# Patient Record
Sex: MALE | Race: WHITE | NOT HISPANIC OR LATINO | Employment: OTHER | URBAN - METROPOLITAN AREA
[De-identification: names, ages, dates, MRNs, and addresses within clinical notes are randomized per-mention and may not be internally consistent; named-entity substitution may affect disease eponyms.]

---

## 2017-02-13 DIAGNOSIS — Z83.71 FAMILY HISTORY OF POLYPS IN THE COLON: ICD-10-CM

## 2017-02-13 DIAGNOSIS — K63.5 COLON POLYPS: Primary | ICD-10-CM

## 2017-02-13 RX ORDER — SODIUM CHLORIDE 0.9 % (FLUSH) 0.9 %
1-10 SYRINGE (ML) INJECTION AS NEEDED
Status: CANCELLED | OUTPATIENT
Start: 2017-02-13

## 2017-02-20 ENCOUNTER — TELEPHONE (OUTPATIENT)
Dept: GASTROENTEROLOGY | Facility: CLINIC | Age: 67
End: 2017-02-20

## 2017-02-20 ENCOUNTER — TRANSCRIBE ORDERS (OUTPATIENT)
Dept: GASTROENTEROLOGY | Facility: CLINIC | Age: 67
End: 2017-02-20

## 2017-02-20 DIAGNOSIS — K22.70 BARRETT'S ESOPHAGUS WITHOUT DYSPLASIA: Primary | ICD-10-CM

## 2017-02-20 DIAGNOSIS — Z83.71 FAMILY HISTORY OF POLYPS IN THE COLON: ICD-10-CM

## 2017-02-20 DIAGNOSIS — Z86.010 HX OF COLONIC POLYPS: ICD-10-CM

## 2017-02-20 RX ORDER — SODIUM CHLORIDE 0.9 % (FLUSH) 0.9 %
1-10 SYRINGE (ML) INJECTION AS NEEDED
Status: CANCELLED | OUTPATIENT
Start: 2017-02-20

## 2017-02-20 RX ORDER — SODIUM CHLORIDE, SODIUM LACTATE, POTASSIUM CHLORIDE, CALCIUM CHLORIDE 600; 310; 30; 20 MG/100ML; MG/100ML; MG/100ML; MG/100ML
30 INJECTION, SOLUTION INTRAVENOUS CONTINUOUS
Status: CANCELLED | OUTPATIENT
Start: 2017-02-20

## 2017-02-20 NOTE — TELEPHONE ENCOUNTER
----- Message from Dago KEMP MD sent at 2/10/2017  4:54 PM EST -----  Regarding: RE: OA Packet  Okay for open access colonoscopy (off aspirin for 5 days) due to suboptimal bowel prep 3 years ago and personal history as well as family history of polyps.  Will need to adjust prep for more effective clean out i.e. clear liquids for 1-2 days prior to preparation, large volume prep.  ----- Message -----     From: Jonathan Hernandez     Sent: 2/10/2017  11:56 AM       To: Dago KEMP MD  Subject: OA Packet                                        Scanned to media

## 2017-02-20 NOTE — TELEPHONE ENCOUNTER
Left voice message informing patient that he has been scheduled for both an EGD(due to his HX of Penaloza's Esophagus) and a Colonoscopy(due to his HX polyps & FH polyps).  Instructed pt that Dr Abbott recommends that he start a clear liquid diet a day prior to his prep and that he might consider taking a laxative daily for 3 days prior to starting his prep on 3/6/17(secondary to his poor prep with his last colonoscopy) to make sure he is completely cleaned out.

## 2017-03-07 ENCOUNTER — ANESTHESIA EVENT (OUTPATIENT)
Dept: GASTROENTEROLOGY | Facility: HOSPITAL | Age: 67
End: 2017-03-07

## 2017-03-07 ENCOUNTER — HOSPITAL ENCOUNTER (OUTPATIENT)
Facility: HOSPITAL | Age: 67
Setting detail: HOSPITAL OUTPATIENT SURGERY
Discharge: HOME | End: 2017-03-07
Attending: INTERNAL MEDICINE | Admitting: INTERNAL MEDICINE

## 2017-03-07 ENCOUNTER — ANESTHESIA (OUTPATIENT)
Dept: GASTROENTEROLOGY | Facility: HOSPITAL | Age: 67
End: 2017-03-07

## 2017-03-07 VITALS
TEMPERATURE: 98.3 F | WEIGHT: 253.25 LBS | HEART RATE: 62 BPM | SYSTOLIC BLOOD PRESSURE: 150 MMHG | DIASTOLIC BLOOD PRESSURE: 93 MMHG | BODY MASS INDEX: 35.45 KG/M2 | HEIGHT: 71 IN | OXYGEN SATURATION: 96 % | RESPIRATION RATE: 16 BRPM

## 2017-03-07 DIAGNOSIS — K22.70 BARRETT'S ESOPHAGUS WITHOUT DYSPLASIA: ICD-10-CM

## 2017-03-07 DIAGNOSIS — Z83.71 FAMILY HISTORY OF POLYPS IN THE COLON: ICD-10-CM

## 2017-03-07 DIAGNOSIS — K63.5 COLON POLYPS: ICD-10-CM

## 2017-03-07 PROCEDURE — 88305 TISSUE EXAM BY PATHOLOGIST: CPT | Performed by: INTERNAL MEDICINE

## 2017-03-07 PROCEDURE — 25010000002 PROPOFOL 10 MG/ML EMULSION: Performed by: ANESTHESIOLOGY

## 2017-03-07 PROCEDURE — 45380 COLONOSCOPY AND BIOPSY: CPT | Performed by: INTERNAL MEDICINE

## 2017-03-07 PROCEDURE — 88312 SPECIAL STAINS GROUP 1: CPT | Performed by: INTERNAL MEDICINE

## 2017-03-07 PROCEDURE — 87081 CULTURE SCREEN ONLY: CPT | Performed by: INTERNAL MEDICINE

## 2017-03-07 PROCEDURE — 43239 EGD BIOPSY SINGLE/MULTIPLE: CPT | Performed by: INTERNAL MEDICINE

## 2017-03-07 RX ORDER — SODIUM CHLORIDE 0.9 % (FLUSH) 0.9 %
1-10 SYRINGE (ML) INJECTION AS NEEDED
Status: DISCONTINUED | OUTPATIENT
Start: 2017-03-07 | End: 2017-03-07 | Stop reason: HOSPADM

## 2017-03-07 RX ORDER — ROSUVASTATIN CALCIUM 40 MG/1
40 TABLET, COATED ORAL DAILY
COMMUNITY

## 2017-03-07 RX ORDER — SODIUM CHLORIDE, SODIUM LACTATE, POTASSIUM CHLORIDE, CALCIUM CHLORIDE 600; 310; 30; 20 MG/100ML; MG/100ML; MG/100ML; MG/100ML
30 INJECTION, SOLUTION INTRAVENOUS CONTINUOUS
Status: DISCONTINUED | OUTPATIENT
Start: 2017-03-07 | End: 2017-03-07 | Stop reason: HOSPADM

## 2017-03-07 RX ORDER — VITAMIN E 268 MG
400 CAPSULE ORAL DAILY
COMMUNITY

## 2017-03-07 RX ORDER — PROPOFOL 10 MG/ML
VIAL (ML) INTRAVENOUS AS NEEDED
Status: DISCONTINUED | OUTPATIENT
Start: 2017-03-07 | End: 2017-03-07 | Stop reason: SURG

## 2017-03-07 RX ORDER — ASPIRIN 81 MG/1
81 TABLET, CHEWABLE ORAL DAILY
COMMUNITY

## 2017-03-07 RX ORDER — LANSOPRAZOLE 30 MG/1
30 CAPSULE, DELAYED RELEASE ORAL DAILY
COMMUNITY

## 2017-03-07 RX ORDER — MOEXIPRIL HCL 15 MG
15 TABLET ORAL NIGHTLY
COMMUNITY

## 2017-03-07 RX ORDER — MELATONIN
1000 DAILY
COMMUNITY

## 2017-03-07 RX ORDER — LIDOCAINE HYDROCHLORIDE 20 MG/ML
INJECTION, SOLUTION INFILTRATION; PERINEURAL AS NEEDED
Status: DISCONTINUED | OUTPATIENT
Start: 2017-03-07 | End: 2017-03-07 | Stop reason: SURG

## 2017-03-07 RX ADMIN — SODIUM CHLORIDE, POTASSIUM CHLORIDE, SODIUM LACTATE AND CALCIUM CHLORIDE 30 ML/HR: 600; 310; 30; 20 INJECTION, SOLUTION INTRAVENOUS at 12:35

## 2017-03-07 RX ADMIN — SODIUM CHLORIDE, POTASSIUM CHLORIDE, SODIUM LACTATE AND CALCIUM CHLORIDE: 600; 310; 30; 20 INJECTION, SOLUTION INTRAVENOUS at 13:00

## 2017-03-07 RX ADMIN — LIDOCAINE HYDROCHLORIDE 60 MG: 20 INJECTION, SOLUTION INFILTRATION; PERINEURAL at 13:00

## 2017-03-07 RX ADMIN — PROPOFOL 150 MG: 10 INJECTION, EMULSION INTRAVENOUS at 13:35

## 2017-03-07 RX ADMIN — PROPOFOL 300 MG: 10 INJECTION, EMULSION INTRAVENOUS at 13:00

## 2017-03-07 NOTE — ANESTHESIA POSTPROCEDURE EVALUATION
Patient: Evan Yoon    Procedure Summary     Date Anesthesia Start Anesthesia Stop Room / Location    03/07/17 1300 1347  [facility] /  [facility]       Procedure Diagnosis Surgeon Provider    COLONOSCOPY TO CECUM AND TERMINAL ILEUM WITH COLD BIOPSY POLYPECTOMIES (N/A ); ESOPHAGOGASTRODUODENOSCOPY WITH BIOPSIES (N/A Esophagus) Family history of polyps in the colon; Colon polyps  (Family history of polyps in the colon [Z83.71]; Colon polyps [K63.5]) MD Matt Haywood MD          Anesthesia Type: MAC  Last vitals  BP      Temp      Pulse     Resp      SpO2        Post Anesthesia Care and Evaluation    Patient location during evaluation: PACU  Patient participation: complete - patient participated  Level of consciousness: awake and alert  Pain management: adequate  Airway patency: patent  Anesthetic complications: No anesthetic complications    Cardiovascular status: acceptable  Respiratory status: acceptable  Hydration status: acceptable

## 2017-03-07 NOTE — DISCHARGE INSTRUCTIONS
WHAT ARE DIVERTICULOSIS AND DIVERTICULITIS?  Many people have small pouches in their colons that bulge outward through weak spots, like an inner tube that pokes through weak places in a tire.  Each pouch is called a diverticulum.  The condition of having diverticula is DIVERTICULOSIS.  The condition becomes more common as people age.  About half of all people over the age of 60 have diverticulosis    When pouches become infected or inflamed, the condition is called DIVERTICULITIS.  This happens in 10% to 25% of people with diverticulosis.  Diverticulosis and diverticulitis are also called DIVERTICULAR DISEASE.     WHAT ARE THE SYMPTOMS?  Diverticulosis - Most people do not have any discomfort or symptoms.  However, symptoms may include mild cramps, bloating, and constipation.  Other diseases such as irritable bowel syndrome (IBS) and stomach ulcers cause similar problems, so these symptoms do not always mean a person has diverticulosis.  You should visit your doctor if you have these troubling symptoms.    Diverticulitis - The most common symptom is abdominal pain.  The most common sign is tenderness around the left side of the lower abdomen.  If infection is the cause, fever, nausea, vomiting, chills, cramping, and constipation may occur as well.  The severity depends on the extent of the infection and complications.    WHAT ARE THE COMPLICATIONS?  Diverticulitis can lead to bleeding, infections,perforations or tears, or blockages.  These complications always require treatment to prevent them from proggressing and causing serous illness.    Bleeding from a diverticula is a rare complication.  When this occurs, blood may appear in the toilet or in your stool.  Bleeding can be severe, but it may stop by itself and not require treatment.  Doctors believe bleeding diverticula are caused by a small blood vessel in a diverticulum that weakens and finally bursts.  If you have bleeding from the rectum, you should see your  doctor.  If the bleeding does not stop you may need surgery.    Abscess, Perforation, and Peritonitis - The infection causing diverticulitis often clears up after a few days of treatment with antibiotics.  If the condition gets worse, an abscess may form in the colon.  An abscess is an infected area with pus that may cause swelling and destroy tissue.  Sometimes the infected diverticula may develop small holes, called perforations.  These perforations allow pus to leak out of the colon into the abdominal area.  If the abscess is small and remains in the colon, it may clear up after treatment with antibiotics.  If not, the doctor may need to drain it.  A large abscess can become a serious problem if the infection leaks out and contaminates areas outside the colon.  Infection that spreads into the abdominal cavity is called peritonitis.  Peritonitis requires immediate surgery toclean the abdominal cavity and remove the damaged part of the colon.  Without surgery, peritonitis can be fatal.    FISTULA  A fistula is an abnormal connection of tissue between two organs or between an organ and the skin.  When damaged tissues come into contact with each other during infection, they sometimes stick together.  If they heal that way, a fistula forms.  When diverticulitis-related infection spreads through out the colon, the colon's tissue may stick to nearby tissues.  The organs usually involved are the bladder, small intestine, and skin.  The problem can be corrected with surgery to remove the fistula and affected part of the colon.    INTESTINAL OBSTRUCTION  The scarring caused by infection may cause partial or total blockage of the large intestine.  When this happens, the colon is unable to move bowel contents normally.  When the obstruction totally blocks the intestine, emergency surgery is necessary.  Partial blockage is not an emergency, so the surgery to correct it can be planned.    WHAT CAUSES DIVERTICULAR  DISEASE  Although not proven, the dominant theory is that a low-fiber diet is the main cause of diverticular disease.  The disease was first noticed in the [place] in the early 1900s.  At about the same time, processed foods were introduced into the American diet.  Many processed foods contain refined, low-fiber flour.  Unlike whole-wheat flour, refined flour has no wheat bran.    Diverticular disease is common in developed or industrialized countries-particularly the [place], [place], and [place]-where low-fiber diets are common.  The disease is rare in countries of [place] and [place], where people eat high-fiber vegetable diets.    Fiber is the part of fruits, vegetables, and whole grains that the body cannot digest.  Some fiber dissolves easily in water (soluble fiber).  It takes on a soft, jelly-like texture in the intestines.  Some fiber passes almost unchanged through the intestines (insoluble fiber).  Both kinds of fiber help make stools soft and easy to pass.  Fiber also prevents constipation.    Constipation makes the muscles strain to move stool that is too hard.  It is the main cause of increased pressure in the colon.  This excess pressure might cause the weak spots in the colon to bulge out and become diverticula.  Diverticulitis occurs when diverticula become infected or inflamed.  Doctors are not certain what causes the infection.  It may begin when stool or bacteria are caught in the diverticula.  An attack of diverticulitis can develop suddenly and without warning.    HOW DOES THE DOCTOR DIAGNOSE DIVERTICULAR DISEASE  The doctor asks about medical history, does a physical exam, and may perform one or more diagnostic tests.  Because most people do not have symptoms, diverticulosis is often found through tests ordered for another ailment.    When taking a medical history, the doctor may ask about bowel habits, symptoms, pain, diet, and medications.  The physical exam usually involves a  digital rectal exam.  To preform this test. The doctor inserts a gloved, lubricated finger into the rectum to detect tenderness, blockage, or blood.  The doctor may check stool for signs of bleeding and test blood for signs of infection.  The doctor may also order x-rays or other tests.    WHAT IS THE TREATMENT FOR DIVERTICULAR DISEASE  Increasing the amount of fiber in the diet may reduce symptoms of diverticulosis and prevent complications such as diverticulitis.  Fiber keeps stool soft and lowers pressure inside the colon so that bowel contents can move through easily.  The American Dietetic Association. Recommends 20 to 35 grams of fiber each day.  The doctor may also recommend taking a fiber product such as Citrucel or Metamucil once a dya.  These products are mixed water and provide about 2 to 3.5 grams of fiber per  Tablespoon, mixed with 8 ounces of water.    Avoidance of nuts, popcorn, and sunflower, pumpkin, mitch, and sesame seeds has been recommended by physicians out of fear that food particles could enter, block, or irritate the diverticula.  However, no scientific data support this treatment measure.  Eating a high-fiber diet is the only requirement highly emphasized across the medical literature.  Eliminating specific foods is not necessary.  The seeds in tomatoes, zucchini, cucumbers, strawberries, and raspberries, as well as poppy seeds, are generally considered harmless.  People differ in amounts and types of foods the can eat.  Decisions about diet should be made based on what works best for each person.  Keeping a food diary may help identify what foods may cause symptoms.    If cramps, bloating, and constipation are problems, the doctor may prescribe  Short course of pain medication.  However, many medications affect emptying of the colon, an undesirable side effect for people with diverticulosis.    DIVERTICULITIS  Treatment focuses on clearing up the infection and inflammation, resting the  colon, and preventing or minimizing complications.  An attack of diverticulitis without complications may respond to antibiotics within a few days if treated early.  To help the colon rest, the doctor may recommend bed rest and a liquid diet, along with a pain reliever.    An acute attack with severe pain or sever infection may require a hospital stay.  Most acute cases of diverticulitis are treated with antibiotics and a liquid diet.  The antibiotics are given by injection into a vein.  In some cases, however, surgery may be necessary.    WHEN IS SURGERY NECESSARY  If attacks are severe or frequent, the doctor may advise surgery.  The surgeon removes the affected part of the colon and joins the remaining sections.  This typed of surgery, called colon resection, aims to keep attacks from coming back and to prevent complications.  The doctor may also recommend surgery for complications of a fistula or intestinal obstruction.    If antibiotics do not correct an attack, emergency surgery may be required.  Other reasons for emergency surgery include a large abscess, perforation, peritonitis, or continued bleeding.    Emergency surgery usually involves 2 operations.  The first will clear the infected abdominal cavity and remove part of the colon.  Because infection and sometimes obstruction, it is not safe to rejoin the colon during the first operation.  Instead, the surgeon creates a temporary hole, or stoma, in the abdomen.  The end of the colon is connected to the hole, a procedure called a colostomy, to allow normal eating and bowel movements.  The stool goes into a bag attached to the opening in the abdomen.  In the second operation, the surgeon rejoins the ends of the colon.

## 2017-03-07 NOTE — H&P
[facility]  Pre Procedure History & Physical    Chief Complaint:   GERD, Penaloza's esophagus, personal history of polyps, family history of polyps    Subjective     HPI:   This 67-year-old white male presents to the endoscopy suite for upper endoscopy and colonoscopy.  He has a history of Penaloza's esophagus last studied in February 2015.  He also Has a personal history of polyps and a family history of polyps.  Last colonoscopy of record was in 2014 with a suboptimal prep.    Past Medical History:   Past Medical History   Diagnosis Date   • Penaloza esophagus    • GERD (gastroesophageal reflux disease)    • Hypertension        Family History:  History reviewed. No pertinent family history.    Social History:   reports that he has never smoked. He does not have any smokeless tobacco history on file.    Medications:   Prescriptions Prior to Admission   Medication Sig Dispense Refill Last Dose   • aspirin 81 MG chewable tablet Chew 81 mg Daily.   3/5/2017   • B Complex Vitamins (VITAMIN B COMPLEX PO) Take  by mouth.   3/4/2017   • cholecalciferol (VITAMIN D3) 1000 UNITS tablet Take 1,000 Units by mouth Daily.   3/4/2017   • lansoprazole (PREVACID) 30 MG capsule Take 30 mg by mouth Daily.   3/5/2017   • moexipril (UNIVASC) 15 MG tablet Take 15 mg by mouth Every Night.   3/7/2017   • Multiple Vitamin (MULTI VITAMIN DAILY PO) Take  by mouth.   3/4/2017   • Omega-3 Fatty Acids (FISH OIL) 1200 MG capsule delayed-release Take  by mouth.   3/4/2017   • rosuvastatin (CRESTOR) 40 MG tablet Take 40 mg by mouth Daily.   3/5/2017   • vitamin E 400 UNIT capsule Take 400 Units by mouth Daily.   3/4/2017       Allergies:  Review of patient's allergies indicates no known allergies.    ROS:    Pertinent items are noted in HPI, all other systems reviewed and negative     Objective     Blood pressure 142/79, pulse 67, temperature 98.3 °F (36.8 °C), temperature source Oral, resp. rate 18, height 71\" (180.3 cm), " weight 253 lb 4 oz (115 kg), SpO2 94 %.    Physical Exam   Constitutional: Pt is oriented to person, place, and time and well-developed, well-nourished, and in no distress.   HENT:   Mouth/Throat: Oropharynx is clear and moist.   Neck: Normal range of motion. Neck supple.   Cardiovascular: Normal rate, regular rhythm and normal heart sounds.    Pulmonary/Chest: Effort normal and breath sounds normal. No respiratory distress. No  wheezes.   Abdominal: Soft. Bowel sounds are normal.   Skin: Skin is warm and dry.   Psychiatric: Mood, memory, affect and judgment normal.     Assessment/Plan     Diagnosis:  GERD  Penaloza's esophagus  Family history of polyps  Personal history of polyps    Anticipated Surgical Procedure:  EGD, colonoscopy    The risks, benefits, and alternatives of this procedure have been discussed with the patient or the responsible party- the patient understands and agrees to proceed.

## 2017-03-07 NOTE — PLAN OF CARE
Problem: GI Endoscopy (Adult)  Intervention: Monitor/Manage Procedure Recovery    03/07/17 1206   Respiratory Interventions   Airway/Ventilation Management airway patency maintained   Activity   Activity Type activity adjusted per tolerance   Cardiac Interventions   Warming Thermoregulation Maintenance warm blankets applied   Coping/Psychosocial Interventions   Environmental Support calm environment promoted         Goal: Signs and Symptoms of Listed Potential Problems Will be Absent or Manageable (GI Endoscopy)  Outcome: Ongoing (interventions implemented as appropriate)    03/07/17 1206   GI Endoscopy   Problems Assessed (GI Endoscopy) pain   Problems Present (GI Endoscopy) none

## 2017-03-07 NOTE — ANESTHESIA PREPROCEDURE EVALUATION
Anesthesia Evaluation        Airway   Mallampati: II  Dental      Pulmonary    Cardiovascular         Neuro/Psych  GI/Hepatic/Renal/Endo      Musculoskeletal     Abdominal    Substance History      OB/GYN          Other                                    Anesthesia Plan    ASA 3     MAC     intravenous induction   Anesthetic plan and risks discussed with patient.

## 2017-03-08 LAB
CYTO UR: NORMAL
LAB AP CASE REPORT: NORMAL
Lab: NORMAL
PATH REPORT.FINAL DX SPEC: NORMAL
PATH REPORT.GROSS SPEC: NORMAL
UREASE TISS QL: NEGATIVE

## 2017-03-10 ENCOUNTER — TELEPHONE (OUTPATIENT)
Dept: GASTROENTEROLOGY | Facility: CLINIC | Age: 67
End: 2017-03-10

## 2017-03-10 NOTE — TELEPHONE ENCOUNTER
Pt called and advised per Dr Abbott that the dudenum bx was normal. The stomach bx showed min chronic inflammation.  The esophageal bx were consistent with knight's esophagus.  The colon polyps that were removed were precancerous and he recommends to continue ppi , repeat egd in 2 yrs, repeat c/s in 3 yrs, f/u yearly and call sooner if needed.  Pt verb understanding.      Message sent to Dary to place f/u in recall for 03/08/2018, egd in recall for 03/08/2019, and c/s in recall for 03/08/2020.

## 2017-03-10 NOTE — TELEPHONE ENCOUNTER
----- Message from Dago KEMP MD sent at 3/9/2017  5:36 PM EST -----  Regarding: Biopsy results  Okay to call results, recommend continued PPI, follow up EGD in 2 years, follow-up colonoscopy in 3 years, office follow-up annually or sooner as needed  ----- Message -----     From: Lab, Background User     Sent: 3/8/2017   1:26 PM       To: Dago KEMP MD

## 2019-03-19 ENCOUNTER — PREP FOR SURGERY (OUTPATIENT)
Dept: OTHER | Facility: HOSPITAL | Age: 69
End: 2019-03-19

## 2019-03-19 DIAGNOSIS — K22.70 BARRETT'S ESOPHAGUS WITHOUT DYSPLASIA: Primary | ICD-10-CM

## 2019-03-20 ENCOUNTER — PREP FOR SURGERY (OUTPATIENT)
Dept: OTHER | Facility: HOSPITAL | Age: 69
End: 2019-03-20

## 2019-03-20 DIAGNOSIS — K22.70 BARRETT'S ESOPHAGUS WITHOUT DYSPLASIA: Primary | ICD-10-CM

## 2019-04-04 PROBLEM — K22.70 BARRETT'S ESOPHAGUS WITHOUT DYSPLASIA: Status: ACTIVE | Noted: 2019-04-04

## 2019-04-23 ENCOUNTER — ANESTHESIA (OUTPATIENT)
Dept: GASTROENTEROLOGY | Facility: HOSPITAL | Age: 69
End: 2019-04-23

## 2019-04-23 ENCOUNTER — HOSPITAL ENCOUNTER (OUTPATIENT)
Facility: HOSPITAL | Age: 69
Setting detail: HOSPITAL OUTPATIENT SURGERY
Discharge: HOME | End: 2019-04-23
Attending: INTERNAL MEDICINE | Admitting: INTERNAL MEDICINE

## 2019-04-23 ENCOUNTER — ANESTHESIA EVENT (OUTPATIENT)
Dept: GASTROENTEROLOGY | Facility: HOSPITAL | Age: 69
End: 2019-04-23

## 2019-04-23 VITALS
HEIGHT: 71 IN | SYSTOLIC BLOOD PRESSURE: 135 MMHG | OXYGEN SATURATION: 95 % | BODY MASS INDEX: 35.99 KG/M2 | HEART RATE: 62 BPM | DIASTOLIC BLOOD PRESSURE: 89 MMHG | TEMPERATURE: 98.1 F | RESPIRATION RATE: 18 BRPM | WEIGHT: 257.06 LBS

## 2019-04-23 DIAGNOSIS — K22.70 BARRETT'S ESOPHAGUS WITHOUT DYSPLASIA: ICD-10-CM

## 2019-04-23 PROCEDURE — 43239 EGD BIOPSY SINGLE/MULTIPLE: CPT | Performed by: INTERNAL MEDICINE

## 2019-04-23 PROCEDURE — 25010000002 PROPOFOL 10 MG/ML EMULSION: Performed by: ANESTHESIOLOGY

## 2019-04-23 PROCEDURE — 25010000002 PROPOFOL 1000 MG/ML EMULSION: Performed by: ANESTHESIOLOGY

## 2019-04-23 PROCEDURE — 87081 CULTURE SCREEN ONLY: CPT | Performed by: INTERNAL MEDICINE

## 2019-04-23 PROCEDURE — S0260 H&P FOR SURGERY: HCPCS | Performed by: INTERNAL MEDICINE

## 2019-04-23 PROCEDURE — 88305 TISSUE EXAM BY PATHOLOGIST: CPT | Performed by: INTERNAL MEDICINE

## 2019-04-23 RX ORDER — LIDOCAINE HYDROCHLORIDE 20 MG/ML
INJECTION, SOLUTION INFILTRATION; PERINEURAL AS NEEDED
Status: DISCONTINUED | OUTPATIENT
Start: 2019-04-23 | End: 2019-04-23 | Stop reason: SURG

## 2019-04-23 RX ORDER — SODIUM CHLORIDE, SODIUM LACTATE, POTASSIUM CHLORIDE, CALCIUM CHLORIDE 600; 310; 30; 20 MG/100ML; MG/100ML; MG/100ML; MG/100ML
30 INJECTION, SOLUTION INTRAVENOUS CONTINUOUS PRN
Status: DISCONTINUED | OUTPATIENT
Start: 2019-04-23 | End: 2019-04-23 | Stop reason: HOSPADM

## 2019-04-23 RX ORDER — PROPOFOL 10 MG/ML
VIAL (ML) INTRAVENOUS AS NEEDED
Status: DISCONTINUED | OUTPATIENT
Start: 2019-04-23 | End: 2019-04-23 | Stop reason: SURG

## 2019-04-23 RX ADMIN — SODIUM CHLORIDE, POTASSIUM CHLORIDE, SODIUM LACTATE AND CALCIUM CHLORIDE 30 ML/HR: 600; 310; 30; 20 INJECTION, SOLUTION INTRAVENOUS at 14:20

## 2019-04-23 RX ADMIN — PROPOFOL 200 MCG/KG/MIN: 10 INJECTION, EMULSION INTRAVENOUS at 15:05

## 2019-04-23 RX ADMIN — LIDOCAINE HYDROCHLORIDE 60 MG: 20 INJECTION, SOLUTION INFILTRATION; PERINEURAL at 15:05

## 2019-04-23 RX ADMIN — PROPOFOL 150 MG: 10 INJECTION, EMULSION INTRAVENOUS at 15:05

## 2019-04-23 NOTE — ANESTHESIA POSTPROCEDURE EVALUATION
"Patient: Evan Yoon    Procedure Summary     Date:  04/23/19 Room / Location:   [facility]    Anesthesia Start:  1457 Anesthesia Stop:  1516    Procedure:  ESOPHAGOGASTRODUODENOSCOPY with biopsies (N/A Esophagus) Diagnosis:       Penaloza's esophagus      Hiatal hernia      Gastritis      (Penaloza's esophagus without dysplasia [K22.70])    Surgeon:  Dago Abbott MD Provider:  Mar Capm MD    Anesthesia Type:  MAC ASA Status:  2          Anesthesia Type: MAC  Last vitals  BP   126/84 (04/23/19 1526)   Temp   36.7 °C (98.1 °F) (04/23/19 1354)   Pulse   67 (04/23/19 1526)   Resp   18 (04/23/19 1526)     SpO2   94 % (04/23/19 1526)     Post Anesthesia Care and Evaluation    Patient location during evaluation: bedside  Patient participation: complete - patient participated  Level of consciousness: awake and alert  Pain management: adequate  Airway patency: patent  Anesthetic complications: No anesthetic complications  PONV Status: none  Cardiovascular status: acceptable  Respiratory status: acceptable  Hydration status: acceptable    Comments: /84 (BP Location: Left arm, Patient Position: Lying)   Pulse 67   Temp 36.7 °C (98.1 °F) (Oral)   Resp 18   Ht 180.3 cm (71\")   Wt 117 kg (257 lb 1 oz)   SpO2 94%   BMI 35.85 kg/m²         "

## 2019-04-23 NOTE — DISCHARGE INSTRUCTIONS
For the next 24 hours patient needs to be with a responsible adult.    For 24 hours DO NOT drive, operate machinery, appliances, drink alcohol, make important decisions or sign legal documents.    Start with a light or bland diet if you are feeling sick to your stomach otherwise advance to regular diet as tolerated.    Follow recommendations on procedure report if provided by your doctor.    Call  for problems [phone]    Problems may include but not limited to: large amounts of bleeding, trouble breathing, repeated vomiting, severe unrelieved pain, fever or chills.

## 2019-04-23 NOTE — ANESTHESIA PREPROCEDURE EVALUATION
Anesthesia Evaluation     Patient summary reviewed   NPO Solid Status: > 8 hours  NPO Liquid Status: > 8 hours           Airway   Mallampati: I  TM distance: >3 FB  Dental      Pulmonary    (+) sleep apnea on CPAP,   Cardiovascular     Rhythm: regular  Rate: normal    (+) hypertension, hyperlipidemia,       Neuro/Psych  GI/Hepatic/Renal/Endo    (+) obesity,  GERD,      Musculoskeletal     Abdominal    Substance History      OB/GYN          Other                        Anesthesia Plan    ASA 2     MAC   total IV anesthesia  Anesthetic plan, all risks, benefits, and alternatives have been provided, discussed and informed consent has been obtained with: patient.

## 2019-04-23 NOTE — H&P
[facility]  Pre Procedure History & Physical    Chief Complaint:   Penaloza's esophagus    Subjective     HPI:   This 69-year-old male presents to the endoscopy suite for upper endoscopic evaluation.  He has a history of Penaloza's esophagus.  Last upper endoscopy performed in March 2017.    Past Medical History:   Past Medical History:   Diagnosis Date   • Penaloza esophagus    • GERD (gastroesophageal reflux disease)    • Hypertension        Past Surgical History:  Past Surgical History:   Procedure Laterality Date   • BACK SURGERY     • CAROTID ENDARTERECTOMY Right    • COLONOSCOPY N/A 3/7/2017    Procedure: COLONOSCOPY TO CECUM AND TERMINAL ILEUM WITH COLD BIOPSY POLYPECTOMIES;  Surgeon: Dago KEMP MD;  Location: [facility] ENDOSCOPY;  Service:    • ENDOSCOPY N/A 3/7/2017    Procedure: ESOPHAGOGASTRODUODENOSCOPY WITH BIOPSIES;  Surgeon: Dago KEMP MD;  Location: [facility] ENDOSCOPY;  Service:        Family History:  No family history on file.    Social History:   reports that he has never smoked. He does not have any smokeless tobacco history on file.    Medications:   No medications prior to admission.       Allergies:  Patient has no known allergies.    ROS:    Pertinent items are noted in HPI, all other systems reviewed and negative     Objective     There were no vitals taken for this visit.    Physical Exam   Constitutional: Pt is oriented to person, place, and time and well-developed, well-nourished, and in no distress.   Mouth/Throat: Oropharynx is clear and moist.   Neck: Normal range of motion.   Cardiovascular: Normal rate, regular rhythm and normal heart sounds.    Pulmonary/Chest: Effort normal and breath sounds normal.   Abdominal: Soft. Nontender  Skin: Skin is warm and dry.   Psychiatric: Mood, memory, affect and judgment normal.     Assessment/Plan     Diagnosis:  Penaloza's esophagus    Anticipated Surgical Procedure:  EGD    The risks, benefits, and  alternatives of this procedure have been discussed with the patient or the responsible party- the patient understands and agrees to proceed.

## 2019-04-24 LAB — UREASE TISS QL: NEGATIVE

## 2019-04-25 LAB
CYTO UR: NORMAL
LAB AP CASE REPORT: NORMAL
PATH REPORT.FINAL DX SPEC: NORMAL
PATH REPORT.GROSS SPEC: NORMAL

## 2019-05-01 ENCOUNTER — TELEPHONE (OUTPATIENT)
Dept: GASTROENTEROLOGY | Facility: CLINIC | Age: 69
End: 2019-05-01

## 2019-05-01 NOTE — TELEPHONE ENCOUNTER
Called pt and advised per Dr Abbott that the duodenal bx was benign.  The stomach bx showed mild chronic inflammation was neg for h pylori.   The ge junction bx showed mild chronic inflammation and knight's esophagus without dysplasia.      He recommends to continue ppi, repeat egd in 2 yrs , f/u yearly and sooner if needed.     Pt verb understanding.     Egd placed in recall for 04/23/2021 and f/u placed in recall for 04/23/2020.

## 2019-05-01 NOTE — TELEPHONE ENCOUNTER
----- Message from Dago KEMP MD sent at 4/30/2019  4:40 PM EDT -----  Regarding: Biopsy results  Okay to call results, continue PPI.  Follow-up EGD in 2 years.  Office follow-up annually or sooner as needed.  ----- Message -----  From: Lab, Background User  Sent: 4/24/2019   8:00 PM  To: Dago KEMP MD

## 2019-09-04 ENCOUNTER — OUTSIDE FACILITY SERVICE (OUTPATIENT)
Dept: SLEEP MEDICINE | Facility: HOSPITAL | Age: 69
End: 2019-09-04

## 2019-09-04 ENCOUNTER — HOSPITAL ENCOUNTER (OUTPATIENT)
Dept: SLEEP MEDICINE | Facility: HOSPITAL | Age: 69
Discharge: HOME | End: 2019-09-04
Attending: INTERNAL MEDICINE

## 2019-09-04 PROCEDURE — 99203 OFFICE O/P NEW LOW 30 MIN: CPT | Performed by: INTERNAL MEDICINE

## 2019-12-02 ENCOUNTER — HOSPITAL ENCOUNTER (OUTPATIENT)
Dept: CARDIOLOGY | Facility: HOSPITAL | Age: 69
Discharge: HOME | End: 2019-12-02
Attending: FAMILY MEDICINE

## 2021-06-03 ENCOUNTER — TELEPHONE (OUTPATIENT)
Dept: GASTROENTEROLOGY | Facility: CLINIC | Age: 71
End: 2021-06-03

## 2021-06-03 ENCOUNTER — OFFICE VISIT (OUTPATIENT)
Dept: GASTROENTEROLOGY | Facility: CLINIC | Age: 71
End: 2021-06-03

## 2021-06-03 VITALS — BODY MASS INDEX: 34.61 KG/M2 | HEIGHT: 71 IN | TEMPERATURE: 98.4 F | WEIGHT: 247.2 LBS

## 2021-06-03 DIAGNOSIS — D12.6 ADENOMATOUS POLYP OF COLON, UNSPECIFIED PART OF COLON: ICD-10-CM

## 2021-06-03 DIAGNOSIS — K21.9 GASTROESOPHAGEAL REFLUX DISEASE, UNSPECIFIED WHETHER ESOPHAGITIS PRESENT: Primary | ICD-10-CM

## 2021-06-03 DIAGNOSIS — K22.70 BARRETT'S ESOPHAGUS WITHOUT DYSPLASIA: ICD-10-CM

## 2021-06-03 PROCEDURE — 99213 OFFICE O/P EST LOW 20 MIN: CPT | Performed by: INTERNAL MEDICINE

## 2021-06-03 RX ORDER — TADALAFIL 5 MG/1
5 TABLET ORAL DAILY
COMMUNITY
Start: 2021-05-24

## 2021-06-03 RX ORDER — ALBUTEROL SULFATE 90 UG/1
2 AEROSOL, METERED RESPIRATORY (INHALATION) EVERY 4 HOURS PRN
COMMUNITY
Start: 2021-05-24

## 2021-06-03 RX ORDER — HYDROCHLOROTHIAZIDE 25 MG/1
25 TABLET ORAL DAILY
COMMUNITY
Start: 2021-05-24

## 2021-06-03 RX ORDER — AMLODIPINE BESYLATE 10 MG/1
10 TABLET ORAL DAILY
COMMUNITY
Start: 2021-05-24

## 2021-06-03 NOTE — PROGRESS NOTES
Chief Complaint   Patient presents with   • Follow-up   • Penaloza's esophagus        Evan Yoon is a  71 y.o. male here for a follow up visit for GERD, Penaloza's esophagus, history of polyps    HPI this 71-year-old white male patient of Dr. John Fischer presents since last seen in 2019.  His reflux is well controlled with Prevacid.  He has a history of Penaloza's esophagus which was last defined in 2019.  He also has a history of colon polyps and was found to have adenomatous polyps in 2017.  He would be due for upper and lower endoscopic evaluation at this time and he is amenable to this.    Past Medical History:   Diagnosis Date   • Penaloza esophagus    • GERD (gastroesophageal reflux disease)    • Hypertension        Current Outpatient Medications   Medication Sig Dispense Refill   • aspirin 81 MG chewable tablet Chew 81 mg Daily.     • B Complex Vitamins (VITAMIN B COMPLEX PO) Take  by mouth.     • cholecalciferol (VITAMIN D3) 1000 UNITS tablet Take 1,000 Units by mouth Daily.     • hydroCHLOROthiazide (HYDRODIURIL) 25 MG tablet Take 25 mg by mouth Daily.     • lansoprazole (PREVACID) 30 MG capsule Take 30 mg by mouth Daily.     • moexipril (UNIVASC) 15 MG tablet Take 15 mg by mouth Every Night.     • Multiple Vitamin (MULTI VITAMIN DAILY PO) Take  by mouth.     • Omega-3 Fatty Acids (FISH OIL) 1200 MG capsule delayed-release Take  by mouth.     • rosuvastatin (CRESTOR) 40 MG tablet Take 40 mg by mouth Daily.     • tadalafil (CIALIS) 5 MG tablet Take 5 mg by mouth Daily.     • Ventolin  (90 Base) MCG/ACT inhaler Inhale 2 puffs Every 4 (Four) Hours As Needed.     • vitamin E 400 UNIT capsule Take 400 Units by mouth Daily.     • amLODIPine (NORVASC) 10 MG tablet Take 10 mg by mouth Daily.       No current facility-administered medications for this visit.       PRN Meds:.    No Known Allergies    Social History     Socioeconomic History   • Marital status:      Spouse name: Not on file   • Number of  children: Not on file   • Years of education: Not on file   • Highest education level: Not on file   Tobacco Use   • Smoking status: Former Smoker     Quit date:      Years since quittin.4   • Smokeless tobacco: Never Used   Substance and Sexual Activity   • Alcohol use: Yes     Comment: social    • Drug use: Never       Family History   Problem Relation Age of Onset   • Colon cancer Maternal Aunt        Review of Systems   Constitutional: Negative for activity change, appetite change, fatigue and unexpected weight change.   HENT: Negative for congestion, facial swelling, sore throat, trouble swallowing and voice change.    Eyes: Negative for photophobia and visual disturbance.   Respiratory: Negative for cough and choking.    Cardiovascular: Negative for chest pain.   Gastrointestinal: Negative for abdominal distention, abdominal pain, anal bleeding, blood in stool, constipation, diarrhea, nausea, rectal pain and vomiting.        GERD   Endocrine: Negative for polyphagia.   Musculoskeletal: Negative for arthralgias, gait problem and joint swelling.   Skin: Negative for color change, pallor and rash.   Allergic/Immunologic: Negative for food allergies.   Neurological: Negative for speech difficulty and headaches.   Hematological: Does not bruise/bleed easily.   Psychiatric/Behavioral: Negative for agitation, confusion and sleep disturbance.       Vitals:    21 1320   Temp: 98.4 °F (36.9 °C)       Physical Exam  Constitutional:       Appearance: He is well-developed.   HENT:      Head: Normocephalic.   Eyes:      Conjunctiva/sclera: Conjunctivae normal.   Cardiovascular:      Rate and Rhythm: Normal rate and regular rhythm.   Pulmonary:      Breath sounds: Normal breath sounds.   Abdominal:      General: Bowel sounds are normal.      Palpations: Abdomen is soft.   Musculoskeletal:         General: Normal range of motion.      Cervical back: Normal range of motion.   Skin:     General: Skin is warm and  dry.   Neurological:      Mental Status: He is alert and oriented to person, place, and time.   Psychiatric:         Behavior: Behavior normal.         ASSESSMENT   #1 GERD: On Prevacid  #2 Penaloza's esophagus  #3 history of polyps      PLAN  Schedule EGD and colonoscopy  Continue PPI      ICD-10-CM ICD-9-CM   1. Gastroesophageal reflux disease, unspecified whether esophagitis present  K21.9 530.81   2. Penaloza's esophagus without dysplasia  K22.70 530.85   3. Adenomatous polyp of colon, unspecified part of colon  D12.6 211.3

## 2021-06-03 NOTE — TELEPHONE ENCOUNTER
spoke with pt schedule at [facility] on july 22 arrive at 0940 am jeffrey garcia---elan

## 2021-07-14 ENCOUNTER — TRANSCRIBE ORDERS (OUTPATIENT)
Dept: LAB | Facility: HOSPITAL | Age: 71
End: 2021-07-14

## 2021-07-14 DIAGNOSIS — Z01.818 PREOP TESTING: Primary | ICD-10-CM

## 2021-07-19 ENCOUNTER — LAB (OUTPATIENT)
Dept: LAB | Facility: HOSPITAL | Age: 71
End: 2021-07-19

## 2021-07-19 DIAGNOSIS — Z01.818 PREOP TESTING: ICD-10-CM

## 2021-07-19 LAB — SARS-COV-2 RNA RESP QL NAA+PROBE: NOT DETECTED

## 2021-07-19 PROCEDURE — U0003 INFECTIOUS AGENT DETECTION BY NUCLEIC ACID (DNA OR RNA); SEVERE ACUTE RESPIRATORY SYNDROME CORONAVIRUS 2 (SARS-COV-2) (CORONAVIRUS DISEASE [COVID-19]), AMPLIFIED PROBE TECHNIQUE, MAKING USE OF HIGH THROUGHPUT TECHNOLOGIES AS DESCRIBED BY CMS-2020-01-R: HCPCS

## 2021-07-19 PROCEDURE — C9803 HOPD COVID-19 SPEC COLLECT: HCPCS

## 2021-07-19 PROCEDURE — U0005 INFEC AGEN DETEC AMPLI PROBE: HCPCS

## 2021-07-22 ENCOUNTER — HOSPITAL ENCOUNTER (OUTPATIENT)
Facility: HOSPITAL | Age: 71
Setting detail: HOSPITAL OUTPATIENT SURGERY
Discharge: HOME | End: 2021-07-22
Attending: INTERNAL MEDICINE | Admitting: INTERNAL MEDICINE

## 2021-07-22 ENCOUNTER — ANESTHESIA EVENT (OUTPATIENT)
Dept: GASTROENTEROLOGY | Facility: HOSPITAL | Age: 71
End: 2021-07-22

## 2021-07-22 ENCOUNTER — ANESTHESIA (OUTPATIENT)
Dept: GASTROENTEROLOGY | Facility: HOSPITAL | Age: 71
End: 2021-07-22

## 2021-07-22 VITALS
OXYGEN SATURATION: 94 % | RESPIRATION RATE: 16 BRPM | BODY MASS INDEX: 33.88 KG/M2 | DIASTOLIC BLOOD PRESSURE: 71 MMHG | HEART RATE: 80 BPM | SYSTOLIC BLOOD PRESSURE: 133 MMHG | WEIGHT: 242 LBS | TEMPERATURE: 98.4 F | HEIGHT: 71 IN

## 2021-07-22 DIAGNOSIS — K22.70 BARRETT'S ESOPHAGUS WITHOUT DYSPLASIA: ICD-10-CM

## 2021-07-22 DIAGNOSIS — D12.6 ADENOMATOUS POLYP OF COLON, UNSPECIFIED PART OF COLON: ICD-10-CM

## 2021-07-22 DIAGNOSIS — K21.9 GASTROESOPHAGEAL REFLUX DISEASE, UNSPECIFIED WHETHER ESOPHAGITIS PRESENT: ICD-10-CM

## 2021-07-22 PROCEDURE — 25010000002 PROPOFOL 10 MG/ML EMULSION: Performed by: NURSE ANESTHETIST, CERTIFIED REGISTERED

## 2021-07-22 PROCEDURE — 43239 EGD BIOPSY SINGLE/MULTIPLE: CPT | Performed by: INTERNAL MEDICINE

## 2021-07-22 PROCEDURE — 88305 TISSUE EXAM BY PATHOLOGIST: CPT | Performed by: INTERNAL MEDICINE

## 2021-07-22 PROCEDURE — 87081 CULTURE SCREEN ONLY: CPT | Performed by: INTERNAL MEDICINE

## 2021-07-22 PROCEDURE — G0105 COLORECTAL SCRN; HI RISK IND: HCPCS | Performed by: INTERNAL MEDICINE

## 2021-07-22 PROCEDURE — S0260 H&P FOR SURGERY: HCPCS | Performed by: INTERNAL MEDICINE

## 2021-07-22 RX ORDER — SODIUM CHLORIDE 0.9 % (FLUSH) 0.9 %
3 SYRINGE (ML) INJECTION EVERY 12 HOURS SCHEDULED
Status: DISCONTINUED | OUTPATIENT
Start: 2021-07-22 | End: 2021-07-22 | Stop reason: HOSPADM

## 2021-07-22 RX ORDER — GLYCOPYRROLATE 0.2 MG/ML
INJECTION INTRAMUSCULAR; INTRAVENOUS AS NEEDED
Status: DISCONTINUED | OUTPATIENT
Start: 2021-07-22 | End: 2021-07-22 | Stop reason: SURG

## 2021-07-22 RX ORDER — PROPOFOL 10 MG/ML
VIAL (ML) INTRAVENOUS AS NEEDED
Status: DISCONTINUED | OUTPATIENT
Start: 2021-07-22 | End: 2021-07-22 | Stop reason: SURG

## 2021-07-22 RX ORDER — SODIUM CHLORIDE 0.9 % (FLUSH) 0.9 %
10 SYRINGE (ML) INJECTION AS NEEDED
Status: DISCONTINUED | OUTPATIENT
Start: 2021-07-22 | End: 2021-07-22 | Stop reason: HOSPADM

## 2021-07-22 RX ORDER — SODIUM CHLORIDE, SODIUM LACTATE, POTASSIUM CHLORIDE, CALCIUM CHLORIDE 600; 310; 30; 20 MG/100ML; MG/100ML; MG/100ML; MG/100ML
30 INJECTION, SOLUTION INTRAVENOUS CONTINUOUS PRN
Status: DISCONTINUED | OUTPATIENT
Start: 2021-07-22 | End: 2021-07-22 | Stop reason: HOSPADM

## 2021-07-22 RX ORDER — PROPOFOL 10 MG/ML
VIAL (ML) INTRAVENOUS CONTINUOUS PRN
Status: DISCONTINUED | OUTPATIENT
Start: 2021-07-22 | End: 2021-07-22 | Stop reason: SURG

## 2021-07-22 RX ORDER — LIDOCAINE HYDROCHLORIDE 20 MG/ML
INJECTION, SOLUTION INFILTRATION; PERINEURAL AS NEEDED
Status: DISCONTINUED | OUTPATIENT
Start: 2021-07-22 | End: 2021-07-22 | Stop reason: SURG

## 2021-07-22 RX ADMIN — GLYCOPYRROLATE 0.2 MG: 0.2 INJECTION INTRAMUSCULAR; INTRAVENOUS at 10:30

## 2021-07-22 RX ADMIN — PROPOFOL 300 MCG/KG/MIN: 10 INJECTION, EMULSION INTRAVENOUS at 10:35

## 2021-07-22 RX ADMIN — Medication 100 MG: at 10:35

## 2021-07-22 RX ADMIN — Medication 50 MG: at 10:36

## 2021-07-22 RX ADMIN — SODIUM CHLORIDE, POTASSIUM CHLORIDE, SODIUM LACTATE AND CALCIUM CHLORIDE 30 ML/HR: 600; 310; 30; 20 INJECTION, SOLUTION INTRAVENOUS at 10:21

## 2021-07-22 RX ADMIN — LIDOCAINE HYDROCHLORIDE 100 MG: 20 INJECTION, SOLUTION INFILTRATION; PERINEURAL at 10:35

## 2021-07-22 NOTE — DISCHARGE INSTRUCTIONS
For the next 24 hours patient needs to be with a responsible adult.    For 24 hours DO NOT drive, operate machinery, appliances, drink alcohol, make important decisions or sign legal documents.    Start with a light or bland diet if you are feeling sick to your stomach otherwise advance to regular diet as tolerated.    Follow recommendations on procedure report if provided by your doctor.    Call Dr Abbott for problems [phone]    Problems may include but not limited to: large amounts of bleeding, trouble breathing, repeated vomiting, severe unrelieved pain, fever or chills.

## 2021-07-22 NOTE — ANESTHESIA POSTPROCEDURE EVALUATION
Patient: Evan Yoon    Procedure Summary     Date: 07/22/21 Room / Location:  [facility]    Anesthesia Start: 1028 Anesthesia Stop: 1105    Procedures:       COLONOSCOPY TO CECUM/TI (N/A )      ESOPHAGOGASTRODUODENOSCOPY WITH BX'S (N/A Esophagus) Diagnosis:       Esophagitis      Penaloza's esophagus      Hiatal hernia      Gastritis      Diverticulosis      Tortuous colon      Hemorrhoids      (Gastroesophageal reflux disease, unspecified whether esophagitis present [K21.9])      (Penaloza's esophagus without dysplasia [K22.70])      (Adenomatous polyp of colon, unspecified part of colon [D12.6])    Surgeons: Dago Abbott MD Provider: Feli España MD    Anesthesia Type: MAC ASA Status: 2          Anesthesia Type: MAC    Vitals  Vitals Value Taken Time   /71 07/22/21 1124   Temp     Pulse 80 07/22/21 1124   Resp 16 07/22/21 1124   SpO2 94 % 07/22/21 1124           Post Anesthesia Care and Evaluation    Patient location during evaluation: bedside  Patient participation: complete - patient participated  Level of consciousness: awake  Pain management: adequate  Airway patency: patent  Anesthetic complications: No anesthetic complications    Cardiovascular status: acceptable  Respiratory status: acceptable  Hydration status: acceptable

## 2021-07-22 NOTE — ANESTHESIA PREPROCEDURE EVALUATION
Anesthesia Evaluation                  Airway   Mallampati: III  TM distance: >3 FB  Neck ROM: full  No difficulty expected  Dental - normal exam     Pulmonary - normal exam   (+) sleep apnea,   Cardiovascular - normal exam    (+) hypertension,       Neuro/Psych  GI/Hepatic/Renal/Endo    (+)  GERD,      Musculoskeletal     Abdominal    Substance History      OB/GYN          Other                        Anesthesia Plan    ASA 2     MAC     intravenous induction     Anesthetic plan, all risks, benefits, and alternatives have been provided, discussed and informed consent has been obtained with: patient.

## 2021-07-22 NOTE — H&P
[facility]  Pre Procedure History & Physical    Chief Complaint:   GERD, Penaloza's esophagus, history of polyps    Subjective     HPI:   71-year-old male presents the endoscopy suite for upper and lower endoscopic evaluations.  He has issues with reflux and Penaloza's esophagus.  He also has a prior history of colon polyps.  Last colonoscopy performed in 2017 with adenomatous polyps removed.    Past Medical History:   Past Medical History:   Diagnosis Date   • Penaloza esophagus    • GERD (gastroesophageal reflux disease)    • Hypertension        Past Surgical History:  Past Surgical History:   Procedure Laterality Date   • BACK SURGERY     • CAROTID ENDARTERECTOMY Right    • COLONOSCOPY N/A 3/7/2017    Procedure: COLONOSCOPY TO CECUM AND TERMINAL ILEUM WITH COLD BIOPSY POLYPECTOMIES;  Surgeon: Dago KEMP MD;  Location: [facility] ENDOSCOPY;  Service:    • ENDOSCOPY N/A 3/7/2017    Procedure: ESOPHAGOGASTRODUODENOSCOPY WITH BIOPSIES;  Surgeon: Dago KEMP MD;  Location: [facility] ENDOSCOPY;  Service:    • ENDOSCOPY N/A 4/23/2019    Procedure: ESOPHAGOGASTRODUODENOSCOPY with biopsies;  Surgeon: Dago Abbott MD;  Location: [facility] ENDOSCOPY;  Service: Gastroenterology       Family History:  Family History   Problem Relation Age of Onset   • Colon cancer Maternal Aunt        Social History:   reports that he quit smoking about 31 years ago. He has never used smokeless tobacco. He reports current alcohol use. He reports that he does not use drugs.    Medications:   No medications prior to admission.       Allergies:  Patient has no known allergies.    ROS:    Pertinent items are noted in HPI, all other systems reviewed and negative     Objective     There were no vitals taken for this visit.    Physical Exam   Constitutional: Pt is oriented to person, place, and time and well-developed, well-nourished, and in no distress.   Mouth/Throat: Oropharynx is clear and moist.   Neck:  Normal range of motion.   Cardiovascular: Normal rate, regular rhythm and normal heart sounds.    Pulmonary/Chest: Effort normal and breath sounds normal.   Abdominal: Soft. Nontender  Skin: Skin is warm and dry.   Psychiatric: Mood, memory, affect and judgment normal.     Assessment/Plan     Diagnosis:  GERD  Penaloza's esophagus  Polyps    Anticipated Surgical Procedure:  EGD, colonoscopy    The risks, benefits, and alternatives of this procedure have been discussed with the patient or the responsible party- the patient understands and agrees to proceed.

## 2021-07-23 LAB
CYTO UR: NORMAL
LAB AP CASE REPORT: NORMAL
PATH REPORT.FINAL DX SPEC: NORMAL
PATH REPORT.GROSS SPEC: NORMAL
UREASE TISS QL: NEGATIVE

## 2021-08-13 ENCOUNTER — TELEPHONE (OUTPATIENT)
Dept: GASTROENTEROLOGY | Facility: CLINIC | Age: 71
End: 2021-08-13

## 2021-08-13 NOTE — TELEPHONE ENCOUNTER
----- Message from Dago KEMP MD sent at 7/23/2021  5:15 PM EDT -----  Regarding: Biopsy results  Okay to call results, recommend follow-up EGD in 3 years, office follow-up annually.  Continue PPI  ----- Message -----  From: Lab, Background User  Sent: 7/23/2021   9:38 AM EDT  To: Dago KEMP MD

## 2021-08-13 NOTE — TELEPHONE ENCOUNTER
Call to pt.  Advise per path report that duodenal bx benign.  Stomach body with mild chronic inflammation.  GE junction and distal esophagus with Penaloza's.     Advise per Dr Abbott note.  Verb understanding.  On prevacid.     EGD for 7/22/24 placed in recall and HM.  O/v for 7/22/22 placed in recall.

## 2023-09-24 ENCOUNTER — APPOINTMENT (OUTPATIENT)
Dept: CT IMAGING | Facility: HOSPITAL | Age: 73
End: 2023-09-24
Payer: MEDICARE

## 2023-09-24 ENCOUNTER — APPOINTMENT (OUTPATIENT)
Dept: MRI IMAGING | Facility: HOSPITAL | Age: 73
End: 2023-09-24
Payer: MEDICARE

## 2023-09-24 ENCOUNTER — HOSPITAL ENCOUNTER (OUTPATIENT)
Facility: HOSPITAL | Age: 73
Setting detail: OBSERVATION
LOS: 1 days | Discharge: HOME | End: 2023-09-25
Attending: EMERGENCY MEDICINE | Admitting: INTERNAL MEDICINE
Payer: MEDICARE

## 2023-09-24 DIAGNOSIS — G45.9 TIA (TRANSIENT ISCHEMIC ATTACK): Primary | ICD-10-CM

## 2023-09-24 DIAGNOSIS — R13.10 DYSPHAGIA, UNSPECIFIED TYPE: ICD-10-CM

## 2023-09-24 DIAGNOSIS — R26.2 DIFFICULTY IN WALKING: ICD-10-CM

## 2023-09-24 PROBLEM — I77.1 STENOSIS OF RIGHT SUBCLAVIAN ARTERY: Status: ACTIVE | Noted: 2020-11-24

## 2023-09-24 PROBLEM — I65.22 ASYMPTOMATIC STENOSIS OF LEFT CAROTID ARTERY: Status: ACTIVE | Noted: 2023-09-24

## 2023-09-24 PROBLEM — Z98.890 HISTORY OF RIGHT-SIDED CAROTID ENDARTERECTOMY: Status: ACTIVE | Noted: 2019-11-19

## 2023-09-24 PROBLEM — E78.5 HYPERLIPIDEMIA: Status: ACTIVE | Noted: 2023-09-24

## 2023-09-24 LAB
ALBUMIN SERPL-MCNC: 4.4 G/DL (ref 3.5–5.2)
ALBUMIN/GLOB SERPL: 1.5 G/DL
ALP SERPL-CCNC: 53 U/L (ref 39–117)
ALT SERPL W P-5'-P-CCNC: 31 U/L (ref 1–41)
ANION GAP SERPL CALCULATED.3IONS-SCNC: 13.8 MMOL/L (ref 5–15)
APTT PPP: 27.9 SECONDS (ref 78–95.9)
AST SERPL-CCNC: 53 U/L (ref 1–40)
BASOPHILS # BLD AUTO: 0.03 10*3/MM3 (ref 0–0.2)
BASOPHILS NFR BLD AUTO: 0.5 % (ref 0–1.5)
BILIRUB SERPL-MCNC: 0.9 MG/DL (ref 0–1.2)
BUN SERPL-MCNC: 7 MG/DL (ref 8–23)
BUN/CREAT SERPL: 9.6 (ref 7–25)
CALCIUM SPEC-SCNC: 9.2 MG/DL (ref 8.6–10.5)
CHLORIDE SERPL-SCNC: 102 MMOL/L (ref 98–107)
CO2 SERPL-SCNC: 23.2 MMOL/L (ref 22–29)
CREAT SERPL-MCNC: 0.73 MG/DL (ref 0.76–1.27)
DEPRECATED RDW RBC AUTO: 46 FL (ref 37–54)
EGFRCR SERPLBLD CKD-EPI 2021: 95.5 ML/MIN/1.73
EOSINOPHIL # BLD AUTO: 0.17 10*3/MM3 (ref 0–0.4)
EOSINOPHIL NFR BLD AUTO: 3 % (ref 0.3–6.2)
ERYTHROCYTE [DISTWIDTH] IN BLOOD BY AUTOMATED COUNT: 12.8 % (ref 12.3–15.4)
GLOBULIN UR ELPH-MCNC: 2.9 GM/DL
GLUCOSE BLDC GLUCOMTR-MCNC: 106 MG/DL (ref 70–99)
GLUCOSE SERPL-MCNC: 104 MG/DL (ref 65–99)
HCT VFR BLD AUTO: 44.2 % (ref 37.5–51)
HGB BLD-MCNC: 15.4 G/DL (ref 13–17.7)
IMM GRANULOCYTES # BLD AUTO: 0.01 10*3/MM3 (ref 0–0.05)
IMM GRANULOCYTES NFR BLD AUTO: 0.2 % (ref 0–0.5)
INR PPP: 1.07 (ref 0.86–1.15)
LYMPHOCYTES # BLD AUTO: 0.7 10*3/MM3 (ref 0.7–3.1)
LYMPHOCYTES NFR BLD AUTO: 12.4 % (ref 19.6–45.3)
MCH RBC QN AUTO: 34.5 PG (ref 26.6–33)
MCHC RBC AUTO-ENTMCNC: 34.8 G/DL (ref 31.5–35.7)
MCV RBC AUTO: 98.9 FL (ref 79–97)
MONOCYTES # BLD AUTO: 0.78 10*3/MM3 (ref 0.1–0.9)
MONOCYTES NFR BLD AUTO: 13.8 % (ref 5–12)
NEUTROPHILS NFR BLD AUTO: 3.97 10*3/MM3 (ref 1.7–7)
NEUTROPHILS NFR BLD AUTO: 70.1 % (ref 42.7–76)
NRBC BLD AUTO-RTO: 0 /100 WBC (ref 0–0.2)
PLATELET # BLD AUTO: 170 10*3/MM3 (ref 140–450)
PMV BLD AUTO: 9.3 FL (ref 6–12)
POTASSIUM SERPL-SCNC: 4.1 MMOL/L (ref 3.5–5.2)
PROT SERPL-MCNC: 7.3 G/DL (ref 6–8.5)
PROTHROMBIN TIME: 14 SECONDS (ref 11.8–14.9)
RBC # BLD AUTO: 4.47 10*6/MM3 (ref 4.14–5.8)
SODIUM SERPL-SCNC: 139 MMOL/L (ref 136–145)
WBC NRBC COR # BLD: 5.66 10*3/MM3 (ref 3.4–10.8)

## 2023-09-24 PROCEDURE — 70551 MRI BRAIN STEM W/O DYE: CPT

## 2023-09-24 PROCEDURE — 70450 CT HEAD/BRAIN W/O DYE: CPT

## 2023-09-24 PROCEDURE — 70498 CT ANGIOGRAPHY NECK: CPT

## 2023-09-24 PROCEDURE — 99204 OFFICE O/P NEW MOD 45 MIN: CPT | Performed by: PSYCHIATRY & NEUROLOGY

## 2023-09-24 PROCEDURE — 82948 REAGENT STRIP/BLOOD GLUCOSE: CPT

## 2023-09-24 PROCEDURE — 85025 COMPLETE CBC W/AUTO DIFF WBC: CPT | Performed by: EMERGENCY MEDICINE

## 2023-09-24 PROCEDURE — G0378 HOSPITAL OBSERVATION PER HR: HCPCS

## 2023-09-24 PROCEDURE — 25510000001 IOPAMIDOL PER 1 ML: Performed by: EMERGENCY MEDICINE

## 2023-09-24 PROCEDURE — 70496 CT ANGIOGRAPHY HEAD: CPT

## 2023-09-24 PROCEDURE — 93005 ELECTROCARDIOGRAM TRACING: CPT | Performed by: EMERGENCY MEDICINE

## 2023-09-24 PROCEDURE — 80053 COMPREHEN METABOLIC PANEL: CPT | Performed by: EMERGENCY MEDICINE

## 2023-09-24 PROCEDURE — 85730 THROMBOPLASTIN TIME PARTIAL: CPT | Performed by: EMERGENCY MEDICINE

## 2023-09-24 PROCEDURE — 85610 PROTHROMBIN TIME: CPT | Performed by: EMERGENCY MEDICINE

## 2023-09-24 PROCEDURE — 99285 EMERGENCY DEPT VISIT HI MDM: CPT

## 2023-09-24 RX ORDER — AMLODIPINE BESYLATE 5 MG/1
10 TABLET ORAL DAILY
Status: DISCONTINUED | OUTPATIENT
Start: 2023-09-24 | End: 2023-09-25 | Stop reason: HOSPADM

## 2023-09-24 RX ORDER — SODIUM CHLORIDE 0.9 % (FLUSH) 0.9 %
10 SYRINGE (ML) INJECTION AS NEEDED
Status: DISCONTINUED | OUTPATIENT
Start: 2023-09-24 | End: 2023-09-25 | Stop reason: HOSPADM

## 2023-09-24 RX ORDER — SODIUM CHLORIDE 0.9 % (FLUSH) 0.9 %
10 SYRINGE (ML) INJECTION EVERY 12 HOURS SCHEDULED
Status: DISCONTINUED | OUTPATIENT
Start: 2023-09-24 | End: 2023-09-25 | Stop reason: HOSPADM

## 2023-09-24 RX ORDER — ASPIRIN 81 MG/1
162 TABLET, CHEWABLE ORAL ONCE
Status: COMPLETED | OUTPATIENT
Start: 2023-09-24 | End: 2023-09-24

## 2023-09-24 RX ORDER — ALUMINA, MAGNESIA, AND SIMETHICONE 2400; 2400; 240 MG/30ML; MG/30ML; MG/30ML
7.5 SUSPENSION ORAL EVERY 4 HOURS PRN
Status: DISCONTINUED | OUTPATIENT
Start: 2023-09-24 | End: 2023-09-25 | Stop reason: HOSPADM

## 2023-09-24 RX ORDER — ACETAMINOPHEN 325 MG/1
650 TABLET ORAL EVERY 4 HOURS PRN
Status: DISCONTINUED | OUTPATIENT
Start: 2023-09-24 | End: 2023-09-25 | Stop reason: HOSPADM

## 2023-09-24 RX ORDER — CLOPIDOGREL BISULFATE 75 MG/1
300 TABLET ORAL ONCE
Status: COMPLETED | OUTPATIENT
Start: 2023-09-24 | End: 2023-09-24

## 2023-09-24 RX ORDER — ATORVASTATIN CALCIUM 40 MG/1
80 TABLET, FILM COATED ORAL NIGHTLY
Status: DISCONTINUED | OUTPATIENT
Start: 2023-09-24 | End: 2023-09-24

## 2023-09-24 RX ORDER — ROSUVASTATIN CALCIUM 20 MG/1
40 TABLET, COATED ORAL DAILY
Status: DISCONTINUED | OUTPATIENT
Start: 2023-09-24 | End: 2023-09-25 | Stop reason: HOSPADM

## 2023-09-24 RX ORDER — CLOPIDOGREL BISULFATE 75 MG/1
75 TABLET ORAL DAILY
Status: DISCONTINUED | OUTPATIENT
Start: 2023-09-25 | End: 2023-09-25 | Stop reason: HOSPADM

## 2023-09-24 RX ORDER — ASPIRIN 325 MG
325 TABLET ORAL DAILY
Status: DISCONTINUED | OUTPATIENT
Start: 2023-09-24 | End: 2023-09-25 | Stop reason: HOSPADM

## 2023-09-24 RX ORDER — SODIUM CHLORIDE 9 MG/ML
40 INJECTION, SOLUTION INTRAVENOUS AS NEEDED
Status: DISCONTINUED | OUTPATIENT
Start: 2023-09-24 | End: 2023-09-25 | Stop reason: HOSPADM

## 2023-09-24 RX ORDER — ASPIRIN 300 MG/1
300 SUPPOSITORY RECTAL DAILY
Status: DISCONTINUED | OUTPATIENT
Start: 2023-09-24 | End: 2023-09-25 | Stop reason: HOSPADM

## 2023-09-24 RX ORDER — PANTOPRAZOLE SODIUM 40 MG/1
40 TABLET, DELAYED RELEASE ORAL
Status: DISCONTINUED | OUTPATIENT
Start: 2023-09-25 | End: 2023-09-25 | Stop reason: HOSPADM

## 2023-09-24 RX ORDER — DOCUSATE SODIUM 100 MG/1
100 CAPSULE, LIQUID FILLED ORAL 2 TIMES DAILY PRN
Status: DISCONTINUED | OUTPATIENT
Start: 2023-09-24 | End: 2023-09-25 | Stop reason: HOSPADM

## 2023-09-24 RX ORDER — ONDANSETRON 2 MG/ML
4 INJECTION INTRAMUSCULAR; INTRAVENOUS EVERY 6 HOURS PRN
Status: DISCONTINUED | OUTPATIENT
Start: 2023-09-24 | End: 2023-09-25 | Stop reason: HOSPADM

## 2023-09-24 RX ORDER — BISACODYL 10 MG
10 SUPPOSITORY, RECTAL RECTAL DAILY PRN
Status: DISCONTINUED | OUTPATIENT
Start: 2023-09-24 | End: 2023-09-25 | Stop reason: HOSPADM

## 2023-09-24 RX ADMIN — CLOPIDOGREL BISULFATE 300 MG: 75 TABLET ORAL at 13:50

## 2023-09-24 RX ADMIN — ACETAMINOPHEN 650 MG: 325 TABLET ORAL at 20:29

## 2023-09-24 RX ADMIN — Medication 10 ML: at 20:06

## 2023-09-24 RX ADMIN — IOPAMIDOL 100 ML: 755 INJECTION, SOLUTION INTRAVENOUS at 11:54

## 2023-09-24 RX ADMIN — ROSUVASTATIN 40 MG: 20 TABLET, FILM COATED ORAL at 15:36

## 2023-09-24 RX ADMIN — ASPIRIN 162 MG: 81 TABLET, CHEWABLE ORAL at 11:22

## 2023-09-24 RX ADMIN — AMLODIPINE BESYLATE 10 MG: 5 TABLET ORAL at 15:26

## 2023-09-24 NOTE — H&P
[facility] HISTORY AND PHYSICAL  Date: 2023   Patient Name: Evan Yoon  : 1950  MRN: [id number]  Primary Care Physician:  John Fischer MD  Date of admission: 2023    Subjective   Subjective     Chief Complaint: left arm weakness     HPI:    Evan Yoon is a 73 y.o. male with history of vascular disease, hypertension, sleep apnea who presented to the ER for left-sided numbness and weakness.  Patient states that he was fine when he woke up this morning around 815 however he had a coughing episode and at the end of the coughing episode he noticed that his left arm was weak so much so that he could not move it and it felt numb.  Patient also noted some left lower extremity numbness with no weakness.  Patient called his wife who was upstairs and she states that he did not appear to have any facial droop.  Patient was actually using his right arm to lift his left arm.  Patient has never had any episode like this before.  The entire episode lasted a couple minutes however when he went to the emergency room he is completely asymptomatic.  Patient was evaluated in the ER and had a CT angiogram of the head which showed no evidence of large vessel occlusion.  Patient had 56% stenosis of the left internal carotid artery as well as 50% stenosis of the right subclavian artery and a densely calcified plaque at the origin of the left vertebral artery with probable high-grade stenosis.  Patient also had a short segment of occlusion of the left vertebral artery at the C6/C7 level that reconstituted distally.  Patient CT of the head did not show any abnormalities.    Patient was evaluated by tele neurology and it was recommended that he have dual antiplatelet therapy and high-dose statin for stroke risk reduction.  It was not recommended for admission to the hospital for further work-up and evaluation of his symptoms.      Personal History     Past Medical History:  Past Medical History:    Diagnosis Date    Alvarado esophagus     GERD (gastroesophageal reflux disease)     Hypertension     Sleep apnea     uses CPAP       Past Surgical History:  Past Surgical History:   Procedure Laterality Date    BACK SURGERY      CAROTID ENDARTERECTOMY Right     COLONOSCOPY N/A 3/7/2017    Procedure: COLONOSCOPY TO CECUM AND TERMINAL ILEUM WITH COLD BIOPSY POLYPECTOMIES;  Surgeon: Dago KEMP MD;  Location:  [facility];  Service:     COLONOSCOPY N/A 2021    Procedure: COLONOSCOPY TO CECUM/TI;  Surgeon: Dago Abbott MD;  Location:  [facility];  Service: Gastroenterology;  Laterality: N/A;  pre: HX OF POLYPS  post: HEMORRHOIDS, DIVERTICULOSIS, OTHERWISE NORMAL TO TI    ENDOSCOPY N/A 3/7/2017    Procedure: ESOPHAGOGASTRODUODENOSCOPY WITH BIOPSIES;  Surgeon: Dago KEMP MD;  [facility];  Service:     ENDOSCOPY N/A 2019    Procedure: ESOPHAGOGASTRODUODENOSCOPY with biopsies;  Surgeon: Dago Abbott MD;  Location: [facility] ENDOSCOPY;  Service: Gastroenterology    ENDOSCOPY N/A 2021    Procedure: ESOPHAGOGASTRODUODENOSCOPY WITH BX'S;  Surgeon: Dago Abbott MD;  Location: [facility] ENDOSCOPY;  Service: Gastroenterology;  Laterality: N/A;  pre: GERD, ALVARADO'S ESOPHAGUS  post: ALVARADO'S ESOPHAGUS, ESOPHAGITIS, HIATAL HERNIA,GASTRITIS    KNEE ARTHROSCOPY Left        Family History:   Family History   Problem Relation Age of Onset    Colon cancer Maternal Aunt        Social History:   Social History     Socioeconomic History    Marital status:    Tobacco Use    Smoking status: Former     Types: Cigarettes     Quit date: 1990     Years since quittin.7    Smokeless tobacco: Never   Substance and Sexual Activity    Alcohol use: Yes     Comment: social     Drug use: Never    Sexual activity: Defer       Home Medications:  B Complex Vitamins, Fish Oil, albuterol sulfate HFA, amLODIPine, aspirin, cholecalciferol, hydroCHLOROthiazide,  lansoprazole, moexipril, multivitamin, rosuvastatin, tadalafil, and vitamin E    Allergies:  No Known Allergies    Review of Systems   History obtained from the patient  General ROS: Negative for - chills, fever, malaise, or night sweats  Psychological ROS: negative for - anxiety, depression, hallucinations, or mood swings  Ophthalmic ROS: negative for - blurry vision, double vision, or itchy eyes  ENT ROS: negative for - headaches, nasal congestion, sneezing, or sore throat  Hematological and Lymphatic ROS: negative for - bleeding problems, bruising, or jaundice  Endocrine ROS: negative for - malaise/lethargy, polydipsia/polyuria, or temperature intolerance  Respiratory ROS: negative for - cough, orthopnea, shortness of breath, sputum changes, tachypnea, or wheezing  Cardiovascular ROS: negative for - chest pain, dyspnea on exertion, edema, palpitations, or paroxysmal nocturnal dyspnea  Gastrointestinal ROS: negative for - abdominal pain, constipation, diarrhea, heartburn, hematemesis, or nausea/vomiting  Genito-Urinary ROS: negative for - change in urinary stream, hematuria, incontinence, or urinary frequency/urgency  Musculoskeletal ROS: negative for - joint stiffness, joint swelling, muscle pain, or muscular weakness  Neurological ROS: negative for - confusion, dizziness, gait disturbance, headaches, impaired coordination/balance, memory loss, numbness/tingling, seizures, or visual changes  Dermatological ROS: negative for dry skin and rash       Objective   Objective     Vitals:   Temp:  [98.8 °F (37.1 °C)] 98.8 °F (37.1 °C)  Heart Rate:  [87-90] 90  Resp:  [18] 18  BP: (172)/(97) 172/97    Physical Exam    Constitutional: Awake, alert, no acute distress resting in bed. Family at the bedside    Eyes: Pupils equal, sclerae anicteric, no conjunctival injection   HENT: NCAT, mucous membranes moist   Neck: Supple, no thyromegaly, no lymphadenopathy, trachea midline   Respiratory: Clear to auscultation bilaterally,  nonlabored respirations    Cardiovascular: RRR, no murmurs, rubs, or gallops, palpable pedal pulses bilaterally   Gastrointestinal: Positive bowel sounds, soft, nontender, nondistended   Musculoskeletal: No bilateral ankle edema, no clubbing or cyanosis to extremities   Psychiatric: Appropriate affect, cooperative   Neurologic: Oriented x 3, strength symmetric in all extremities, Cranial Nerves grossly intact to confrontation, speech clear   Skin: No rashes     Result Review    Result Review:  I have personally reviewed the results from the time of this admission to 9/24/2023 13:34 EDT and agree with these findings:  [x]  Laboratory  CBC          9/24/2023    11:10   CBC   WBC 5.66    RBC 4.47    Hemoglobin 15.4    Hematocrit 44.2    MCV 98.9    MCH 34.5    MCHC 34.8    RDW 12.8    Platelets 170      BMP          9/24/2023    11:10   BMP   BUN 7    Creatinine 0.73    Sodium 139    Potassium 4.1    Chloride 102    CO2 23.2    Calcium 9.2        [x]  Microbiology  No results found for: ACANTHNAEG, AFBCX, BPERTUSSISCX, BLOODCX  No results found for: BCIDPCR, CXREFLEX, CSFCX, CULTURETIS  No results found for: CULTURES, HSVCX, URCX  No results found for: EYECULTURE, GCCX, HSVCULTURE, LABHSV  No results found for: LEGIONELLA, MRSACX, MUMPSCX, MYCOPLASCX  No results found for: NOCARDIACX, STOOLCX  No results found for: THROATCX, UNSTIMCULT, URINECX, CULTURE, VZVCULTUR  No results found for: VIRALCULTU, WOUNDCX    [x]  Radiology     CT-scan of the brain  MPRESSION:  No acute intracranial abnormalities are identified.     CTA of neck   Impression:          1. 56% stenosis of the proximal left internal carotid artery.     2.  Short segment of occlusion of the left vertebral artery at the C6-C7 level with reconstitution  distally.     3. 50% stenosis of the proximal right subclavian artery.  Densely calcified plaque at the origin of  the left vertebral artery with probable high-grade stenosis.            MYNOR MCKNIGHT MD         Electronically Signed and Approved By: MYNOR MCKNIGHT MD on 9/24/2023 at 12:56                 [x]  EKG/Telemetry   EKG: NSR   []  Cardiology/Vascular   []  Pathology  []  Old records  []  Other:      Assessment & Plan   Assessment / Plan     Assessment/Plan:   TIA with left side weakness   Hypertension  Carotid restenosis with history of carotid endarterectomy on the right  Hyperlipidemia  Obesity with a BMI of 33    PLAN  -- Patient will be admitted to monitored bed  --Tele Neurology is following  --Obtain echo and MRI of brain  -- Continue patient on home aspirin however Plavix has been added  -- Continue high intensity statin  -- Continue neurochecks  -- Obtain lipid panel, A1c and thyroid studies   -- PT/OT, speech evaluation        DVT prophylaxis:  Mechanical DVT prophylaxis orders are present.    CODE STATUS:    Code Status (Patient has no pulse and is not breathing): CPR (Attempt to Resuscitate)  Medical Interventions (Patient has pulse or is breathing): Full Support      Admission Status:  I believe this patient meets inpatient status.    Electronically signed by Nash Lozada DO, 09/24/23, 1:34 PM EDT.

## 2023-09-24 NOTE — ED PROVIDER NOTES
Time: 10:35 AM EDT  Date of encounter:  9/24/2023  Independent Historian/Clinical History and Information was obtained by:   Patient    History is limited by: N/A    Chief Complaint: Left-sided numbness and weakness      History of Present Illness:  Patient is a 73 y.o. year old male who presents to the emergency department for evaluation of left-sided numbness and weakness.  Patient was normal when he woke up this morning but approximately 08 15 to 08 30 he had a minor coughing episode.  At the end of this coughing episode he noticed that his left arm lost control/strength and was very numb.  He also noted left facial and left lower extremity numbness.  He called his wife who is upstairs and she states that he never had any facial droop that she could tell.  Patient denies headache, vision changes or speech issues.  Entire episode lasted 1 to 2 minutes and is currently in the emergency department he is asymptomatic.  Wife states that when she got downstairs patient was using his right arm to move his left arm due to the severity of his weakness/discoordination.    HPI    Patient Care Team  Primary Care Provider: John Fischer MD    Past Medical History:     No Known Allergies  Past Medical History:   Diagnosis Date    Penaloza esophagus     GERD (gastroesophageal reflux disease)     Hypertension     Sleep apnea     uses CPAP     Past Surgical History:   Procedure Laterality Date    BACK SURGERY      CAROTID ENDARTERECTOMY Right     COLONOSCOPY N/A 3/7/2017    Procedure: COLONOSCOPY TO CECUM AND TERMINAL ILEUM WITH COLD BIOPSY POLYPECTOMIES;  Surgeon: Dago KEMP MD;  Location: [facility] ENDOSCOPY;  Service:     COLONOSCOPY N/A 7/22/2021    Procedure: COLONOSCOPY TO CECUM/TI;  Surgeon: Dago Abbott MD;  Location: [facility] ENDOSCOPY;  Service: Gastroenterology;  Laterality: N/A;  pre: HX OF POLYPS  post: HEMORRHOIDS, DIVERTICULOSIS, OTHERWISE NORMAL TO TI    ENDOSCOPY N/A 3/7/2017    Procedure:  ESOPHAGOGASTRODUODENOSCOPY WITH BIOPSIES;  Surgeon: Dago KEMP MD;  Location: [facility] ENDOSCOPY;  Service:     ENDOSCOPY N/A 4/23/2019    Procedure: ESOPHAGOGASTRODUODENOSCOPY with biopsies;  Surgeon: Dago Abbott MD;  Location: [facility] ENDOSCOPY;  Service: Gastroenterology    ENDOSCOPY N/A 7/22/2021    Procedure: ESOPHAGOGASTRODUODENOSCOPY WITH BX'S;  Surgeon: Dago Abbott MD;  Location: [facility] ENDOSCOPY;  Service: Gastroenterology;  Laterality: N/A;  pre: GERD, ALVARADO'S ESOPHAGUS  post: ALVARADO'S ESOPHAGUS, ESOPHAGITIS, HIATAL HERNIA,GASTRITIS    KNEE ARTHROSCOPY Left      Family History   Problem Relation Age of Onset    Colon cancer Maternal Aunt        Home Medications:  Prior to Admission medications    Medication Sig Start Date End Date Taking? Authorizing Provider   amLODIPine (NORVASC) 10 MG tablet Take 10 mg by mouth Daily. 5/24/21   Constantino Huston MD   aspirin 81 MG chewable tablet Chew 81 mg Daily.    Constantino Huston MD   B Complex Vitamins (VITAMIN B COMPLEX PO) Take  by mouth.    Constantino Huston MD   cholecalciferol (VITAMIN D3) 1000 UNITS tablet Take 1,000 Units by mouth Daily.    Constantino Huston MD   hydroCHLOROthiazide (HYDRODIURIL) 25 MG tablet Take 25 mg by mouth Daily. 5/24/21   Constantino Huston MD   lansoprazole (PREVACID) 30 MG capsule Take 30 mg by mouth Daily.    Constantino Huston MD   moexipril (UNIVASC) 15 MG tablet Take 15 mg by mouth Every Night.    Constantino Huston MD   Multiple Vitamin (MULTI VITAMIN DAILY PO) Take  by mouth.    Constantino Huston MD   Omega-3 Fatty Acids (FISH OIL) 1200 MG capsule delayed-release Take  by mouth.    Constantino Huston MD   rosuvastatin (CRESTOR) 40 MG tablet Take 40 mg by mouth Daily.    Constantino Huston MD   tadalafil (CIALIS) 5 MG tablet Take 5 mg by mouth Daily. 5/24/21   Constantino Huston MD   Ventolin  (90 Base) MCG/ACT inhaler Inhale 2 puffs Every 4 (Four)  "Hours As Needed. 21   ProviderConstantino MD   vitamin E 400 UNIT capsule Take 400 Units by mouth Daily.    ProviderConstantino MD        Social History:   Social History     Tobacco Use    Smoking status: Former     Types: Cigarettes     Quit date:      Years since quittin.7    Smokeless tobacco: Never   Substance Use Topics    Alcohol use: Yes     Comment: social     Drug use: Never         Review of Systems:  Review of Systems   Constitutional:  Negative for chills and fever.   HENT:  Negative for congestion, rhinorrhea and sore throat.    Eyes:  Negative for photophobia.   Respiratory:  Negative for apnea, cough, chest tightness and shortness of breath.    Cardiovascular:  Negative for chest pain and palpitations.   Gastrointestinal:  Negative for abdominal pain, diarrhea, nausea and vomiting.   Endocrine: Negative.    Genitourinary:  Negative for difficulty urinating and dysuria.   Musculoskeletal:  Negative for back pain, joint swelling and myalgias.   Skin:  Negative for color change and wound.   Allergic/Immunologic: Negative.    Neurological:  Positive for weakness and numbness. Negative for seizures, facial asymmetry, speech difficulty and headaches.   Psychiatric/Behavioral: Negative.     All other systems reviewed and are negative.     Physical Exam:  /97 (BP Location: Left arm, Patient Position: Lying)   Pulse 90   Temp 98.8 °F (37.1 °C) (Oral)   Resp 18   Ht 180.3 cm (71\")   Wt 109 kg (240 lb 1.3 oz)   SpO2 90%   BMI 33.48 kg/m²     Physical Exam  Vitals and nursing note reviewed.   Constitutional:       General: He is awake.      Appearance: Normal appearance.   HENT:      Head: Normocephalic and atraumatic.      Nose: Nose normal.      Mouth/Throat:      Mouth: Mucous membranes are moist.   Eyes:      Extraocular Movements: Extraocular movements intact.      Pupils: Pupils are equal, round, and reactive to light.   Cardiovascular:      Rate and Rhythm: Normal rate and " regular rhythm.      Heart sounds: Normal heart sounds.   Pulmonary:      Effort: Pulmonary effort is normal. No respiratory distress.      Breath sounds: Normal breath sounds. No wheezing, rhonchi or rales.   Abdominal:      General: Bowel sounds are normal.      Palpations: Abdomen is soft.      Tenderness: There is no abdominal tenderness. There is no guarding or rebound.      Comments: No rigidity   Musculoskeletal:         General: No tenderness. Normal range of motion.      Cervical back: Normal range of motion and neck supple.   Skin:     General: Skin is warm and dry.      Coloration: Skin is not jaundiced.   Neurological:      General: No focal deficit present.      Mental Status: He is oriented to person, place, and time. Mental status is at baseline.      Cranial Nerves: No cranial nerve deficit.      Sensory: No sensory deficit.      Motor: No weakness.      Coordination: Coordination normal.   Psychiatric:         Mood and Affect: Mood normal.                Procedures:  Procedures      Medical Decision Making:      Comorbidities that affect care:    History of carotid endarterectomy, peripheral vascular disease, hypertension, GERD    External Notes reviewed:    Previous Clinic Note: 5/30/2023 office visit with vascular surgery for history of right-sided carotid endarterectomy.  Asymptomatic stenosis of left carotid artery.      The following orders were placed and all results were independently analyzed by me:  Orders Placed This Encounter   Procedures    CT Head Without Contrast    CT Angiogram Head    CT Angiogram Neck    Protime-INR    aPTT    Comprehensive Metabolic Panel    CBC Auto Differential    Hospitalist (on-call MD unless specified)    POC Glucose STAT    POC Glucose Once    ECG 12 Lead Stroke Evaluation    Insert Peripheral IV    CBC & Differential       Medications Given in the Emergency Department:  Medications   sodium chloride 0.9 % flush 10 mL (has no administration in time range)    clopidogrel (PLAVIX) tablet 300 mg (has no administration in time range)   aspirin chewable tablet 162 mg (162 mg Oral Given 9/24/23 1122)   iopamidol (ISOVUE-370) 76 % injection 100 mL (100 mL Intravenous Given 9/24/23 1154)        ED Course:    ED Course as of 09/24/23 1332   Sun Sep 24, 2023   1105 Case discussed with teleneurology on-call.  They do recommend CT head and CTA head and neck.  No indication for CT perfusion at this time as symptoms have resolved and patient has not a TNK candidate. [RP]      ED Course User Index  [RP] Chalino Cedillo MD       Labs:    Lab Results (last 24 hours)       Procedure Component Value Units Date/Time    POC Glucose Once [[id number]]  (Abnormal) Collected: 09/24/23 1109    Specimen: Blood Updated: 09/24/23 1114     Glucose 106 mg/dL      Comment: Serial Number: [id number]:  [id number]       Protime-INR [[id number]]  (Normal) Collected: 09/24/23 1110    Specimen: Blood Updated: 09/24/23 1122     Protime 14.0 Seconds      INR 1.07    Narrative:      Suggested Therapeutic Ranges For Oral Anticoagulant Therapy:  Level of Therapy                      INR Target Range  Standard Dose                            2.0-3.0  High Dose                                2.5-3.5  Patients not receiving anticoagulant  Therapy Normal Range                     0.86-1.15    aPTT [[id number]]  (Abnormal) Collected: 09/24/23 1110    Specimen: Blood Updated: 09/24/23 1123     PTT 27.9 seconds     CBC & Differential [[id number]]  (Abnormal) Collected: 09/24/23 1110    Specimen: Blood Updated: 09/24/23 1114    Narrative:      The following orders were created for panel order CBC & Differential.  Procedure                               Abnormality         Status                     ---------                               -----------         ------                     CBC Auto Differential[[id number]]        Abnormal            Final result                 Please view results for these tests on the  individual orders.    Comprehensive Metabolic Panel [[id number]]  (Abnormal) Collected: 09/24/23 1110    Specimen: Blood Updated: 09/24/23 1132     Glucose 104 mg/dL      BUN 7 mg/dL      Creatinine 0.73 mg/dL      Sodium 139 mmol/L      Potassium 4.1 mmol/L      Chloride 102 mmol/L      CO2 23.2 mmol/L      Calcium 9.2 mg/dL      Total Protein 7.3 g/dL      Albumin 4.4 g/dL      ALT (SGPT) 31 U/L      AST (SGOT) 53 U/L      Alkaline Phosphatase 53 U/L      Total Bilirubin 0.9 mg/dL      Globulin 2.9 gm/dL      A/G Ratio 1.5 g/dL      BUN/Creatinine Ratio 9.6     Anion Gap 13.8 mmol/L      eGFR 95.5 mL/min/1.73     Narrative:      GFR Normal >60  Chronic Kidney Disease <60  Kidney Failure <15    The GFR formula is only valid for adults with stable renal function between ages 18 and 70.    CBC Auto Differential [[id number]]  (Abnormal) Collected: 09/24/23 1110    Specimen: Blood Updated: 09/24/23 1114     WBC 5.66 10*3/mm3      RBC 4.47 10*6/mm3      Hemoglobin 15.4 g/dL      Hematocrit 44.2 %      MCV 98.9 fL      MCH 34.5 pg      MCHC 34.8 g/dL      RDW 12.8 %      RDW-SD 46.0 fl      MPV 9.3 fL      Platelets 170 10*3/mm3      Neutrophil % 70.1 %      Lymphocyte % 12.4 %      Monocyte % 13.8 %      Eosinophil % 3.0 %      Basophil % 0.5 %      Immature Grans % 0.2 %      Neutrophils, Absolute 3.97 10*3/mm3      Lymphocytes, Absolute 0.70 10*3/mm3      Monocytes, Absolute 0.78 10*3/mm3      Eosinophils, Absolute 0.17 10*3/mm3      Basophils, Absolute 0.03 10*3/mm3      Immature Grans, Absolute 0.01 10*3/mm3      nRBC 0.0 /100 WBC              Imaging:    CT Head Without Contrast    Result Date: 9/24/2023  PROCEDURE: CT HEAD WO CONTRAST  COMPARISON:  None INDICATIONS: headache/LEFT SIDED WEAKNESS  PROTOCOL:   Standard imaging protocol performed    RADIATION:   DLP: 1023.8 mGy*cm   MA and/or KV was adjusted to minimize radiation dose.     TECHNIQUE: Axial images of the head without intravenous contrast.  FINDINGS:   There is generalized atrophy.  There are mild chronic appearing changes in the white matter.  The ventricles have a normal size and configuration. There is no evidence of acute intracranial hemorrhage, mass or midline shift. No extra-axial fluid collections are identified. There are no skull fractures. The visualized paranasal sinuses and mastoid air cells are grossly clear.  IMPRESSION:  No acute intracranial abnormalities are identified.  MYNOR MCKNIGHT MD       Electronically Signed and Approved By: MYNOR MCKNIGHT MD on 9/24/2023 at 12:10             CT Angiogram Neck    Result Date: 9/24/2023  PROCEDURE: CT ANGIOGRAM NECK  COMPARISON: CT, CT ANGIOGRAM HEAD, 9/24/2023, 11:57.  INDICATIONS: Left-sided weakness.  Known carotid artery stenosis  PROTOCOL:   Standard imaging protocol performed    RADIATION:   DLP: 677.9 mGy*cm   Automated exposure control was utilized to minimize radiation dose. CONTRAST: 90 cc Isovue 370 I.V. LABS:   eGFR: >60 ml/min/1.73m2  TECHNIQUE: After obtaining the patient's consent, CT images of the neck were obtained without and with non-ionic intravenous contrast material. Multi-planar reformatted/3-D images were created to optimize visualization of vascular anatomy. Unless otherwise stated in this report, all vascular stenoses involving the internal carotid arteries reported for this examination are derived by dividing the lesion diameter by the diameter of the normal internal carotid artery more distally.  FINDINGS:  LEFT INTERNAL CAROTID: No hemodynamically significant stenosis or dissection.  Calcified plaque with 56% luminal stenosis. EXTERNAL CAROTID: No hemodynamically significant stenosis or dissection.  COMMON CAROTID: No hemodynamically significant stenosis or dissection.  VERTEBRAL: There appears to be occlusion of the left vertebral artery at the C6-C7 level with reconstitution distally.  Densely calcified plaque at the origin of the left vertebral artery with probable  high-grade stenosis.  RIGHT INTERNAL CAROTID: No hemodynamically significant stenosis or dissection.  EXTERNAL CAROTID: No hemodynamically significant stenosis or dissection.  COMMON CAROTID: No hemodynamically significant stenosis or dissection.  VERTEBRAL: No hemodynamically significant stenosis or dissection.   OTHER: The visualized soft tissues of the neck are also unremarkable.  55% stenosis of the proximal right subclavian artery.  Degenerative changes are present in the cervical spine.  There is scarring in the lung apices.         1. 56% stenosis of the proximal left internal carotid artery.  2.  Short segment of occlusion of the left vertebral artery at the C6-C7 level with reconstitution distally.  3. 50% stenosis of the proximal right subclavian artery.  Densely calcified plaque at the origin of the left vertebral artery with probable high-grade stenosis.     MYNOR MCKNIGHT MD       Electronically Signed and Approved By: MYNOR MCKNIGHT MD on 9/24/2023 at 12:56             CT Angiogram Head    Result Date: 9/24/2023  PROCEDURE: CT ANGIOGRAM HEAD  COMPARISON: [facility], CT, CT ANGIOGRAM NECK, 9/24/2023, 11:57.  [facility], CT, CT HEAD WO CONTRAST, 9/24/2023, 11:54.  INDICATIONS: Left-sided weakness.  Known carotid artery stenosis  PROTOCOL:   Standard imaging protocol performed    RADIATION:      MA and/or KV was adjusted to minimize radiation dose.     TECHNIQUE: After obtaining the patient's consent, CT images of the head were obtained without and with non-ionic contrast, and multi-planar/3-D imaging were created and interpreted to optimize visualization of vascular anatomy.   FINDINGS:   The intracranial portion of the internal carotid arteries, major MCA and JOLENE branches are patent.  There is atherosclerotic calcification in the cavernous ICAs.  The distal vertebral arteries, basilar artery and posterior cerebral arteries are patent.  There is atherosclerotic  calcification in the vertebral arteries at the level of the foramen magnum.  No evidence of large vessel occlusion, dissection or aneurysm.  IMPRESSION: Atherosclerotic disease.  No evidence of large vessel occlusion, aneurysm or dissection.   MYNOR MCKNIGHT MD       Electronically Signed and Approved By: MYNOR MCKNIGHT MD on 9/24/2023 at 13:01                Differential Diagnosis and Discussion:    Stroke: Differential diagnosis in this patient with signs of possible ischemic stroke include TIA or ischemic stroke, hemorrhagic stroke, hypoglycemic episode, toxic or metabolic encephalopathy, paresthesias.    All labs were reviewed and interpreted by me.  EKG was interpreted by me.  CT scan radiology impression was interpreted by me.    MDM           Patient Care Considerations:          Consultants/Shared Management Plan:    Hospitalist: I have discussed the case with Dr. Lozada who agrees to accept the patient for admission.  Consultant: I have discussed the case with teleneurology on-call, Dr. Smith who states patient should be started on dual antiplatelet therapy and high-dose statin.  Recommends admission to the hospital for further stroke work-up.    Social Determinants of Health:    Patient is independent, reliable, and has access to care.       Disposition and Care Coordination:    Admit:   Through independent evaluation of the patient's history, physical, and imperical data, the patient meets criteria for observation/admission to the hospital.        Final diagnoses:   TIA (transient ischemic attack)        ED Disposition       ED Disposition   Decision to Admit    Condition   --    Comment   --               This medical record created using voice recognition software.             Chalino Cedillo MD  09/24/23 3716

## 2023-09-24 NOTE — CONSULTS
[facility]  [facility]      Patient Name:   Evan Yoon  YOB: 1950  Identification Number:   MRN - [id number]  Date of Service:   09/24/2023 10:36:13    Diagnosis:        G45.9 - Transient cerebral ischemic attack, unspecified    Impression:       74 yo male with a history of hypertension presents for now resolved left hemibody numbness and left arm paralysis. His NIHSS is zero. CT head is negative for acute infarct or hemorrhage. CTA head and neck demonstrates diffuse moderate to severe atherosclerosis and an occluded left V1 vertebral artery segment. Recommend DAPT and high dose statin for stroke risk reduction. Recommend admission for stroke work up per protocol. Optimize medical comorbidities. Neurology will follow.    Our recommendations are outlined below.    Recommendations:          Stroke/Telemetry Floor        Neuro Checks        Bedside Swallow Eval        DVT Prophylaxis        IV Fluids, Normal Saline        Head of Bed 30 Degrees        Euglycemia and Avoid Hyperthermia (PRN Acetaminophen)    Recommended Scan:       MRI Head Without Contrast       Echocardiogram - Transthoracic Echocardiogram       Routine CTA Head and neck    Lipid Panel to Be Obtained, if Not Done in the Last 30 Days    Therapies:        Physical Therapy, Occupational Therapy, Speech Therapy Assessment When Applicable    Dysphagia:        Swallow Evaluation, Bedside        NPO Until Swallow Evaluation    DVT prophylaxis:        Choice of Primary Team    Disposition:        Neurology Follow Up Recommended    Sign Out:        Discussed with Emergency Department Provider        ------------------------------------------------------------------------------    Advanced Imaging:  CTA Head and Neck Completed.    LVO:No    Patient in not a candidate for NAMAN      Metrics:  Last Known Well: 09/24/2023 08:15:00  [facility] Notification Time: 09/24/2023 10:35:13  Arrival Time:  09/24/2023 09:53:00  Stamp Time: 09/24/2023 10:36:13  Initial Response Time: 09/24/2023 10:37:05  Symptoms: Weakness.  Initial patient interaction: 09/24/2023 10:45:07  NIHSS Assessment Completed: 09/24/2023 10:48:53  Patient is not a candidate for Thrombolytic.  Thrombolytic Medical Decision: 09/24/2023 10:48:54  Patient was not deemed candidate for Thrombolytic because of following reasons:  Resolved symptoms (no residual disabling symptoms).    CT head showed no acute hemorrhage or acute core infarct.    Primary Provider Notified of Diagnostic Impression and Management Plan on: 09/24/2023 11:02:36        ------------------------------------------------------------------------------    History of Present Illness:  Patient is a 73 year old Male.    Patient was brought by EMS for symptoms of Weakness.  74 yo male with a history of hypertension was normal today on waking. He developed a coughing spell. After the coughing he had sudden onset of left hemibody and facial numbness. Concurrently his left arm developed severe weakness. Those symptoms have now resolved. He denies vertigo, vision changes, dysarthria, nor dysphagia.      Past Medical History:       Hypertension       There is no history of Diabetes Mellitus       There is no history of Hyperlipidemia       There is no history of Atrial Fibrillation       There is no history of Stroke  Othere PMH:  adenomatous polyp of colon;    Medications:    No Anticoagulant use   Antiplatelet use: Yes aspirin;  Reviewed EMR for current medications    Allergies:   NKDA    Social History:  Smoking: Former  Alcohol Use: Yes  Drug Use: No    Family History:    There is no family history of premature cerebrovascular disease pertinent to this consultation    ROS :  14 Points Review of Systems was performed and was negative except mentioned in HPI.    Past Surgical History:  There Is No Surgical History Contributory To Today’s Visit  There Is Surgical History of:  right carotid  endarterectomy;        Examination:  BP(172/97), Pulse(73),  1A: Level of Consciousness - Alert; keenly responsive + 0  1B: Ask Month and Age - Both Questions Right + 0  1C: Blink Eyes & Squeeze Hands - Performs Both Tasks + 0  2: Test Horizontal Extraocular Movements - Normal + 0  3: Test Visual Fields - No Visual Loss + 0  4: Test Facial Palsy (Use Grimace if Obtunded) - Normal symmetry + 0  5A: Test Left Arm Motor Drift - No Drift for 10 Seconds + 0  5B: Test Right Arm Motor Drift - No Drift for 10 Seconds + 0  6A: Test Left Leg Motor Drift - No Drift for 5 Seconds + 0  6B: Test Right Leg Motor Drift - No Drift for 5 Seconds + 0  7: Test Limb Ataxia (FNF/Heel-Shin) - No Ataxia + 0  8: Test Sensation - Normal; No sensory loss + 0  9: Test Language/Aphasia - Normal; No aphasia + 0  10: Test Dysarthria - Normal + 0  11: Test Extinction/Inattention - No abnormality + 0    NIHSS Score: 0    Pre-Morbid Modified Greg Scale:  0 Points = No symptoms at all    Spoke with : Dr. Cedillo  I reviewed the available imaging via Rapid and initiated discussion with the primary provider    Patient/Family was informed the Neurology Consult would occur via TeleHealth consult by way of interactive audio and video telecommunications and consented to receiving care in this manner.      Patient is being evaluated for possible acute neurologic impairment and high probability of imminent or life-threatening deterioration. I spent total of 35 minutes providing care to this patient, including time for face to face visit via telemedicine, review of medical records, imaging studies and discussion of findings with providers, the patient and/or family.      Dr Yrn Smith      [facility]  For Inpatient follow-up with [facility] physician please call [facility] [phone]. This is not an outpatient service. Post hospital discharge, please contact hospital directly.

## 2023-09-24 NOTE — PLAN OF CARE
Goal Outcome Evaluation:        New pt this shift, waiting for mri to be done. Will continue to monitor

## 2023-09-25 ENCOUNTER — READMISSION MANAGEMENT (OUTPATIENT)
Dept: CALL CENTER | Facility: HOSPITAL | Age: 73
End: 2023-09-25

## 2023-09-25 ENCOUNTER — APPOINTMENT (OUTPATIENT)
Dept: CARDIOLOGY | Facility: HOSPITAL | Age: 73
End: 2023-09-25
Payer: MEDICARE

## 2023-09-25 VITALS
TEMPERATURE: 98.1 F | HEIGHT: 71 IN | DIASTOLIC BLOOD PRESSURE: 75 MMHG | WEIGHT: 240.08 LBS | BODY MASS INDEX: 33.61 KG/M2 | OXYGEN SATURATION: 96 % | HEART RATE: 73 BPM | RESPIRATION RATE: 18 BRPM | SYSTOLIC BLOOD PRESSURE: 153 MMHG

## 2023-09-25 PROBLEM — I63.9 ACUTE CVA (CEREBROVASCULAR ACCIDENT): Status: ACTIVE | Noted: 2023-09-25

## 2023-09-25 LAB
ANION GAP SERPL CALCULATED.3IONS-SCNC: 14.6 MMOL/L (ref 5–15)
BH CV ECHO MEAS - ACS: 1.7 CM
BH CV ECHO MEAS - AO MAX PG: 16 MMHG
BH CV ECHO MEAS - AO MEAN PG: 8 MMHG
BH CV ECHO MEAS - AO ROOT DIAM: 3.7 CM
BH CV ECHO MEAS - AO V2 MAX: 198 CM/SEC
BH CV ECHO MEAS - AO V2 VTI: 38.9 CM
BH CV ECHO MEAS - AVA(I,D): 1.9 CM2
BH CV ECHO MEAS - EDV(CUBED): 74.1 ML
BH CV ECHO MEAS - EDV(MOD-SP2): 48.3 ML
BH CV ECHO MEAS - EDV(MOD-SP4): 43.4 ML
BH CV ECHO MEAS - EF(MOD-BP): 55.7 %
BH CV ECHO MEAS - EF(MOD-SP2): 55.5 %
BH CV ECHO MEAS - EF(MOD-SP4): 56 %
BH CV ECHO MEAS - ESV(CUBED): 24.4 ML
BH CV ECHO MEAS - ESV(MOD-SP2): 21.5 ML
BH CV ECHO MEAS - ESV(MOD-SP4): 19.1 ML
BH CV ECHO MEAS - FS: 31 %
BH CV ECHO MEAS - IVS/LVPW: 1 CM
BH CV ECHO MEAS - IVSD: 1.1 CM
BH CV ECHO MEAS - LA DIMENSION: 3.5 CM
BH CV ECHO MEAS - LAT PEAK E' VEL: 6.6 CM/SEC
BH CV ECHO MEAS - LV DIASTOLIC VOL/BSA (35-75): 19 CM2
BH CV ECHO MEAS - LV MASS(C)D: 157.1 GRAMS
BH CV ECHO MEAS - LV MAX PG: 4.4 MMHG
BH CV ECHO MEAS - LV MEAN PG: 2 MMHG
BH CV ECHO MEAS - LV SYSTOLIC VOL/BSA (12-30): 8.4 CM2
BH CV ECHO MEAS - LV V1 MAX: 105 CM/SEC
BH CV ECHO MEAS - LV V1 VTI: 23.5 CM
BH CV ECHO MEAS - LVIDD: 4.2 CM
BH CV ECHO MEAS - LVIDS: 2.9 CM
BH CV ECHO MEAS - LVOT AREA: 3.1 CM2
BH CV ECHO MEAS - LVOT DIAM: 2 CM
BH CV ECHO MEAS - LVPWD: 1.1 CM
BH CV ECHO MEAS - MED PEAK E' VEL: 5.9 CM/SEC
BH CV ECHO MEAS - MV A MAX VEL: 122 CM/SEC
BH CV ECHO MEAS - MV DEC TIME: 0.25 SEC
BH CV ECHO MEAS - MV E MAX VEL: 81.8 CM/SEC
BH CV ECHO MEAS - MV E/A: 0.67
BH CV ECHO MEAS - RVDD: 2.6 CM
BH CV ECHO MEAS - SI(MOD-SP2): 11.8 ML/M2
BH CV ECHO MEAS - SI(MOD-SP4): 10.7 ML/M2
BH CV ECHO MEAS - SV(LVOT): 73.8 ML
BH CV ECHO MEAS - SV(MOD-SP2): 26.8 ML
BH CV ECHO MEAS - SV(MOD-SP4): 24.3 ML
BH CV ECHO MEAS - TAPSE (>1.6): 2.28 CM
BH CV ECHO MEASUREMENTS AVERAGE E/E' RATIO: 13.09
BUN SERPL-MCNC: 6 MG/DL (ref 8–23)
BUN/CREAT SERPL: 8.5 (ref 7–25)
CALCIUM SPEC-SCNC: 9.2 MG/DL (ref 8.6–10.5)
CHLORIDE SERPL-SCNC: 99 MMOL/L (ref 98–107)
CHOLEST SERPL-MCNC: 128 MG/DL (ref 0–200)
CO2 SERPL-SCNC: 23.4 MMOL/L (ref 22–29)
CREAT SERPL-MCNC: 0.71 MG/DL (ref 0.76–1.27)
DEPRECATED RDW RBC AUTO: 45.8 FL (ref 37–54)
EGFRCR SERPLBLD CKD-EPI 2021: 96.3 ML/MIN/1.73
ERYTHROCYTE [DISTWIDTH] IN BLOOD BY AUTOMATED COUNT: 12.7 % (ref 12.3–15.4)
GLUCOSE SERPL-MCNC: 121 MG/DL (ref 65–99)
HBA1C MFR BLD: 5.6 % (ref 4.8–5.6)
HCT VFR BLD AUTO: 42.5 % (ref 37.5–51)
HDLC SERPL-MCNC: 69 MG/DL (ref 40–60)
HGB BLD-MCNC: 14.4 G/DL (ref 13–17.7)
LDLC SERPL CALC-MCNC: 43 MG/DL (ref 0–100)
LDLC/HDLC SERPL: 0.61 {RATIO}
LEFT ATRIUM VOLUME INDEX: 20.5 ML/M2
MCH RBC QN AUTO: 33.5 PG (ref 26.6–33)
MCHC RBC AUTO-ENTMCNC: 33.9 G/DL (ref 31.5–35.7)
MCV RBC AUTO: 98.8 FL (ref 79–97)
PLATELET # BLD AUTO: 163 10*3/MM3 (ref 140–450)
PMV BLD AUTO: 9.5 FL (ref 6–12)
POTASSIUM SERPL-SCNC: 3.9 MMOL/L (ref 3.5–5.2)
RBC # BLD AUTO: 4.3 10*6/MM3 (ref 4.14–5.8)
SODIUM SERPL-SCNC: 137 MMOL/L (ref 136–145)
T4 FREE SERPL-MCNC: 1.18 NG/DL (ref 0.93–1.7)
TRIGL SERPL-MCNC: 83 MG/DL (ref 0–150)
TSH SERPL DL<=0.05 MIU/L-ACNC: 1.72 UIU/ML (ref 0.27–4.2)
VLDLC SERPL-MCNC: 16 MG/DL (ref 5–40)
WBC NRBC COR # BLD: 5.39 10*3/MM3 (ref 3.4–10.8)

## 2023-09-25 PROCEDURE — 85027 COMPLETE CBC AUTOMATED: CPT | Performed by: INTERNAL MEDICINE

## 2023-09-25 PROCEDURE — 99214 OFFICE O/P EST MOD 30 MIN: CPT | Performed by: PSYCHIATRY & NEUROLOGY

## 2023-09-25 PROCEDURE — 92610 EVALUATE SWALLOWING FUNCTION: CPT

## 2023-09-25 PROCEDURE — 80048 BASIC METABOLIC PNL TOTAL CA: CPT | Performed by: INTERNAL MEDICINE

## 2023-09-25 PROCEDURE — 93306 TTE W/DOPPLER COMPLETE: CPT

## 2023-09-25 PROCEDURE — 36415 COLL VENOUS BLD VENIPUNCTURE: CPT | Performed by: INTERNAL MEDICINE

## 2023-09-25 PROCEDURE — 84443 ASSAY THYROID STIM HORMONE: CPT | Performed by: INTERNAL MEDICINE

## 2023-09-25 PROCEDURE — 93306 TTE W/DOPPLER COMPLETE: CPT | Performed by: STUDENT IN AN ORGANIZED HEALTH CARE EDUCATION/TRAINING PROGRAM

## 2023-09-25 PROCEDURE — 83036 HEMOGLOBIN GLYCOSYLATED A1C: CPT | Performed by: INTERNAL MEDICINE

## 2023-09-25 PROCEDURE — 97161 PT EVAL LOW COMPLEX 20 MIN: CPT

## 2023-09-25 PROCEDURE — G0378 HOSPITAL OBSERVATION PER HR: HCPCS

## 2023-09-25 PROCEDURE — 80061 LIPID PANEL: CPT | Performed by: INTERNAL MEDICINE

## 2023-09-25 PROCEDURE — 84439 ASSAY OF FREE THYROXINE: CPT | Performed by: INTERNAL MEDICINE

## 2023-09-25 RX ORDER — CLOPIDOGREL BISULFATE 75 MG/1
75 TABLET ORAL DAILY
Qty: 30 TABLET | Refills: 0 | Status: SHIPPED | OUTPATIENT
Start: 2023-09-26 | End: 2023-10-26

## 2023-09-25 RX ADMIN — AMLODIPINE BESYLATE 10 MG: 5 TABLET ORAL at 08:50

## 2023-09-25 RX ADMIN — CLOPIDOGREL BISULFATE 75 MG: 75 TABLET ORAL at 08:50

## 2023-09-25 RX ADMIN — ROSUVASTATIN 40 MG: 20 TABLET, FILM COATED ORAL at 08:50

## 2023-09-25 RX ADMIN — Medication 10 ML: at 08:50

## 2023-09-25 RX ADMIN — PANTOPRAZOLE SODIUM 40 MG: 40 TABLET, DELAYED RELEASE ORAL at 05:50

## 2023-09-25 RX ADMIN — ASPIRIN 325 MG: 325 TABLET ORAL at 08:50

## 2023-09-25 NOTE — THERAPY EVALUATION
Acute Care - Physical Therapy Initial Evaluation   Porfirio     Patient Name: Evan Yoon  : 1950  MRN: [id number]  Today's Date: 2023      Visit Dx:     ICD-10-CM ICD-9-CM   1. TIA (transient ischemic attack)  G45.9 435.9   2. Dysphagia, unspecified type  R13.10 787.20   3. Difficulty in walking  R26.2 719.7     Patient Active Problem List   Diagnosis    Penaloza's esophagus without dysplasia    Gastroesophageal reflux disease    Adenomatous polyp of colon    TIA (transient ischemic attack)    Asymptomatic stenosis of left carotid artery    Benign essential hypertension    History of right-sided carotid endarterectomy    Hyperlipidemia    Stenosis of right subclavian artery     Past Medical History:   Diagnosis Date    Penaloza esophagus     Ex-smoker     GERD (gastroesophageal reflux disease)     Hypertension     Sleep apnea     uses CPAP     Past Surgical History:   Procedure Laterality Date    BACK SURGERY      CAROTID ENDARTERECTOMY Right     COLONOSCOPY N/A 3/7/2017    Procedure: COLONOSCOPY TO CECUM AND TERMINAL ILEUM WITH COLD BIOPSY POLYPECTOMIES;  Surgeon: Dago KEMP MD;  Location: [facility] ENDOSCOPY;  Service:     COLONOSCOPY N/A 2021    Procedure: COLONOSCOPY TO CECUM/TI;  Surgeon: Dago Abbott MD;  Location: [facility] ENDOSCOPY;  Service: Gastroenterology;  Laterality: N/A;  pre: HX OF POLYPS  post: HEMORRHOIDS, DIVERTICULOSIS, OTHERWISE NORMAL TO TI    ENDOSCOPY N/A 3/7/2017    Procedure: ESOPHAGOGASTRODUODENOSCOPY WITH BIOPSIES;  Surgeon: Dago KEMP MD;  Location: [facility] ENDOSCOPY;  Service:     ENDOSCOPY N/A 2019    Procedure: ESOPHAGOGASTRODUODENOSCOPY with biopsies;  Surgeon: Dago Abbott MD;  Location: [facility] ENDOSCOPY;  Service: Gastroenterology    ENDOSCOPY N/A 2021    Procedure: ESOPHAGOGASTRODUODENOSCOPY WITH BX'S;  Surgeon: Dago Abbott MD;  Location: [facility] ENDOSCOPY;  Service: Gastroenterology;  Laterality: N/A;  pre: GERD,  ALVARADO'S ESOPHAGUS  post: ALVARAOD'S ESOPHAGUS, ESOPHAGITIS, HIATAL HERNIA,GASTRITIS    KNEE ARTHROSCOPY Left      PT Assessment (last 12 hours)       PT Evaluation and Treatment       Row Name 09/25/23 1045          Physical Therapy Time and Intention    Subjective Information no complaints (P)   -ZT     Document Type evaluation (P)   -ZT     Mode of Treatment individual therapy;physical therapy (P)   -ZT     Patient Effort good (P)   -ZT       Row Name 09/25/23 1045          General Information    Patient Profile Reviewed yes (P)   -ZT     Patient Observations alert;cooperative;agree to therapy (P)   -ZT     Prior Level of Function independent:;all household mobility;community mobility;ADL's (P)   -ZT     Equipment Currently Used at Home none (P)   -ZT     Existing Precautions/Restrictions fall (P)   -ZT       Row Name 09/25/23 1045          Living Environment    Current Living Arrangements home (P)   -ZT     Home Accessibility wheelchair accessible (P)   -ZT     People in Home spouse (P)   -ZT     Primary Care Provided by self (P)   -ZT       Row Name 09/25/23 1045          Home Use of Assistive/Adaptive Equipment    Equipment Currently Used at Home none (P)   -ZT       [facility] Name 09/25/23 1045          Range of Motion (ROM)    Range of Motion bilateral lower extremities;ROM is WFL (P)   -ZT       [facility] Name 09/25/23 1045          Strength (Manual Muscle Testing)    Strength (Manual Muscle Testing) bilateral lower extremities;strength is WFL (P)   5/5 bilateral LE strength  -ZT       [facility] Name 09/25/23 1045          Transfers    Transfers sit-stand transfer;stand-sit transfer (P)   -ZT       Row Name 09/25/23 1045          Sit-Stand Transfer    Sit-Stand [place] (Transfers) independent (P)   -ZT       Row Name 09/25/23 1045          Stand-Sit Transfer    Stand-Sit [place] (Transfers) independent (P)   -ZT       Row Name 09/25/23 1045          Gait/Stairs (Locomotion)    Gait/Stairs Locomotion gait/ambulation  independence (P)   -ZT     [place] Level (Gait) independent (P)   -ZT     Distance in Feet (Gait) 300 (P)   -ZT     Pattern (Gait) step-through (P)   -ZT       Row Name 09/25/23 1045          Safety Issues, Functional Mobility    Impairments Affecting Function (Mobility) endurance/activity tolerance (P)   -ZT       Row Name 09/25/23 1045          Balance    Balance Assessment standing dynamic balance (P)   -ZT     Dynamic Standing Balance independent (P)   -ZT       Row Name 09/25/23 1045          Plan of Care Review    Plan of Care Reviewed With patient (P)   -ZT     Outcome Evaluation Pt presents with no strength or balance deficits and the ability to tolerate functional activity. He does not require skilled PT services at this time as he is fully functional with no safety concerns. (P)   -ZT       Row Name 09/25/23 1045          Therapy Assessment/Plan (PT)    Criteria for Skilled Interventions Met (PT) no;no problems identified which require skilled intervention (P)   -ZT     Therapy Frequency (PT) evaluation only (P)   -ZT       Row Name 09/25/23 1045          Therapy Plan Review/Discharge Plan (PT)    Therapy Plan Review (PT) evaluation/treatment results reviewed;care plan/treatment goals reviewed;participants included;patient (P)   -ZT               User Key  (r) = Recorded By, (t) = Taken By, (c) = Cosigned By      Initials Name Provider Type    ZT Torsten Ye, PT Student PT Student                    Physical Therapy Education       Title: PT OT SLP Therapies (Done)       Topic: Physical Therapy (Done)       Point: Mobility training (Done)       Learning Progress Summary             Patient Acceptance, E,TB, VU by ZT at 9/25/2023 1049                         Point: Home exercise program (Done)       Learning Progress Summary             Patient Acceptance, E,TB, VU by ZT at 9/25/2023 1049                         Point: Precautions (Done)       Learning Progress Summary             Patient  Acceptance, E,TB, VU by ZT at 9/25/2023 1049                                         User Key       Initials Effective Dates Name Provider Type Discipline    ZT 09/05/23 -  Torsten Ye, PT Student PT Student PT                  PT Recommendation and Plan  Anticipated Discharge Disposition (PT): (P) home  Therapy Frequency (PT): (P) evaluation only  Plan of Care Reviewed With: (P) patient  Outcome Evaluation: (P) Pt presents with no strength or balance deficits and the ability to tolerate functional activity. He does not require skilled PT services at this time as he is fully functional with no safety concerns.   Outcome Measures       Row Name 09/25/23 1000             How much help from another person do you currently need...    Turning from your back to your side while in flat bed without using bedrails? 4 (P)   -ZT      Moving from lying on back to sitting on the side of a flat bed without bedrails? 4 (P)   -ZT      Moving to and from a bed to a chair (including a wheelchair)? 4 (P)   -ZT      Standing up from a chair using your arms (e.g., wheelchair, bedside chair)? 4 (P)   -ZT      Climbing 3-5 steps with a railing? 4 (P)   -ZT      To walk in hospital room? 4 (P)   -ZT      AM-PAC 6 Clicks Score (PT) 24 (P)   -ZT                User Key  (r) = Recorded By, (t) = Taken By, (c) = Cosigned By      Initials Name Provider Type    ZT Torsten Ye, PT Student PT Student                     Time Calculation:    PT Charges       Row Name 09/25/23 1045             Time Calculation    PT Received On 09/25/23 (P)   -ZT      PT Goal Re-Cert Due Date 10/04/23 (P)   -ZT         Untimed Charges    PT Eval/Re-eval Minutes 25 (P)   -ZT         Total Minutes    Untimed Charges Total Minutes 25 (P)   -ZT       Total Minutes 25 (P)   -ZT                User Key  (r) = Recorded By, (t) = Taken By, (c) = Cosigned By      Initials Name Provider Type    ZT Torsten Ye, PT Student PT Student                      PT  G-Codes  AM-PAC 6 Clicks Score (PT): (P) 24    Torsten Ye, PT Student  9/25/2023     Rhabdomyolysis

## 2023-09-25 NOTE — THERAPY EVALUATION
Acute Care - Speech Language Pathology   Swallow Initial Evaluation  Porfirio     Patient Name: Evan Yoon  : 1950  MRN: [id number]  Today's Date: 2023               Admit Date: 2023    Visit Dx:     ICD-10-CM ICD-9-CM   1. TIA (transient ischemic attack)  G45.9 435.9   2. Dysphagia, unspecified type  R13.10 787.20     Patient Active Problem List   Diagnosis    Penaloza's esophagus without dysplasia    Gastroesophageal reflux disease    Adenomatous polyp of colon    TIA (transient ischemic attack)    Asymptomatic stenosis of left carotid artery    Benign essential hypertension    History of right-sided carotid endarterectomy    Hyperlipidemia    Stenosis of right subclavian artery     Past Medical History:   Diagnosis Date    Penaloza esophagus     Ex-smoker     GERD (gastroesophageal reflux disease)     Hypertension     Sleep apnea     uses CPAP     Past Surgical History:   Procedure Laterality Date    BACK SURGERY      CAROTID ENDARTERECTOMY Right     COLONOSCOPY N/A 3/7/2017    Procedure: COLONOSCOPY TO CECUM AND TERMINAL ILEUM WITH COLD BIOPSY POLYPECTOMIES;  Surgeon: Dago KEMP MD;  Location: [facility] ENDOSCOPY;  Service:     COLONOSCOPY N/A 2021    Procedure: COLONOSCOPY TO CECUM/TI;  Surgeon: Dago Abbott MD;  Location: [facility] ENDOSCOPY;  Service: Gastroenterology;  Laterality: N/A;  pre: HX OF POLYPS  post: HEMORRHOIDS, DIVERTICULOSIS, OTHERWISE NORMAL TO TI    ENDOSCOPY N/A 3/7/2017    Procedure: ESOPHAGOGASTRODUODENOSCOPY WITH BIOPSIES;  Surgeon: Dago KEMP MD;  Location: [facility] ENDOSCOPY;  Service:     ENDOSCOPY N/A 2019    Procedure: ESOPHAGOGASTRODUODENOSCOPY with biopsies;  Surgeon: Dago Abbott MD;  Location: [facility] ENDOSCOPY;  Service: Gastroenterology    ENDOSCOPY N/A 2021    Procedure: ESOPHAGOGASTRODUODENOSCOPY WITH BX'S;  Surgeon: Dago Abbott MD;  Location: [facility] ENDOSCOPY;  Service: Gastroenterology;  Laterality: N/A;   pre: GERD, ALVARADO'S ESOPHAGUS  post: ALVARADO'S ESOPHAGUS, ESOPHAGITIS, HIATAL HERNIA,GASTRITIS    KNEE ARTHROSCOPY Left        SLP Recommendation and Plan  SLP Swallowing Diagnosis: swallow WFL/no suspected pharyngeal impairment (09/25/23 0800)  SLP Diet Recommendation: regular textures, thin liquids (09/25/23 0800)  Recommended Precautions and Strategies: upright posture during/after eating (09/25/23 0800)  SLP Rec. for Method of Medication Administration: meds whole (09/25/23 0800)     Monitor for Signs of Aspiration: notify SLP if any concerns (09/25/23 0800)     Swallow Criteria for Skilled Therapeutic Interventions Met: no problems identified which require skilled intervention (09/25/23 0800)  Anticipated Discharge Disposition (SLP): No further SLP services warranted (09/25/23 1019)     Therapy Frequency (Swallow): evaluation only (09/25/23 0800)                                                        SWALLOW EVALUATION (last 72 hours)       SLP Adult Swallow Evaluation       Row Name 09/25/23 0800                   Rehab Evaluation    Document Type evaluation  -SN        Subjective Information no complaints  -SN        Patient Observations alert;cooperative  -SN        Patient Effort good  -SN        Symptoms Noted During/After Treatment none  -SN           General Information    Current Method of Nutrition regular textures;thin liquids  -SN        Prior Level of Function-Swallowing no diet consistency restrictions  -SN        Plans/Goals Discussed with patient  -SN        Barriers to Rehab none identified  -SN        Patient's Goals for Discharge return home  -SN           Oral Motor Structure and Function    Dentition Assessment natural, present and adequate  -SN        Secretion Management WNL/WFL  -SN        Mucosal Quality moist, healthy  -SN        Gag Response WFL  -SN        Volitional Swallow WFL  -SN        Volitional Cough WFL  -SN           Oral Musculature and Cranial Nerve Assessment    Oral  Motor General Assessment WFL  -SN           General Eating/Swallowing Observations    Respiratory Support Currently in Use room air  -SN        Eating/Swallowing Skills self-fed  -SN        Positioning During Eating upright 90 degree;upright in bed  -SN        Utensils Used spoon;cup;straw  -SN        Consistencies Trialed regular textures;pureed;thin liquids  -SN           Respiratory    Respiratory Status WFL  -SN           Clinical Swallow Eval    Oral Prep Phase WFL  -SN        Oral Transit WFL  -SN        Oral Residue WFL  -SN        Pharyngeal Phase no overt signs/symptoms of pharyngeal impairment  -SN        Esophageal Phase unremarkable  -SN        Clinical Swallow Evaluation Summary Patient is a 73-year-old male admitted [facility] on [facility]/24/2023 secondary to TIA.  MRI revealed infarct in right occipital lobe.  Oral motor examination within normal limits.  No asymmetry, decreased strength range of motion observed.  Patient swallowing consistencies at bedside within a timely manner.  Laryngeal elevation noted to palpation.  No overt signs or symptoms of aspiration observed at bedside however cannot rule out silent aspiration.  -SN           SLP Evaluation Clinical Impression    SLP Swallowing Diagnosis swallow WFL/no suspected pharyngeal impairment  -SN        Swallow Criteria for Skilled Therapeutic Interventions Met no problems identified which require skilled intervention  -SN           Recommendations    Therapy Frequency (Swallow) evaluation only  -SN        SLP Diet Recommendation regular textures;thin liquids  -SN        Recommended Precautions and Strategies upright posture during/after eating  -SN        SLP Rec. for Method of Medication Administration meds whole  -SN        Monitor for Signs of Aspiration notify SLP if any concerns  -SN                  User Key  (r) = Recorded By, (t) = Taken By, (c) = Cosigned By      Initials Name Effective Dates    Yudith Smith MS-CCC/SLP, CNT  03/31/21 -                 Patient demonstrates level 7 of 7 on functional communication measures for swallowing, indicating a 0% limitation in function.    At this time, patient does not require any further skilled speech pathology services regarding dysphagia.  If patient should demonstrate any decline in status please reconsult speech pathology.    EDUCATION  The patient has been educated in the following areas:   Dysphagia (Swallowing Impairment).              Time Calculation:    Time Calculation- SLP       Row Name 09/25/23 1020             Time Calculation- SLP    SLP Start Time 0800  -SN      SLP Stop Time 0900  -SN      SLP Time Calculation (min) 60 min  -SN      SLP Received On 09/25/23  -SN         Untimed Charges    88006-ZU Eval Oral Pharyng Swallow Minutes 60  -SN         Total Minutes    Untimed Charges Total Minutes 60  -SN       Total Minutes 60  -SN                User Key  (r) = Recorded By, (t) = Taken By, (c) = Cosigned By      Initials Name Provider Type    SN Yudith Prescott MS-CCC/SLP, AMILCAR Speech and Language Pathologist                    Therapy Charges for Today       Code Description Service Date Service Provider Modifiers Qty    [id number] HC ST EVAL ORAL PHARYNG SWALLOW 4 9/25/2023 Yudith Prescott MS-CCC/SLP, CNT GN 1                 PINKY Farley/AMILCAR CORONADO  9/25/2023

## 2023-09-25 NOTE — PLAN OF CARE
Goal Outcome Evaluation:  Plan of Care Reviewed With: (P) patient           Outcome Evaluation: (P) Pt presents with no strength or balance deficits and the ability to tolerate functional activity. He does not require skilled PT services at this time as he is fully functional with no safety concerns.      Anticipated Discharge Disposition (PT): (P) home

## 2023-09-25 NOTE — DISCHARGE SUMMARY
[facility]         HOSPITALIST  DISCHARGE SUMMARY    Patient Name: Evan Yoon  : 1950  MRN: [id number]    Date of Admission: 2023  Date of Discharge:  2023    Primary Care Physician: John Fischer MD    Consults       Date and Time Order Name Status Description    2023  1:34 PM Inpatient Neurology Consult Stroke      2023 12:59 PM Hospitalist (on-call MD unless specified)              Active and Resolved Hospital Problems:  Active Hospital Problems    Diagnosis POA    **TIA (transient ischemic attack) [G45.9] Yes    Acute CVA (cerebrovascular accident) [I63.9] Unknown      Resolved Hospital Problems   No resolved problems to display.       Hospital Course     Hospital Course:  Evan Yoon is a 73 y.o. male with history of vascular disease, hypertension, sleep apnea who presented to the ER for left-sided numbness and weakness.  Patient states that he was fine when he woke up this morning around 815 however he had a coughing episode and at the end of the coughing episode he noticed that his left arm was weak so much so that he could not move it and it felt numb.  Patient also noted some left lower extremity numbness with no weakness.  Patient called his wife who was upstairs and she states that he did not appear to have any facial droop.  Patient was actually using his right arm to lift his left arm.  Patient has never had any episode like this before.  The entire episode lasted a couple minutes however when he went to the emergency room he is completely asymptomatic.  Patient was evaluated in the ER and had a CT angiogram of the head which showed no evidence of large vessel occlusion.  Patient had 56% stenosis of the left internal carotid artery as well as 50% stenosis of the right subclavian artery and a densely calcified plaque at the origin of the left vertebral artery with probable high-grade stenosis.  Patient also had a short segment of occlusion of the left  vertebral artery at the C6/C7 level that reconstituted distally.  Patient CT of the head did not show any abnormalities.     Patient was evaluated by tele neurology and it was recommended that he have dual antiplatelet therapy and high-dose statin for stroke risk reduction.  It was not recommended for admission to the hospital for further work-up and evaluation of his symptoms.  Patient did undergo an MRI of his brain which does show a 1.2 cm area of acute infarction in the medial right occipital lobe.  At this time patient is taken continue on dual antiplatelet therapy aspirin and Plavix.  Patient overall continues to feel fine and has no recurrent symptoms.  Patient is anxious to discharge home.  Patient will be discharged home today in stable condition.  He will continue with the prescription of Plavix he is already on aspirin.  He will continue on his high intensity statin.  Patient states that he will follow-up with Dr. Fischer and get a referral to neurology from him.     DISCHARGE Follow Up Recommendations for labs and diagnostics:   Follow up with Dr. Fischer in 1 week  Patient will need referral to Neurology for continued monitoring       Day of Discharge     Vital Signs:  Temp:  [98.1 °F (36.7 °C)-98.6 °F (37 °C)] 98.1 °F (36.7 °C)  Heart Rate:  [63-88] 73  Resp:  [18-20] 18  BP: (107-173)/(73-94) 153/75  Physical Exam:        Constitutional: Awake, alert, no acute distress resting in bed.               Eyes: Pupils equal, sclerae anicteric, no conjunctival injection              HENT: NCAT, mucous membranes moist              Neck: Supple, no thyromegaly, no lymphadenopathy, trachea midline              Respiratory: Clear to auscultation bilaterally, nonlabored respirations               Cardiovascular: RRR, no murmurs, rubs, or gallops, palpable pedal pulses bilaterally              Gastrointestinal: Positive bowel sounds, soft, nontender, nondistended              Musculoskeletal: No bilateral ankle edema, no  clubbing or cyanosis to extremities              Psychiatric: Appropriate affect, cooperative              Neurologic: Oriented x 3, strength symmetric in all extremities, Cranial Nerves grossly intact to confrontation, speech clear              Skin: No rashes       Discharge Details        Discharge Medications        New Medications        Instructions Start Date   clopidogrel 75 MG tablet  Commonly known as: PLAVIX   75 mg, Oral, Daily   Start Date: September 26, 2023            Continue These Medications        Instructions Start Date   amLODIPine 10 MG tablet  Commonly known as: NORVASC   10 mg, Oral, Daily      aspirin 81 MG chewable tablet   81 mg, Oral, Daily      cholecalciferol 25 MCG (1000 UT) tablet  Commonly known as: VITAMIN D3   1,000 Units, Oral, Daily      fish oil 1000 MG capsule capsule   1,000 mg, Oral, Daily      lansoprazole 30 MG capsule  Commonly known as: PREVACID   30 mg, Oral, Daily      moexipril 15 MG tablet  Commonly known as: UNIVASC   15 mg, Oral, Nightly      multivitamin tablet tablet  Commonly known as: THERAGRAN   1 tablet, Oral, Daily      rosuvastatin 40 MG tablet  Commonly known as: CRESTOR   40 mg, Oral, Daily      tadalafil 5 MG tablet  Commonly known as: CIALIS   5 mg, Oral, Daily      Ventolin  (90 Base) MCG/ACT inhaler  Generic drug: albuterol sulfate HFA   2 puffs, Inhalation, Every 4 Hours PRN      VITAMIN B COMPLEX PO   1 tablet, Oral, Daily      vitamin E 400 UNIT capsule   400 Units, Oral, Daily               No Known Allergies    Discharge Disposition:  Home or Self Care    Diet:  Hospital:  Diet Order   Procedures    Diet: Regular/House Diet; Texture: Regular Texture (IDDSI 7); Fluid Consistency: Thin (IDDSI 0)       Discharge Activity: as tolerated       CODE STATUS:  Code Status and Medical Interventions:   Ordered at: 09/24/23 1334     Code Status (Patient has no pulse and is not breathing):    CPR (Attempt to Resuscitate)     Medical Interventions  (Patient has pulse or is breathing):    Full Support         No future appointments.    Additional Instructions for the Follow-ups that You Need to Schedule       Discharge Follow-up with PCP   As directed       Currently Documented PCP:    John Fischer MD    PCP Phone Number:    [phone]     Follow Up Details: in 1 week                Pertinent  and/or Most Recent Results     PROCEDURES:   None     LAB RESULTS:      Lab 09/25/23  0421 09/24/23  1110   WBC 5.39 5.66   HEMOGLOBIN 14.4 15.4   HEMATOCRIT 42.5 44.2   PLATELETS 163 170   NEUTROS ABS  --  3.97   IMMATURE GRANS (ABS)  --  0.01   LYMPHS ABS  --  0.70   MONOS ABS  --  0.78   EOS ABS  --  0.17   MCV 98.8* 98.9*   PROTIME  --  14.0   APTT  --  27.9*         Lab 09/25/23  0421 09/24/23  1110   SODIUM 137 139   POTASSIUM 3.9 4.1   CHLORIDE 99 102   CO2 23.4 23.2   ANION GAP 14.6 13.8   BUN 6* 7*   CREATININE 0.71* 0.73*   EGFR 96.3 95.5   GLUCOSE 121* 104*   CALCIUM 9.2 9.2   HEMOGLOBIN A1C 5.60  --    TSH 1.720  --          Lab 09/24/23  1110   TOTAL PROTEIN 7.3   ALBUMIN 4.4   GLOBULIN 2.9   ALT (SGPT) 31   AST (SGOT) 53*   BILIRUBIN 0.9   ALK PHOS 53         Lab 09/24/23  1110   PROTIME 14.0   INR 1.07         Lab 09/25/23  0421   CHOLESTEROL 128   LDL CHOL 43   HDL CHOL 69*   TRIGLYCERIDES 83             Brief Urine Lab Results       None          Microbiology Results (last 10 days)       ** No results found for the last 240 hours. **            CT Angiogram Neck    Result Date: 9/24/2023     1. 56% stenosis of the proximal left internal carotid artery.  2.  Short segment of occlusion of the left vertebral artery at the C6-C7 level with reconstitution distally.  3. 50% stenosis of the proximal right subclavian artery.  Densely calcified plaque at the origin of the left vertebral artery with probable high-grade stenosis.     MYNOR MCKNIGHT MD       Electronically Signed and Approved By: MYNOR MCKNIGHT MD on 9/24/2023 at 12:56                        Results for orders placed during the hospital encounter of 09/24/23    Adult Transthoracic Echo Complete W/ Cont if Necessary Per Protocol (With Agitated Saline)    Interpretation Summary    Left ventricular systolic function is normal. Calculated left ventricular EF = 55.7%    Estimated right ventricular systolic pressure from tricuspid regurgitation is normal (<35 mmHg).    No obveious wall motion abnormalities      Labs Pending at Discharge:        Time spent on Discharge including face to face service:  more than 35 minutes    Electronically signed by Nash Lozada DO, 09/25/23, 1:14 PM EDT.

## 2023-09-26 LAB
QT INTERVAL: 391 MS
QTC INTERVAL: 447 MS

## 2023-09-26 NOTE — OUTREACH NOTE
Prep Survey      Flowsheet Row Responses   [facility] patient discharged from? [facility]   Is LACE score < 7 ? Yes   Eligibility Readm Mgmt   Discharge diagnosis TIA/CVA   Does the patient have one of the following disease processes/diagnoses(primary or secondary)? Stroke   Does the patient have Home health ordered? No   Is there a DME ordered? No   Comments regarding appointments listed   Medication alerts for this patient plavix   Prep survey completed? Yes            Roxanne MUIR - Registered Nurse

## 2023-10-02 ENCOUNTER — READMISSION MANAGEMENT (OUTPATIENT)
Dept: CALL CENTER | Facility: HOSPITAL | Age: 73
End: 2023-10-02
Payer: MEDICARE

## 2023-10-02 NOTE — OUTREACH NOTE
"Stroke Week 1 Survey      Flowsheet Row Responses   [facility] patient discharged from? [facility]   Does the patient have one of the following disease processes/diagnoses(primary or secondary)? Stroke   Week 1 attempt successful? Yes   Call start time 1358   Call end time 1408   Discharge diagnosis TIA/CVA   Is patient permission given to speak with other caregiver? Yes   List who call center can speak with Marcelina spouse and pt   Person spoke with today (if not patient) and relationship Marcelina spouse and pt   Meds reviewed with patient/caregiver? Yes   Is the patient having any side effects they believe may be caused by any medication additions or changes? No   Does the patient have all medications ordered at discharge? Yes   Is the patient taking all medications as directed (includes completed medication regime)? Yes   Comments regarding appointments Pt states he was seen by his PCP group since discharge.  Pt states he plans on speaking to his PCP office re: any necessary referrals/FU visits that he needs to arrange (neuro/cards/etc.)   Does the patient have a primary care provider?  Yes   Has the patient kept scheduled appointments due by today? Yes  [PCP apt]   Has home health visited the patient within 72 hours of discharge? N/A   Does the patient require any assistance with activities of daily living such as eating, bathing, dressing, walking, etc.? No   Does the patient have any residual symptoms from stroke/TIA? Yes   Residual symptoms comments \"mental process delay\" at times-pt states he has to think a little bit longer before turning down a road, etc.   Does the patient understand the diet ordered at discharge? Yes   Did the patient receive a copy of their discharge instructions? Yes   Nursing interventions Reviewed instructions with patient   What is the patient's perception of their health status since discharge? Returned to baseline/stable   Is the patient/caregiver able to teach back the risk factors " for a stroke? High blood pressure-goal below 120/80, Diabetes, High Cholesterol, Physical inactivity and obesity, History of TIAs   Is the patient/caregiver able to teach back signs and symptoms related to disease process for when to call PCP? Yes   Is the patient/caregiver able to teach back signs and symptoms related to disease process for when to call 911? Yes   If the patient is a current smoker, are they able to teach back resources for cessation? Not a smoker   Is the patient/caregiver able to teach back the hierarchy of who to call/visit for symptoms/problems? PCP, Specialist, Home health nurse, Urgent Care, ED, 911 Yes   Is the patient able to teach back FAST for Stroke? B alance: Watch for sudden loss of balance, E yes: Check for vision loss, F ace: Look for an uneven smile, A rm: Check if one arm is weak, S peech: Listen for slurred speech, T nancy: Call 9-1-1 right away   Week 1 call completed? Yes   Call end time 1408            [place] - Registered Nurse

## 2023-10-03 ENCOUNTER — READMISSION MANAGEMENT (OUTPATIENT)
Dept: CALL CENTER | Facility: HOSPITAL | Age: 73
End: 2023-10-03
Payer: MEDICARE

## 2023-10-03 ENCOUNTER — HOSPITAL ENCOUNTER (INPATIENT)
Facility: HOSPITAL | Age: 73
LOS: 1 days | Discharge: HOME | DRG: 065 | End: 2023-10-05
Attending: EMERGENCY MEDICINE | Admitting: INTERNAL MEDICINE
Payer: MEDICARE

## 2023-10-03 ENCOUNTER — APPOINTMENT (OUTPATIENT)
Dept: CT IMAGING | Facility: HOSPITAL | Age: 73
DRG: 065 | End: 2023-10-03
Payer: MEDICARE

## 2023-10-03 ENCOUNTER — APPOINTMENT (OUTPATIENT)
Dept: GENERAL RADIOLOGY | Facility: HOSPITAL | Age: 73
DRG: 065 | End: 2023-10-03
Payer: MEDICARE

## 2023-10-03 ENCOUNTER — APPOINTMENT (OUTPATIENT)
Dept: MRI IMAGING | Facility: HOSPITAL | Age: 73
DRG: 065 | End: 2023-10-03
Payer: MEDICARE

## 2023-10-03 DIAGNOSIS — Z78.9 DECREASED ACTIVITIES OF DAILY LIVING (ADL): ICD-10-CM

## 2023-10-03 DIAGNOSIS — G45.9 TIA (TRANSIENT ISCHEMIC ATTACK): Primary | ICD-10-CM

## 2023-10-03 DIAGNOSIS — R26.2 DIFFICULTY IN WALKING: ICD-10-CM

## 2023-10-03 PROBLEM — I63.9 STROKE: Status: ACTIVE | Noted: 2023-10-03

## 2023-10-03 LAB
ALBUMIN SERPL-MCNC: 4.4 G/DL (ref 3.5–5.2)
ALBUMIN/GLOB SERPL: 1.5 G/DL
ALP SERPL-CCNC: 54 U/L (ref 39–117)
ALT SERPL W P-5'-P-CCNC: 36 U/L (ref 1–41)
ANION GAP SERPL CALCULATED.3IONS-SCNC: 15.1 MMOL/L (ref 5–15)
AST SERPL-CCNC: 57 U/L (ref 1–40)
BASOPHILS # BLD AUTO: 0.02 10*3/MM3 (ref 0–0.2)
BASOPHILS NFR BLD AUTO: 0.4 % (ref 0–1.5)
BILIRUB SERPL-MCNC: 0.9 MG/DL (ref 0–1.2)
BILIRUB UR QL STRIP: NEGATIVE
BUN SERPL-MCNC: 6 MG/DL (ref 8–23)
BUN/CREAT SERPL: 9.4 (ref 7–25)
CALCIUM SPEC-SCNC: 9.2 MG/DL (ref 8.6–10.5)
CHLORIDE SERPL-SCNC: 100 MMOL/L (ref 98–107)
CLARITY UR: CLEAR
CO2 SERPL-SCNC: 22.9 MMOL/L (ref 22–29)
COLOR UR: YELLOW
CREAT SERPL-MCNC: 0.64 MG/DL (ref 0.76–1.27)
DEPRECATED RDW RBC AUTO: 45 FL (ref 37–54)
EGFRCR SERPLBLD CKD-EPI 2021: 99.3 ML/MIN/1.73
EOSINOPHIL # BLD AUTO: 0.18 10*3/MM3 (ref 0–0.4)
EOSINOPHIL NFR BLD AUTO: 3.7 % (ref 0.3–6.2)
ERYTHROCYTE [DISTWIDTH] IN BLOOD BY AUTOMATED COUNT: 12.5 % (ref 12.3–15.4)
GLOBULIN UR ELPH-MCNC: 2.9 GM/DL
GLUCOSE SERPL-MCNC: 97 MG/DL (ref 65–99)
GLUCOSE UR STRIP-MCNC: NEGATIVE MG/DL
HCT VFR BLD AUTO: 44.7 % (ref 37.5–51)
HGB BLD-MCNC: 15.2 G/DL (ref 13–17.7)
HGB UR QL STRIP.AUTO: NEGATIVE
HOLD SPECIMEN: NORMAL
HOLD SPECIMEN: NORMAL
IMM GRANULOCYTES # BLD AUTO: 0.02 10*3/MM3 (ref 0–0.05)
IMM GRANULOCYTES NFR BLD AUTO: 0.4 % (ref 0–0.5)
KETONES UR QL STRIP: ABNORMAL
LEUKOCYTE ESTERASE UR QL STRIP.AUTO: NEGATIVE
LYMPHOCYTES # BLD AUTO: 0.62 10*3/MM3 (ref 0.7–3.1)
LYMPHOCYTES NFR BLD AUTO: 12.7 % (ref 19.6–45.3)
MAGNESIUM SERPL-MCNC: 1.9 MG/DL (ref 1.6–2.4)
MCH RBC QN AUTO: 33.6 PG (ref 26.6–33)
MCHC RBC AUTO-ENTMCNC: 34 G/DL (ref 31.5–35.7)
MCV RBC AUTO: 98.9 FL (ref 79–97)
MONOCYTES # BLD AUTO: 0.72 10*3/MM3 (ref 0.1–0.9)
MONOCYTES NFR BLD AUTO: 14.8 % (ref 5–12)
NEUTROPHILS NFR BLD AUTO: 3.31 10*3/MM3 (ref 1.7–7)
NEUTROPHILS NFR BLD AUTO: 68 % (ref 42.7–76)
NITRITE UR QL STRIP: NEGATIVE
NRBC BLD AUTO-RTO: 0 /100 WBC (ref 0–0.2)
PH UR STRIP.AUTO: 7 [PH] (ref 5–8)
PLATELET # BLD AUTO: 179 10*3/MM3 (ref 140–450)
PMV BLD AUTO: 8.9 FL (ref 6–12)
POTASSIUM SERPL-SCNC: 4.1 MMOL/L (ref 3.5–5.2)
PROT SERPL-MCNC: 7.3 G/DL (ref 6–8.5)
PROT UR QL STRIP: NEGATIVE
QT INTERVAL: 422 MS
QTC INTERVAL: 433 MS
RBC # BLD AUTO: 4.52 10*6/MM3 (ref 4.14–5.8)
SODIUM SERPL-SCNC: 138 MMOL/L (ref 136–145)
SP GR UR STRIP: 1.01 (ref 1–1.03)
TROPONIN T SERPL HS-MCNC: 11 NG/L
UROBILINOGEN UR QL STRIP: ABNORMAL
WBC NRBC COR # BLD: 4.87 10*3/MM3 (ref 3.4–10.8)
WHOLE BLOOD HOLD COAG: NORMAL
WHOLE BLOOD HOLD SPECIMEN: NORMAL

## 2023-10-03 PROCEDURE — G0378 HOSPITAL OBSERVATION PER HR: HCPCS

## 2023-10-03 PROCEDURE — A9577 INJ MULTIHANCE: HCPCS | Performed by: INTERNAL MEDICINE

## 2023-10-03 PROCEDURE — 93010 ELECTROCARDIOGRAM REPORT: CPT | Performed by: INTERNAL MEDICINE

## 2023-10-03 PROCEDURE — 85025 COMPLETE CBC W/AUTO DIFF WBC: CPT | Performed by: EMERGENCY MEDICINE

## 2023-10-03 PROCEDURE — 83735 ASSAY OF MAGNESIUM: CPT | Performed by: EMERGENCY MEDICINE

## 2023-10-03 PROCEDURE — 99222 1ST HOSP IP/OBS MODERATE 55: CPT | Performed by: STUDENT IN AN ORGANIZED HEALTH CARE EDUCATION/TRAINING PROGRAM

## 2023-10-03 PROCEDURE — 0 GADOBENATE DIMEGLUMINE 529 MG/ML SOLUTION: Performed by: INTERNAL MEDICINE

## 2023-10-03 PROCEDURE — 93005 ELECTROCARDIOGRAM TRACING: CPT | Performed by: EMERGENCY MEDICINE

## 2023-10-03 PROCEDURE — 71045 X-RAY EXAM CHEST 1 VIEW: CPT

## 2023-10-03 PROCEDURE — 70450 CT HEAD/BRAIN W/O DYE: CPT

## 2023-10-03 PROCEDURE — 84484 ASSAY OF TROPONIN QUANT: CPT | Performed by: EMERGENCY MEDICINE

## 2023-10-03 PROCEDURE — 80053 COMPREHEN METABOLIC PANEL: CPT | Performed by: EMERGENCY MEDICINE

## 2023-10-03 PROCEDURE — 70553 MRI BRAIN STEM W/O & W/DYE: CPT

## 2023-10-03 PROCEDURE — 36415 COLL VENOUS BLD VENIPUNCTURE: CPT

## 2023-10-03 PROCEDURE — 81003 URINALYSIS AUTO W/O SCOPE: CPT | Performed by: EMERGENCY MEDICINE

## 2023-10-03 PROCEDURE — 99285 EMERGENCY DEPT VISIT HI MDM: CPT

## 2023-10-03 RX ORDER — ASPIRIN 325 MG
325 TABLET ORAL DAILY
Status: DISCONTINUED | OUTPATIENT
Start: 2023-10-03 | End: 2023-10-04

## 2023-10-03 RX ORDER — SODIUM CHLORIDE 0.9 % (FLUSH) 0.9 %
10 SYRINGE (ML) INJECTION AS NEEDED
Status: DISCONTINUED | OUTPATIENT
Start: 2023-10-03 | End: 2023-10-05 | Stop reason: HOSPADM

## 2023-10-03 RX ORDER — SODIUM CHLORIDE 0.9 % (FLUSH) 0.9 %
10 SYRINGE (ML) INJECTION EVERY 12 HOURS SCHEDULED
Status: DISCONTINUED | OUTPATIENT
Start: 2023-10-03 | End: 2023-10-05 | Stop reason: HOSPADM

## 2023-10-03 RX ORDER — ACETAMINOPHEN 325 MG/1
650 TABLET ORAL EVERY 6 HOURS PRN
Status: DISCONTINUED | OUTPATIENT
Start: 2023-10-03 | End: 2023-10-05 | Stop reason: HOSPADM

## 2023-10-03 RX ORDER — SODIUM CHLORIDE 9 MG/ML
40 INJECTION, SOLUTION INTRAVENOUS AS NEEDED
Status: DISCONTINUED | OUTPATIENT
Start: 2023-10-03 | End: 2023-10-05 | Stop reason: HOSPADM

## 2023-10-03 RX ORDER — CLOPIDOGREL BISULFATE 75 MG/1
75 TABLET ORAL DAILY
Status: DISCONTINUED | OUTPATIENT
Start: 2023-10-03 | End: 2023-10-04

## 2023-10-03 RX ORDER — ATORVASTATIN CALCIUM 40 MG/1
80 TABLET, FILM COATED ORAL NIGHTLY
Status: DISCONTINUED | OUTPATIENT
Start: 2023-10-03 | End: 2023-10-05 | Stop reason: HOSPADM

## 2023-10-03 RX ORDER — ASPIRIN 300 MG/1
300 SUPPOSITORY RECTAL DAILY
Status: DISCONTINUED | OUTPATIENT
Start: 2023-10-03 | End: 2023-10-04

## 2023-10-03 RX ORDER — MONTELUKAST SODIUM 10 MG/1
10 TABLET ORAL NIGHTLY
COMMUNITY
Start: 2023-09-26 | End: 2023-10-03

## 2023-10-03 RX ADMIN — GADOBENATE DIMEGLUMINE 20 ML: 529 INJECTION, SOLUTION INTRAVENOUS at 22:23

## 2023-10-03 RX ADMIN — ASPIRIN 325 MG: 325 TABLET ORAL at 18:26

## 2023-10-03 RX ADMIN — ATORVASTATIN CALCIUM 80 MG: 40 TABLET, FILM COATED ORAL at 20:52

## 2023-10-03 RX ADMIN — ACETAMINOPHEN 650 MG: 325 TABLET ORAL at 20:51

## 2023-10-03 RX ADMIN — Medication 10 ML: at 20:52

## 2023-10-03 RX ADMIN — CLOPIDOGREL BISULFATE 75 MG: 75 TABLET ORAL at 18:26

## 2023-10-03 NOTE — CONSULTS
[facility]  [facility] Tele[facility]urology Consult Services      Patient Name:   Evan Yoon  YOB: 1950  Identification Number:   MRN - [id number]  Date of Service:   10/03/2023 11:44:50    Diagnosis:        G45.9 - Transient cerebral ischemic attack, unspecified    Impression:       72 yo male with history of recent TIA 2-3 weeks ago on ASA/Plavix, HTN, HLD presenting to the ED with transient episode of Right sided numbness/incoordination and vertical diplopia lasting 20-30 minutes before resolving. He has a known Left vertebral artery occlusion. Both episodes provoked in the setting of severe coughing spell. Concern for recurrent TIA affecting the posterior circulation. Plan to admit for MRI Brain wo and stroke work-up. Continue DAPT. Permissive HTN. Consider pulmonology consult.    Our recommendations are outlined below.    Recommendations:          Stroke/Telemetry Floor        Neuro Checks        Bedside Swallow Eval        DVT Prophylaxis        IV Fluids, Normal Saline        Head of Bed 30 Degrees        Euglycemia and Avoid Hyperthermia (PRN Acetaminophen)        Initiate dual antiplatelet therapy with Aspirin 81 mg daily and Clopidogrel 75 mg daily.        Antihypertensives PRN if Blood pressure is greater than 220/120 or there is a concern for End organ damage/contraindications for permissive HTN. If blood pressure is greater than 220/120 give labetalol PO or IV or Vasotec IV with a goal of 15% reduction in BP during the first 24 hours.    Recommended Scan:       MRI Head Without Contrast       Echocardiogram - Transthoracic Echocardiogram       Routine CTA Head and neck    Lipid Panel to Be Obtained, if Not Done in the Last 30 Days    Therapies:        Physical Therapy, Occupational Therapy, Speech Therapy Assessment When Applicable    Dysphagia:        Swallow Evaluation, Bedside        NPO Until Swallow Evaluation    DVT prophylaxis:        Choice of Primary  Team    Disposition:        Neurology Follow Up Recommended    Sign Out:        Discussed with Emergency Department Provider        ------------------------------------------------------------------------------    Advanced Imaging:  Advanced Imaging Deferred because:    Non-disabling symptoms as verified by the patient; no cortical signs so not consistent with LVO      Metrics:  Last Known Well: 10/03/2023 08:30:00  [facility] Notification Time: 10/03/2023 11:43:58  Arrival Time: 10/03/2023 09:57:00  Stamp Time: 10/03/2023 11:44:50  Initial Response Time: 10/03/2023 11:46:25  Symptoms: Right sided numbness/incoordination, vertical double vision.  Initial patient interaction: 10/03/2023 11:49:30  NIHSS Assessment Completed: 10/03/2023 12:00:41  Patient is not a candidate for Thrombolytic.  Thrombolytic Medical Decision: 10/03/2023 12:00:42  Patient was not deemed candidate for Thrombolytic because of following reasons:  Resolved symptoms (no residual disabling symptoms).    CT head showed no acute hemorrhage or acute core infarct.    Primary Provider Notified of Diagnostic Impression and Management Plan on: 10/03/2023 13:04:42        ------------------------------------------------------------------------------    History of Present Illness:  Patient is a 73 year old Male.    Patient was brought by EMS for symptoms of Right sided numbness/incoordination, vertical double vision.  Patient presenting from home. Reports that he was last completely normal at ~0830 this morning when he developed sudden onset of Right sided numbness and incoordination causing him to fall. He also reports having some vertical diplopia as well. Symptoms lasting 20-30 minutes before resolving. He has a history of recent TIA 2-3 weeks ago, but affecting his Left side.       Past Medical History:       Hypertension       Hyperlipidemia    Medications:    No Anticoagulant use   Antiplatelet use: Yes ASA/Plavix  Reviewed EMR for current  medications    Allergies:   Reviewed    Social History:  Smoking: No  Alcohol Use: Yes    Family History:    There is no family history of premature cerebrovascular disease pertinent to this consultation    ROS :  14 Points Review of Systems was performed and was negative except mentioned in HPI.    Past Surgical History:  There Is No Surgical History Contributory To Today’s Visit         Examination:  BP(166/89), Pulse(88),  1A: Level of Consciousness - Alert; keenly responsive + 0  1B: Ask Month and Age - Both Questions Right + 0  1C: Blink Eyes & Squeeze Hands - Performs Both Tasks + 0  2: Test Horizontal Extraocular Movements - Normal + 0  3: Test Visual Fields - No Visual Loss + 0  4: Test Facial Palsy (Use Grimace if Obtunded) - Normal symmetry + 0  5A: Test Left Arm Motor Drift - No Drift for 10 Seconds + 0  5B: Test Right Arm Motor Drift - No Drift for 10 Seconds + 0  6A: Test Left Leg Motor Drift - No Drift for 5 Seconds + 0  6B: Test Right Leg Motor Drift - No Drift for 5 Seconds + 0  7: Test Limb Ataxia (FNF/Heel-Shin) - No Ataxia + 0  8: Test Sensation - Normal; No sensory loss + 0  9: Test Language/Aphasia - Normal; No aphasia + 0  10: Test Dysarthria - Normal + 0  11: Test Extinction/Inattention - No abnormality + 0    NIHSS Score: 0    Pre-Morbid Modified Greg Scale:  0 Points = No symptoms at all    Spoke with : Dr. Sellers  I reviewed the available imaging via [facility] and initiated discussion with the primary provider    Patient/Family was informed the Neurology Consult would occur via TeleHealth consult by way of interactive audio and video telecommunications and consented to receiving care in this manner.      Patient is being evaluated for possible acute neurologic impairment and high probability of imminent or life-threatening deterioration. I spent total of 35 minutes providing care to this patient, including time for face to face visit via telemedicine, review of medical records, imaging studies  and discussion of findings with providers, the patient and/or family.      Dr Keyon Alanis      [facility]  For Inpatient follow-up with [facility] physician please call [facility] [phone]. This is not an outpatient service. Post hospital discharge, please contact hospital directly.

## 2023-10-03 NOTE — H&P
[facility] HISTORY AND PHYSICAL  Date: 10/3/2023   Patient Name: Evan Yoon  : 1950  MRN: [id number]  Primary Care Physician:  John Fischer MD  Date of admission: 10/3/2023    Subjective   Subjective     Chief Complaint:     HPI:    Evan Yoon is a 73 y.o. male past medical history of TIA/CVA in 2023 with no residual deficits, Penaloza's esophagus, GERD, hypertension, obstructive sleep apnea on CPAP who presents with right-sided arm weakness and right leg weakness leading to fall    Patient was admitted to the hospital  to  for what was thought to be a TIA.  MRI showed a 1.2 cm ischemic infarct the occipital lobe.  Patient was discharged home on dual antiplatelet therapy.  He states that this was very similar to his previous presentation except his symptoms are on the opposite side.  States he been up about 30 minutes and that his symptoms started after a coughing fit which is similar to his first presentation.  This time he cannot use his right hand and his right leg became so weak that he fell.  He also states he had double vision.  The right arm and leg weakness almost resolved immediately.  The double vision resolved by the time he got to the ER.    In the emergency department the patient's vital signs are as follows temperature is 98.3, pulse 91, respiratory 16, blood pressure 183/87 and 96% on room air.  Of note heart rate did go to 160 briefly while in the emergency department.  CBC shows no abnormalities.  CMP shows no significant abnormalities with an anion gap of 15.1 with ketones in his urine.  Urinalysis is bland with exception of ketones.  Chest x-ray shows no abnormalities.  CT of the head shows no acute intracranial abnormalities.  Teleneurology was consulted and felt the patient need to be admitted for continued observation due to reoccurring stroke.    All symptoms reviewed abnormalities noted above      Personal History     Past Medical  History:  TIA/CVA in September 2023  Penaloza's esophagus  GERD  Hypertension  Obstructive sleep apnea on CPAP    Past Surgical History:  Back surgery  Carotid endarterectomy  Colonoscopy  Endoscopy    Family History:   Colon cancer    Social History:   Former smoker  3 drinks per week    Home Medications:  B Complex Vitamins, albuterol sulfate HFA, amLODIPine, aspirin, cholecalciferol, clopidogrel, fish oil, lansoprazole, moexipril, multivitamin, rosuvastatin, tadalafil, and vitamin E    Allergies:  No Known Allergies      Objective   Objective     Vitals:   Temp:  [98.3 °F (36.8 °C)] 98.3 °F (36.8 °C)  Heart Rate:  [] 91  Resp:  [16] 16  BP: (160-183)/(87-89) 183/87    Physical Exam    Constitutional: Awake, alert, no acute distress   Eyes: Pupils equal, sclerae anicteric, no conjunctival injection   HENT: NCAT, mucous membranes moist   Neck: Supple, no thyromegaly, no lymphadenopathy, trachea midline   Respiratory: Clear to auscultation bilaterally, nonlabored respirations    Cardiovascular: RRR, no murmurs, rubs, or gallops, palpable pedal pulses bilaterally   Gastrointestinal: Positive bowel sounds, soft, nontender, nondistended   Musculoskeletal: No bilateral ankle edema, no clubbing or cyanosis to extremities   Psychiatric: Appropriate affect, cooperative   Neurologic: Oriented x 3, strength symmetric in all extremities, Cranial Nerves grossly intact to confrontation, speech clear   Skin: No rashes     Result Review    Result Review:  I have personally reviewed the results from the time of this admission to 10/3/2023 12:49 EDT and agree with these findings:  Labs and imaging reviewed      Assessment & Plan   Assessment / Plan     Assessment/Plan:   Acute ischemic stroke  Recent admission for ischemic stroke  Known vertebral artery occlusion  Obesity with BMI of 33  Obstructive sleep apnea CPAP    Plan:  --Admit to hospital service  --Consult teleneurology  --Aspirin and Plavix  -- Repeat MRI  --  Telemetry  --Permissive hypertension  --PT/OT/SLP  --Atorvastatin  -- I think most likely the patient is having vasovagal with coughing episodes its leading to strokelike symptoms due to vertebral artery occlusion versus patient is having paroxysmal atrial fibrillation with small embolic clots to the brain.  -- Patient would benefit from an Holter monitor at discharge        DVT prophylaxis:  SCDs    CODE STATUS:     Full code    Admission Status:  I believe this patient meets admission status.    Electronically signed by Iker Edouard MD, 10/03/23, 12:49 PM EDT.

## 2023-10-03 NOTE — ED PROVIDER NOTES
Time: 2:24 PM EDT  Date of encounter:  10/3/2023  Independent Historian/Clinical History and Information was obtained by:   Patient and Family    History is limited by: N/A    Chief Complaint: Right-sided weakness      History of Present Illness:  Patient is a 73 y.o. year old male who presents to the emergency department for evaluation of right-sided weakness.  Patient reports this morning he woke up and was walking to the kitchen to take his medications when he suddenly developed right-sided weakness face, arm, leg causing him to fall.  Wife at bedside does also state he had slurred speech at that time.  Patient does note he had a similar episode approximately 2 weeks ago on the left side that also resolved.    HPI    Patient Care Team  Primary Care Provider: John Fischer MD    Past Medical History:     No Known Allergies  Past Medical History:   Diagnosis Date    Penaloza esophagus     Ex-smoker     GERD (gastroesophageal reflux disease)     Hypertension     Sleep apnea     uses CPAP     Past Surgical History:   Procedure Laterality Date    BACK SURGERY      CAROTID ENDARTERECTOMY Right     COLONOSCOPY N/A 3/7/2017    Procedure: COLONOSCOPY TO CECUM AND TERMINAL ILEUM WITH COLD BIOPSY POLYPECTOMIES;  Surgeon: Dago KEMP MD;  Location: [facility] ENDOSCOPY;  Service:     COLONOSCOPY N/A 7/22/2021    Procedure: COLONOSCOPY TO CECUM/TI;  Surgeon: Dago Abbott MD;  Location: [facility] ENDOSCOPY;  Service: Gastroenterology;  Laterality: N/A;  pre: HX OF POLYPS  post: HEMORRHOIDS, DIVERTICULOSIS, OTHERWISE NORMAL TO TI    ENDOSCOPY N/A 3/7/2017    Procedure: ESOPHAGOGASTRODUODENOSCOPY WITH BIOPSIES;  Surgeon: Dago KEMP MD;  Location: [facility] ENDOSCOPY;  Service:     ENDOSCOPY N/A 4/23/2019    Procedure: ESOPHAGOGASTRODUODENOSCOPY with biopsies;  Surgeon: Dago Abbott MD;  Location: [facility] ENDOSCOPY;  Service: Gastroenterology    ENDOSCOPY N/A 7/22/2021    Procedure:  ESOPHAGOGASTRODUODENOSCOPY WITH BX'S;  Surgeon: Dago Abbott MD;  Location: [facility] ENDOSCOPY;  Service: Gastroenterology;  Laterality: N/A;  pre: GERD, ALVARADO'S ESOPHAGUS  post: ALVARADO'S ESOPHAGUS, ESOPHAGITIS, HIATAL HERNIA,GASTRITIS    KNEE ARTHROSCOPY Left      Family History   Problem Relation Age of Onset    Colon cancer Maternal Aunt        Home Medications:  Prior to Admission medications    Medication Sig Start Date End Date Taking? Authorizing Provider   amLODIPine (NORVASC) 10 MG tablet Take 1 tablet by mouth Daily. 5/24/21  Yes Constantino Huston MD   aspirin 81 MG chewable tablet Chew 1 tablet Daily.   Yes Constantino Huston MD   B Complex Vitamins (VITAMIN B COMPLEX PO) Take 1 tablet by mouth Daily.   Yes Constantino Huston MD   cholecalciferol (VITAMIN D3) 1000 UNITS tablet Take 1 tablet by mouth Daily.   Yes Constantino Huston MD   clopidogrel (PLAVIX) 75 MG tablet Take 1 tablet by mouth Daily for 30 days. 9/26/23 10/26/23 Yes Nash Lozada,    lansoprazole (PREVACID) 30 MG capsule Take 1 capsule by mouth Daily.   Yes ProviderConstantino MD   moexipril (UNIVASC) 15 MG tablet Take 1 tablet by mouth Every Night.   Yes ProviderConstantino MD   Multiple Vitamin (MULTI VITAMIN DAILY PO) Take 1 tablet by mouth Daily.   Yes Constantino Huston MD   Omega-3 Fatty Acids (fish oil) 1000 MG capsule capsule Take 1 capsule by mouth Daily.   Yes Constantino Huston MD   rosuvastatin (CRESTOR) 40 MG tablet Take 1 tablet by mouth Daily.   Yes Constantino Huston MD   tadalafil (CIALIS) 5 MG tablet Take 1 tablet by mouth Daily. 5/24/21  Yes Constantino Huston MD   Ventolin  (90 Base) MCG/ACT inhaler Inhale 2 puffs Every 4 (Four) Hours As Needed. 5/24/21  Yes Constantino Huston MD   vitamin E 400 UNIT capsule Take 1 capsule by mouth Daily.   Yes Constantino Huston MD   montelukast (SINGULAIR) 10 MG tablet Take 1 tablet by mouth Every Night. 9/26/23 10/3/23 Yes  "Provider, Historical, MD        Social History:   Social History     Tobacco Use    Smoking status: Former     Types: Cigarettes     Quit date:      Years since quittin.7    Smokeless tobacco: Never   Vaping Use    Vaping Use: Never used   Substance Use Topics    Alcohol use: Yes     Alcohol/week: 3.0 standard drinks     Types: 3 Cans of beer per week     Comment: social     Drug use: Never         Review of Systems:  Review of Systems   Constitutional:  Negative for chills and fever.   HENT:  Negative for congestion, rhinorrhea and sore throat.    Eyes:  Negative for pain and visual disturbance.   Respiratory:  Negative for apnea, cough, chest tightness and shortness of breath.    Cardiovascular:  Negative for chest pain and palpitations.   Gastrointestinal:  Negative for abdominal pain, diarrhea, nausea and vomiting.   Genitourinary:  Negative for difficulty urinating and dysuria.   Musculoskeletal:  Negative for joint swelling and myalgias.   Skin:  Negative for color change.   Neurological:  Negative for seizures and headaches.   Psychiatric/Behavioral: Negative.     All other systems reviewed and are negative.     Physical Exam:  BP (!) 183/87   Pulse 91   Temp 98.3 °F (36.8 °C) (Oral)   Resp 16   Ht 180.3 cm (71\")   Wt 108 kg (237 lb 3.4 oz)   SpO2 94%   BMI 33.08 kg/m²     Physical Exam  Vitals and nursing note reviewed.   Constitutional:       General: He is not in acute distress.     Appearance: Normal appearance. He is not toxic-appearing.   HENT:      Head: Normocephalic and atraumatic.      Jaw: There is normal jaw occlusion.   Eyes:      General: Lids are normal.      Extraocular Movements: Extraocular movements intact.      Conjunctiva/sclera: Conjunctivae normal.      Pupils: Pupils are equal, round, and reactive to light.   Cardiovascular:      Rate and Rhythm: Normal rate and regular rhythm.      Pulses: Normal pulses.      Heart sounds: Normal heart sounds.   Pulmonary:      Effort: " Pulmonary effort is normal. No respiratory distress.      Breath sounds: Normal breath sounds. No wheezing or rhonchi.   Abdominal:      General: Abdomen is flat.      Palpations: Abdomen is soft.      Tenderness: There is no abdominal tenderness. There is no guarding or rebound.   Musculoskeletal:         General: Normal range of motion.      Cervical back: Normal range of motion and neck supple.      Right lower leg: No edema.      Left lower leg: No edema.   Skin:     General: Skin is warm and dry.   Neurological:      Mental Status: He is alert and oriented to person, place, and time. Mental status is at baseline.   Psychiatric:         Mood and Affect: Mood normal.                Procedures:  Procedures      Medical Decision Making:      Comorbidities that affect care:    Hypertension    External Notes reviewed:    Hospital Discharge Summary: After admission for TIA      The following orders were placed and all results were independently analyzed by me:  Orders Placed This Encounter   Procedures    XR Chest 1 View    CT Head Without Contrast    MRI Brain With & Without Contrast    [place] Draw    Comprehensive Metabolic Panel    Single High Sensitivity Troponin T    Magnesium    Urinalysis With Microscopic If Indicated (No Culture) - Urine, Clean Catch    CBC Auto Differential    NPO Diet NPO Type: Strict NPO    Undress & Gown    Continuous Pulse Oximetry    Vital Signs    Orthostatic Blood Pressure    Inpatient Hospitalist Consult    Oxygen Therapy- Nasal Cannula; Titrate 1-6 LPM Per SpO2; 90 - 95%    POC Glucose Once    ECG 12 Lead ED Triage Standing Order; Weak / Dizzy / AMS    Insert Peripheral IV    Initiate Observation Status    Fall Precautions    CBC & Differential    Green Top (Gel)    Lavender Top    Gold Top - SST    Light Blue Top       Medications Given in the Emergency Department:  Medications   sodium chloride 0.9 % flush 10 mL (has no administration in time range)        ED Course:          Labs:    Lab Results (last 24 hours)       Procedure Component Value Units Date/Time    CBC & Differential [[id number]]  (Abnormal) Collected: 10/03/23 1032    Specimen: Blood from Arm, Right Updated: 10/03/23 1041    Narrative:      The following orders were created for panel order CBC & Differential.  Procedure                               Abnormality         Status                     ---------                               -----------         ------                     CBC Auto Differential[[id number]]        Abnormal            Final result                 Please view results for these tests on the individual orders.    Comprehensive Metabolic Panel [[id number]]  (Abnormal) Collected: 10/03/23 1032    Specimen: Blood from Arm, Right Updated: 10/03/23 1058     Glucose 97 mg/dL      BUN 6 mg/dL      Creatinine 0.64 mg/dL      Sodium 138 mmol/L      Potassium 4.1 mmol/L      Chloride 100 mmol/L      CO2 22.9 mmol/L      Calcium 9.2 mg/dL      Total Protein 7.3 g/dL      Albumin 4.4 g/dL      ALT (SGPT) 36 U/L      AST (SGOT) 57 U/L      Alkaline Phosphatase 54 U/L      Total Bilirubin 0.9 mg/dL      Globulin 2.9 gm/dL      A/G Ratio 1.5 g/dL      BUN/Creatinine Ratio 9.4     Anion Gap 15.1 mmol/L      eGFR 99.3 mL/min/1.73     Narrative:      GFR Normal >60  Chronic Kidney Disease <60  Kidney Failure <15    The GFR formula is only valid for adults with stable renal function between ages 18 and 70.    Single High Sensitivity Troponin T [[id number]]  (Normal) Collected: 10/03/23 1032    Specimen: Blood from Arm, Right Updated: 10/03/23 1058     HS Troponin T 11 ng/L     Narrative:      High Sensitive Troponin T Reference Range:  <10.0 ng/L- Negative Female for AMI  <15.0 ng/L- Negative Male for AMI  >=10 - Abnormal Female indicating possible myocardial injury.  >=15 - Abnormal Male indicating possible myocardial injury.   Clinicians would have to utilize clinical acumen, EKG, Troponin, and serial changes to  determine if it is an Acute Myocardial Infarction or myocardial injury due to an underlying chronic condition.         Magnesium [[id number]]  (Normal) Collected: 10/03/23 1032    Specimen: Blood from Arm, Right Updated: 10/03/23 1058     Magnesium 1.9 mg/dL     Urinalysis With Microscopic If Indicated (No Culture) - Urine, Clean Catch [[id number]]  (Abnormal) Collected: 10/03/23 1032    Specimen: Urine, Clean Catch Updated: 10/03/23 1043     Color, UA Yellow     Appearance, UA Clear     pH, UA 7.0     Specific Gravity, UA 1.011     Glucose, UA Negative     Ketones, UA 15 mg/dL (1+)     Bilirubin, UA Negative     Blood, UA Negative     Protein, UA Negative     Leuk Esterase, UA Negative     Nitrite, UA Negative     Urobilinogen, UA 1.0 E.U./dL    Narrative:      Urine microscopic not indicated.    CBC Auto Differential [[id number]]  (Abnormal) Collected: 10/03/23 1032    Specimen: Blood from Arm, Right Updated: 10/03/23 1041     WBC 4.87 10*3/mm3      RBC 4.52 10*6/mm3      Hemoglobin 15.2 g/dL      Hematocrit 44.7 %      MCV 98.9 fL      MCH 33.6 pg      MCHC 34.0 g/dL      RDW 12.5 %      RDW-SD 45.0 fl      MPV 8.9 fL      Platelets 179 10*3/mm3      Neutrophil % 68.0 %      Lymphocyte % 12.7 %      Monocyte % 14.8 %      Eosinophil % 3.7 %      Basophil % 0.4 %      Immature Grans % 0.4 %      Neutrophils, Absolute 3.31 10*3/mm3      Lymphocytes, Absolute 0.62 10*3/mm3      Monocytes, Absolute 0.72 10*3/mm3      Eosinophils, Absolute 0.18 10*3/mm3      Basophils, Absolute 0.02 10*3/mm3      Immature Grans, Absolute 0.02 10*3/mm3      nRBC 0.0 /100 WBC              Imaging:    CT Head Without Contrast    Result Date: 10/3/2023  PROCEDURE: CT HEAD WO CONTRAST  COMPARISON:  09/24/2023.  INDICATIONS: Neuro deficit, persistent/recurrent, CNS neoplasm suspected  PROTOCOL:   Standard imaging protocol performed    RADIATION:   DLP: 1081.2mGy*cm   MA and/or KV was adjusted to minimize radiation dose.     TECHNIQUE: After  obtaining the patient's consent, CT images were obtained without non-ionic intravenous contrast material.  FINDINGS:  Brain:  No acute intracranial hemorrhage or mass effect noted.  Mild degree of diffuse atrophy is appreciated.  Mild white matter changes compatible small-vessel ischemic disease in this age group noted.  Orbits:  Orbits are grossly unremarkable and periorbital soft tissues appear grossly unremarkable.  Sinuses:  Paranasal sinuses are clear.  Mastoid air cells are clear.  Calvarium and soft tissues:  Bony calvarium is intact.  Soft tissues are grossly unremarkable in appearance.         1. White matter changes compatible small-vessel ischemic disease and prior small infarcts 2. No acute intracranial abnormality noted 3. Mild diffuse atrophy     ANTWON RUSHING MD       Electronically Signed and Approved By: ANTWON RUSHING MD on 10/03/2023 at 11:08             XR Chest 1 View    Result Date: 10/3/2023  PROCEDURE: XR CHEST 1 VW  COMPARISON: None  INDICATIONS: Weak/Dizzy/AMS triage protocol/generalized weakness, cough  FINDINGS:   The lungs are well-expanded. The heart and pulmonary vasculature are within normal limits. No pleural effusions are identified. There are no active appearing infiltrates.  IMPRESSION: No active disease.  MYNOR MCKNIGHT MD       Electronically Signed and Approved By: MYNOR MCKNIGHT MD on 10/03/2023 at 11:36                Differential Diagnosis and Discussion:    Stroke: Differential diagnosis in this patient with signs of possible ischemic stroke include TIA or ischemic stroke, hemorrhagic stroke, hypoglycemic episode, toxic or metabolic encephalopathy, paresthesias.    All labs were reviewed and interpreted by me.  All X-rays impressions were independently interpreted by me.  CT scan radiology impression was interpreted by me.    MDM  Number of Diagnoses or Management Options  TIA (transient ischemic attack)  Diagnosis management comments: In summary this is a  73-year-old male patient presents emerged department for evaluation of sudden onset right-sided weakness today.  Symptoms have resolved prior to arrival.  CT brain without contrast unremarkable.  CBC independently reviewed by me and shows no critical abnormalities.  CMP independently reviewed by me and shows no critical abnormalities.  Teleneurology has been consulted who is seen and evaluate the patient and recommended further evaluation and admission to the hospital.  Patient case has been discussed with the hospitalist team who will admit to the hospital for further evaluation and continuation of treatment.             Patient Care Considerations:    MRI: I considered ordering an MRI however this can be accomplished inpatient      Consultants/Shared Management Plan:    Hospitalist: I have discussed the case with Dr. Edouard who agrees to accept the patient for admission.  Consultant: I have discussed the case with teleneurology who agrees to consult on the patient.    Social Determinants of Health:    Patient is independent, reliable, and has access to care.       Disposition and Care Coordination:    Admit:   Through independent evaluation of the patient's history, physical, and imperical data, the patient meets criteria for observation/admission to the hospital.        Final diagnoses:   TIA (transient ischemic attack)        ED Disposition       ED Disposition   Decision to Admit    Condition   --    Comment   Level of Care: Telemetry [5]   Diagnosis: Stroke [[id number]]                 This medical record created using voice recognition software.             Frederick Sellers MD  10/03/23 [id number]

## 2023-10-04 ENCOUNTER — APPOINTMENT (OUTPATIENT)
Dept: MRI IMAGING | Facility: HOSPITAL | Age: 73
DRG: 065 | End: 2023-10-04
Payer: MEDICARE

## 2023-10-04 ENCOUNTER — APPOINTMENT (OUTPATIENT)
Dept: CARDIOLOGY | Facility: HOSPITAL | Age: 73
DRG: 065 | End: 2023-10-04
Payer: MEDICARE

## 2023-10-04 ENCOUNTER — APPOINTMENT (OUTPATIENT)
Dept: CT IMAGING | Facility: HOSPITAL | Age: 73
DRG: 065 | End: 2023-10-04
Payer: MEDICARE

## 2023-10-04 LAB
CHOLEST SERPL-MCNC: 123 MG/DL (ref 0–200)
GLUCOSE BLDC GLUCOMTR-MCNC: 92 MG/DL (ref 70–99)
HBA1C MFR BLD: 5.6 % (ref 4.8–5.6)
HDLC SERPL-MCNC: 66 MG/DL (ref 40–60)
LDLC SERPL CALC-MCNC: 40 MG/DL (ref 0–100)
LDLC/HDLC SERPL: 0.6 {RATIO}
TRIGL SERPL-MCNC: 87 MG/DL (ref 0–150)
VLDLC SERPL-MCNC: 17 MG/DL (ref 5–40)

## 2023-10-04 PROCEDURE — 93320 DOPPLER ECHO COMPLETE: CPT

## 2023-10-04 PROCEDURE — 93325 DOPPLER ECHO COLOR FLOW MAPG: CPT | Performed by: SPECIALIST

## 2023-10-04 PROCEDURE — 82948 REAGENT STRIP/BLOOD GLUCOSE: CPT

## 2023-10-04 PROCEDURE — 70551 MRI BRAIN STEM W/O DYE: CPT

## 2023-10-04 PROCEDURE — 70450 CT HEAD/BRAIN W/O DYE: CPT

## 2023-10-04 PROCEDURE — 97165 OT EVAL LOW COMPLEX 30 MIN: CPT

## 2023-10-04 PROCEDURE — 93325 DOPPLER ECHO COLOR FLOW MAPG: CPT

## 2023-10-04 PROCEDURE — 70498 CT ANGIOGRAPHY NECK: CPT

## 2023-10-04 PROCEDURE — 80061 LIPID PANEL: CPT | Performed by: INTERNAL MEDICINE

## 2023-10-04 PROCEDURE — 99232 SBSQ HOSP IP/OBS MODERATE 35: CPT | Performed by: GENERAL ACUTE CARE HOSPITAL

## 2023-10-04 PROCEDURE — 94799 UNLISTED PULMONARY SVC/PX: CPT

## 2023-10-04 PROCEDURE — 93312 ECHO TRANSESOPHAGEAL: CPT | Performed by: SPECIALIST

## 2023-10-04 PROCEDURE — 93312 ECHO TRANSESOPHAGEAL: CPT

## 2023-10-04 PROCEDURE — 99221 1ST HOSP IP/OBS SF/LOW 40: CPT | Performed by: SPECIALIST

## 2023-10-04 PROCEDURE — 25510000001 IOPAMIDOL PER 1 ML: Performed by: INTERNAL MEDICINE

## 2023-10-04 PROCEDURE — 25010000002 MIDAZOLAM PER 1MG: Performed by: SPECIALIST

## 2023-10-04 PROCEDURE — 97161 PT EVAL LOW COMPLEX 20 MIN: CPT

## 2023-10-04 PROCEDURE — 83036 HEMOGLOBIN GLYCOSYLATED A1C: CPT | Performed by: INTERNAL MEDICINE

## 2023-10-04 PROCEDURE — 0 MEPERIDINE PER 100 MG: Performed by: SPECIALIST

## 2023-10-04 PROCEDURE — 70496 CT ANGIOGRAPHY HEAD: CPT

## 2023-10-04 RX ORDER — ASPIRIN 81 MG/1
81 TABLET ORAL DAILY
Status: DISCONTINUED | OUTPATIENT
Start: 2023-10-04 | End: 2023-10-04

## 2023-10-04 RX ORDER — MEPERIDINE HYDROCHLORIDE 25 MG/ML
INJECTION INTRAMUSCULAR; INTRAVENOUS; SUBCUTANEOUS
Status: COMPLETED | OUTPATIENT
Start: 2023-10-04 | End: 2023-10-04

## 2023-10-04 RX ORDER — MELATONIN
1000 DAILY
Status: DISCONTINUED | OUTPATIENT
Start: 2023-10-04 | End: 2023-10-05 | Stop reason: HOSPADM

## 2023-10-04 RX ORDER — CLOPIDOGREL BISULFATE 75 MG/1
75 TABLET ORAL DAILY
Status: DISCONTINUED | OUTPATIENT
Start: 2023-10-04 | End: 2023-10-05 | Stop reason: HOSPADM

## 2023-10-04 RX ORDER — PANTOPRAZOLE SODIUM 40 MG/1
40 TABLET, DELAYED RELEASE ORAL
Status: DISCONTINUED | OUTPATIENT
Start: 2023-10-04 | End: 2023-10-05 | Stop reason: HOSPADM

## 2023-10-04 RX ORDER — MIDAZOLAM HYDROCHLORIDE 2 MG/2ML
INJECTION, SOLUTION INTRAMUSCULAR; INTRAVENOUS
Status: COMPLETED | OUTPATIENT
Start: 2023-10-04 | End: 2023-10-04

## 2023-10-04 RX ORDER — ASPIRIN 81 MG/1
81 TABLET, CHEWABLE ORAL DAILY
Status: DISCONTINUED | OUTPATIENT
Start: 2023-10-04 | End: 2023-10-05 | Stop reason: HOSPADM

## 2023-10-04 RX ADMIN — TOPICAL ANESTHETIC 1 SPRAY: 200 SPRAY DENTAL; PERIODONTAL at 10:57

## 2023-10-04 RX ADMIN — ATORVASTATIN CALCIUM 80 MG: 40 TABLET, FILM COATED ORAL at 21:28

## 2023-10-04 RX ADMIN — Medication 10 ML: at 21:29

## 2023-10-04 RX ADMIN — ACETAMINOPHEN 650 MG: 325 TABLET ORAL at 02:28

## 2023-10-04 RX ADMIN — MIDAZOLAM HYDROCHLORIDE 4 MG: 1 INJECTION, SOLUTION INTRAMUSCULAR; INTRAVENOUS at 10:57

## 2023-10-04 RX ADMIN — MEPERIDINE HYDROCHLORIDE 25 MG: 25 INJECTION INTRAMUSCULAR; INTRAVENOUS; SUBCUTANEOUS at 11:00

## 2023-10-04 RX ADMIN — IOPAMIDOL 100 ML: 755 INJECTION, SOLUTION INTRAVENOUS at 12:45

## 2023-10-04 RX ADMIN — ACETAMINOPHEN 650 MG: 325 TABLET ORAL at 21:28

## 2023-10-04 NOTE — OUTREACH NOTE
Stroke Week 2 Survey      Flowsheet Row Responses   [facility] facility patient discharged from? [facility]   Does the patient have one of the following disease processes/diagnoses(primary or secondary)? Stroke   Week 2 attempt successful? No   Unsuccessful attempts Attempt 1   Revoke Readmitted            SATNAM MAYA - Registered Nurse

## 2023-10-04 NOTE — PROGRESS NOTES
[facility]   Hospitalist Progress Note  Date: 10/4/2023  Patient Name: Evan Yoon  : 1950  MRN: [id number]  Date of admission: 10/3/2023  Consultants:   -Neurology    Subjective   Subjective     Chief Complaint: Right-sided arm weakness and right leg weakness leading to fall    Summary:   Evan Yoon is a 73 y.o. male with history of recent acute CVA involving medial right occipital lobe with no residual deficits (2023), Penaloza's esophagus, GERD, essential hypertension and obstructive sleep apnea on CPAP therapy who presented with right-sided arm weakness and right leg weakness that led to patient having a fall.  Eval in ED significant for patient being hypertensive as well as tachycardic.  CT head with no acute intracranial abnormalities.  Hospitalist service contacted for further evaluation and management.  Teleneurology consulted.  Repeat MRI imaging was obtained and patient found to have new acute infarct involving left occipital-parietal region.    Interval Followup:   Overnight patient had complaints of new left-sided weakness and numbness (which are symptoms he had at presentation of last admission).  Stat CT head was obtained and no acute abnormalities were noted but did show evolving bilateral occipital-parietal cortical infarcts.  Patient hypertensive.  He denies any chest pain or shortness of breath this morning.  Nursing with no additional acute issues to report.    Review of Systems   All systems reviewed and negative unless stated otherwise under subjective.    Objective   Objective     Vitals:   Temp:  [98.3 °F (36.8 °C)-98.6 °F (37 °C)] 98.6 °F (37 °C)  Heart Rate:  [] 74  Resp:  [16-18] 18  BP: (152-184)/(70-93) 161/82  Physical Exam   Gen: No acute distress, Conversant, Pleasant, lying in bed  Resp: CTAB, No w/r/r, No respiratory distress appreciated  Card: RRR, No m/r/g  Abd: Soft, Nontender, Nondistended, + bowel sounds    Result Review    Result Review:  I have  personally reviewed the results as below and agree with these findings:  []  Laboratory:   CMP          9/24/2023    11:10 9/25/2023    04:21 10/3/2023    10:32   CMP   Glucose 104  121  97    BUN 7  6  6    Creatinine 0.73  0.71  0.64    EGFR 95.5  96.3  99.3    Sodium 139  137  138    Potassium 4.1  3.9  4.1    Chloride 102  99  100    Calcium 9.2  9.2  9.2    Total Protein 7.3   7.3    Albumin 4.4   4.4    Globulin 2.9   2.9    Total Bilirubin 0.9   0.9    Alkaline Phosphatase 53   54    AST (SGOT) 53   57    ALT (SGPT) 31   36    Albumin/Globulin Ratio 1.5   1.5    BUN/Creatinine Ratio 9.6  8.5  9.4    Anion Gap 13.8  14.6  15.1      CBC          9/24/2023    11:10 9/25/2023    04:21 10/3/2023    10:32   CBC   WBC 5.66  5.39  4.87    RBC 4.47  4.30  4.52    Hemoglobin 15.4  14.4  15.2    Hematocrit 44.2  42.5  44.7    MCV 98.9  98.8  98.9    MCH 34.5  33.5  33.6    MCHC 34.8  33.9  34.0    RDW 12.8  12.7  12.5    Platelets 170  163  179    LDL: 40.  []  Microbiology:   [x]  Radiology: CT head imaging personally reviewed.  No acute abnormalities were noted.  Involving bilateral occipital-parietal cortical infarcts were appreciated.  [x]  EKG/Telemetry:    []  Cardiology/Vascular:    []  Pathology:  []  Old records:  []  Other:    Assessment & Plan   Assessment / Plan     Assessment:  Acute infarct involving left occipital-parietal region  Nonhemorrhagic subacute right occipital-parietal infarct  Left vertebral artery stenosis  Essential hypertension  Obstructive sleep apnea on CPAP  Obesity (BMI: 33.08)    Plan:  -Neurology consulted and following, appreciate assistance and recommendations in the care of this patient.  -Repeat MRI brain ordered given fluctuation in patient's symptoms overnight.  Follow-up results.  -CTA head and neck ordered  -Given recurrent strokes, cardiology consulted for evaluation of transesophageal echocardiogram  -Continue aspirin, atorvastatin and Plavix  -Allow for permissive  hypertension  -Patient may use home CPAP at night  -PT/OT consulted  -Will plan for patient be set up with outpatient Holter monitor at time of discharge  -Will monitor electrolytes and renal function with BMP and magnesium level in the AM  -Will monitor WBC and Hgb with CBC in the AM  -Clinical course will dictate further management     DVT Prophylaxis: SCDs  GI Prophylaxis: Pantoprazole  Diet: Strict NPO pending FE  Dispo: PT/OT consulted  Code Status: Full code     Time spent personally reviewing patient's chart, labs and imaging, evaluating/examining the patient, discussing care plan with patient and nurse at bedside and discussing management with consultants (Neurology and Cardiology): 51 minutes.     Part of this note may be an electronic transcription/translation of spoken language to printed text using the Dragon dictation system.    DVT prophylaxis:  Mechanical DVT prophylaxis orders are present.    CODE STATUS:   Code Status (Patient has no pulse and is not breathing): CPR (Attempt to Resuscitate)  Medical Interventions (Patient has pulse or is breathing): Full Support        Electronically signed by Mk Mejia MD, 10/04/23, 9:48 AM EDT.

## 2023-10-04 NOTE — PLAN OF CARE
Goal Outcome Evaluation:  Plan of Care Reviewed With: patient        Progress: no change  Outcome Evaluation: Patient presents at or near baseline functional status at time of evaluation with no identified functional deficits that impede patient independence with activities of daily living.  No indicated need for skilled occupational therapy intervention in the acute care setting.  Occupational therapy will sign off at this time.      Anticipated Discharge Disposition (OT): home

## 2023-10-04 NOTE — CONSULTS
[facility]  [facility]      Patient Name:   Evan Yoon  YOB: 1950  Identification Number:   MRN - [id number]  Date of Service:   10/04/2023 01:56:51    Diagnosis:        R53.1 - Weakness    Impression:        Fluctuating weakness and numbness, now resolved. Patient has known left vertebral stenosis which is likely responsible for the last two strokes. Continue best medical management with dual antiplatelets, high intensity starting and slight permissive hypertension. Avoid hypoperfusion or hypovolemia. Repeat brain MRI tomorrow morning.       Our recommendations are outlined below.    Recommendations:          Stroke/Telemetry Floor        Neuro Checks        Bedside Swallow Eval        DVT Prophylaxis        IV Fluids, Normal Saline        Euglycemia and Avoid Hyperthermia (PRN Acetaminophen)        Initiate dual antiplatelet therapy with Aspirin 81 mg daily and Clopidogrel 75 mg daily.    Recommended Scan:       MRI Head Without Contrast    Lipid Panel to Be Obtained, if Not Done in the Last 30 Days    Therapies:        Physical Therapy, Occupational Therapy, Speech Therapy Assessment When Applicable    Dysphagia:        Swallow Evaluation, Bedside        NPO Until Swallow Evaluation    DVT prophylaxis:        Choice of Primary Team    Disposition:        Neurology Follow Up Recommended        ------------------------------------------------------------------------------    Advanced Imaging:  Advanced Imaging Deferred because:    Non-disabling symptoms as verified by the patient; no cortical signs so not consistent with LVO      Metrics:  Last Known Well: 10/04/2023 01:30:00  [facility] Notification Time: 10/04/2023 01:55:25  Stamp Time: 10/04/2023 01:56:51  Initial Response Time: 10/04/2023 02:03:28  Symptoms: left sided weakness and numbness.  Initial patient interaction: 10/04/2023 02:11:18  NIHSS Assessment Completed: 10/04/2023  02:13:30  Patient is not a candidate for Thrombolytic.  Thrombolytic Medical Decision: 10/04/2023 02:13:33  Patient was not deemed candidate for Thrombolytic because of following reasons:  Stroke in last 3 months.    I personally Reviewed the CT Head and it Showed no hemorrhage.    Primary Provider Notified of Diagnostic Impression and Management Plan on: 10/04/2023 02:25:56  Spoke With: RN at bedside  Able to Reach  10/04/2023 02:25:56        ------------------------------------------------------------------------------    History of Present Illness:  Patient is a 73 year old Male.    Inpatient stroke alert was called for symptoms of left sided weakness and numbness.  73 years old man with PMHx of HTN, JULIOCESAR and recent stroke Rt occipital stroke (9/24, presented with Lt sided weakness/numbness in the setting of coughing spell) presenting for evaluation of acute onset left sided weakness and numbness. Last known well time is 150 AM today. Patient presented to the hospital on 10/3 due to concern of right sided weakness and vertical diplopia. On 9/24 presented with left sided weakness and numbness.  CTA (9/24): 56% stenosis of the left internal carotid artery. 50% stenosis of the right subclavian artery and a densely calcified plaque at the origin of the left vertebral artery with probable high-grade stenosis. Patient also had a short segment of occlusion of the left vertebral artery at the C6/C7 level that reconstituted distally.  Brain MRI (10/3): new acute infarct involving the left occipito-parietal region with minimal petechial hemorrhage. There is an evolving non hemorrhagic subacute right occipito-parietal infarct.        Past Medical History:       Hypertension       Stroke    Medications:    No Anticoagulant use   Antiplatelet use: Yes ASA/Plavix  Reviewed EMR for current medications    Allergies:   Reviewed    Social History:  Smoking: Former  Alcohol Use: No  Drug Use: No    Family History:    There Is Family  History Of:non contributory  There is no family history of premature cerebrovascular disease pertinent to this consultation    ROS :  14 Points Review of Systems was performed and was negative except mentioned in HPI.    Past Surgical History:  There Is No Surgical History Contributory To Today’s Visit        Examination:  BP(166/77), Pulse(80),  1A: Level of Consciousness - Alert; keenly responsive + 0  1B: Ask Month and Age - Both Questions Right + 0  1C: Blink Eyes & Squeeze Hands - Performs Both Tasks + 0  2: Test Horizontal Extraocular Movements - Normal + 0  3: Test Visual Fields - No Visual Loss + 0  4: Test Facial Palsy (Use Grimace if Obtunded) - Normal symmetry + 0  5A: Test Left Arm Motor Drift - No Drift for 10 Seconds + 0  5B: Test Right Arm Motor Drift - No Drift for 10 Seconds + 0  6A: Test Left Leg Motor Drift - No Drift for 5 Seconds + 0  6B: Test Right Leg Motor Drift - No Drift for 5 Seconds + 0  7: Test Limb Ataxia (FNF/Heel-Shin) - No Ataxia + 0  8: Test Sensation - Normal; No sensory loss + 0  9: Test Language/Aphasia - Normal; No aphasia + 0  10: Test Dysarthria - Normal + 0  11: Test Extinction/Inattention - No abnormality + 0    NIHSS Score: 0    Pre-Morbid Modified [place] Scale:  1 Points = No significant disability despite symptoms; able to carry out all usual duties and activities    Spoke with : RN at bedside    Patient/Family was informed the Neurology Consult would occur via TeleHealth consult by way of interactive audio and video telecommunications and consented to receiving care in this manner.      Patient is being evaluated for possible acute neurologic impairment and high probability of imminent or life-threatening deterioration. I spent total of 35 minutes providing care to this patient, including time for face to face visit via telemedicine, review of medical records, imaging studies and discussion of findings with providers, the patient and/or family.      Dr Caputo  Then      [facility]  For Inpatient follow-up with [facility] physician please call [facility] [phone]. This is not an outpatient service. Post hospital discharge, please contact hospital directly.

## 2023-10-04 NOTE — THERAPY EVALUATION
Patient Name: Evan Yoon  : 1950    MRN: [id number]                              Today's Date: 10/4/2023       Admit Date: 10/3/2023    Visit Dx:     ICD-10-CM ICD-9-CM   1. TIA (transient ischemic attack)  G45.9 435.9   2. Difficulty in walking  R26.2 719.7   3. Decreased activities of daily living (ADL)  Z78.9 V49.89     Patient Active Problem List   Diagnosis    Penaloza's esophagus without dysplasia    Gastroesophageal reflux disease    Adenomatous polyp of colon    TIA (transient ischemic attack)    Asymptomatic stenosis of left carotid artery    Benign essential hypertension    History of right-sided carotid endarterectomy    Hyperlipidemia    Stenosis of right subclavian artery    Acute CVA (cerebrovascular accident)    Stroke     Past Medical History:   Diagnosis Date    Penaloza esophagus     Ex-smoker     GERD (gastroesophageal reflux disease)     Hypertension     Sleep apnea     uses CPAP     Past Surgical History:   Procedure Laterality Date    BACK SURGERY      CAROTID ENDARTERECTOMY Right     COLONOSCOPY N/A 3/7/2017    Procedure: COLONOSCOPY TO CECUM AND TERMINAL ILEUM WITH COLD BIOPSY POLYPECTOMIES;  Surgeon: Dago KEMP MD;  Location: [facility] ENDOSCOPY;  Service:     COLONOSCOPY N/A 2021    Procedure: COLONOSCOPY TO CECUM/TI;  Surgeon: Dago Abbott MD;  Location: [facility] ENDOSCOPY;  Service: Gastroenterology;  Laterality: N/A;  pre: HX OF POLYPS  post: HEMORRHOIDS, DIVERTICULOSIS, OTHERWISE NORMAL TO TI    ENDOSCOPY N/A 3/7/2017    Procedure: ESOPHAGOGASTRODUODENOSCOPY WITH BIOPSIES;  Surgeon: Dago KEMP MD;  Location: [facility] ENDOSCOPY;  Service:     ENDOSCOPY N/A 2019    Procedure: ESOPHAGOGASTRODUODENOSCOPY with biopsies;  Surgeon: Dago Abbott MD;  Location: [facility] ENDOSCOPY;  Service: Gastroenterology    ENDOSCOPY N/A 2021    Procedure: ESOPHAGOGASTRODUODENOSCOPY WITH BX'S;  Surgeon: Dago Abbott MD;  Location: [facility] ENDOSCOPY;   Service: Gastroenterology;  Laterality: N/A;  pre: GERD, ALVARADO'S ESOPHAGUS  post: ALVARADO'S ESOPHAGUS, ESOPHAGITIS, HIATAL HERNIA,GASTRITIS    KNEE ARTHROSCOPY Left       General Information       Row Name 10/04/23 1322          OT Time and Intention    Document Type evaluation  -ES     Mode of Treatment individual therapy;occupational therapy  -ES       Row Name 10/04/23 1322          General Information    Patient Profile Reviewed yes  -ES     Prior Level of Function independent:;ADL's;all household mobility;community mobility  Patient independent with B and IADLs at baseline. No device for functional mobility. Standing shower and grooming tasks. No home O2 use. Denies recent falls.  -ES     Existing Precautions/Restrictions no known precautions/restrictions  -ES     Barriers to Rehab none identified  -ES       Row Name 10/04/23 1322          Occupational Profile    Reason for Services/Referral (Occupational Profile) Patient is 73 yr old male admitted [facility] on 10/3/2023 with reports of right sided upper and lower extremity weakness. OT evaluation and treatment ordered d/t recent decline in ADLs/transfer ability and discharge planning recommendations. No previous OT services for current condition.  -ES       Row Name 10/04/23 1322          Living Environment    People in Home spouse  -ES       Row Name 10/04/23 1322          Cognition    Orientation Status (Cognition) oriented x 3  Patient pleasant and cooperative, agreeable to therapy evaluation. Patient up walking in room at OT arrival.  -ES               User Key  (r) = Recorded By, (t) = Taken By, (c) = Cosigned By      Initials Name Provider Type    ES Patti Avila, OTR/L, CSRS Occupational Therapist                     Mobility/ADL's       Row Name 10/04/23 9774          Bed Mobility    Bed Mobility supine-sit;sit-supine  -ES     Supine-Sit [place] (Bed Mobility) independent  -ES     Sit-Supine [place] (Bed Mobility)  independent  -ES       Row Name 10/04/23 1332          Transfers    Transfers sit-stand transfer;stand-sit transfer;toilet transfer  -ES       Row Name 10/04/23 1332          Sit-Stand Transfer    Sit-Stand [place] (Transfers) independent  -ES     Assistive Device (Sit-Stand Transfers) other (see comments)  no assistive device  -ES       Row Name 10/04/23 1332          Stand-Sit Transfer    Stand-Sit [place] (Transfers) independent  -ES     Assistive Device (Stand-Sit Transfers) other (see comments)  no assistive device  -ES       Row Name 10/04/23 1332          Toilet Transfer    Type (Toilet Transfer) sit-stand;stand-sit  -ES     [place] Level (Toilet Transfer) independent  -ES     Assistive Device (Toilet Transfer) other (see comments)  no assistive device  -ES     Comment, (Toilet Transfer) from standard height commode  -ES       Row Name 10/04/23 1332          Functional Mobility    Functional Mobility- Ind. Level independent  -ES     Functional Mobility- Device other (see comments)  no assistive device  -ES     Functional Mobility- Comment Patient performs functional mobility to/from bathroom, independent without use of assistive device. No losses of balance noted. Patient ambulating independently in bathroom.  -ES       Row Name 10/04/23 1332          Activities of Daily Living    BADL Assessment/Intervention bathing;upper body dressing;lower body dressing;grooming;feeding;toileting  -ES       Row Name 10/04/23 1332          Bathing Assessment/Intervention    [place] Level (Bathing) bathing skills;independent  -ES       Row Name 10/04/23 1332          Upper Body Dressing Assessment/Training    [place] Level (Upper Body Dressing) upper body dressing skills;independent  -ES       Row Name 10/04/23 1332          Lower Body Dressing Assessment/Training    [place] Level (Lower Body Dressing) lower body dressing skills;independent  -ES       Row Name 10/04/23 1332          Grooming  Assessment/Training    [place] Level (Grooming) grooming skills;independent  -ES       Row Name 10/04/23 1332          Self-Feeding Assessment/Training    [place] Level (Feeding) feeding skills;independent  -ES       Row Name 10/04/23 1332          Toileting Assessment/Training    [place] Level (Toileting) toileting skills;adjust/manage clothing;perform perineal hygiene;change pad/brief;independent  -ES     Position (Toileting) unsupported sitting;unsupported standing  -ES               User Key  (r) = Recorded By, (t) = Taken By, (c) = Cosigned By      Initials Name Provider Type    ES Patti Avila, OTR/L, CSRS Occupational Therapist                   Obj/Interventions       Row Name 10/04/23 1337          Sensory Assessment (Somatosensory)    Sensory Assessment (Somatosensory) sensation intact  -ES     Sensory Assessment bilateral upper and lower extremity sensation intact to light and heavy touch. No sensory discrimination deficits noted at time of discharge  -ES       Row Name 10/04/23 1337          Vision Assessment/Intervention    Visual Impairment/Limitations WFL  -ES       Row Name 10/04/23 1337          Range of Motion Comprehensive    General Range of Motion bilateral upper extremity ROM WNL  -ES       Row Name 10/04/23 1337          Strength Comprehensive (MMT)    General Manual Muscle Testing (MMT) Assessment no strength deficits identified  -ES     Comment, General Manual Muscle Testing (MMT) Assessment bilateral upper extremities assessed 4+/5 with symmetrical strength at time of assessment  -ES       Row Name 10/04/23 1337          Motor Skills    Motor Skills functional endurance  -ES     Functional Endurance good  -ES       Row Name 10/04/23 1337          Balance    Balance Assessment sitting dynamic balance;standing dynamic balance  -ES     Dynamic Sitting Balance independent  -ES     Position, Sitting Balance unsupported  -ES     Dynamic Standing Balance independent  -ES      Position/Device Used, Standing Balance unsupported  -ES               User Key  (r) = Recorded By, (t) = Taken By, (c) = Cosigned By      Initials Name Provider Type    Patti Maharaj OTR/L, TANIKA Occupational Therapist                   Goals/Plan    No documentation.                  Clinical Impression       Row Name 10/04/23 1345          Plan of Care Review    Plan of Care Reviewed With patient  -ES     Progress no change  -ES     Outcome Evaluation Patient presents at or near baseline functional status at time of evaluation with no identified functional deficits that impede patient independence with activities of daily living.  No indicated need for skilled occupational therapy intervention in the acute care setting.  Occupational therapy will sign off at this time.  -ES       Row Name 10/04/23 1345          Therapy Assessment/Plan (OT)    Criteria for Skilled Therapeutic Interventions Met (OT) no problems identified which require skilled intervention  -ES     Therapy Frequency (OT) evaluation only  -ES       Row Name 10/04/23 1345          Therapy Plan Review/Discharge Plan (OT)    Anticipated Discharge Disposition (OT) home  -ES               User Key  (r) = Recorded By, (t) = Taken By, (c) = Cosigned By      Initials Name Provider Type    Patti Maharaj OTR/L, TANIKA Occupational Therapist                   Outcome Measures       Row Name 10/04/23 1345          How much help from another is currently needed...    Putting on and taking off regular lower body clothing? 4  -ES     Bathing (including washing, rinsing, and drying) 4  -ES     Toileting (which includes using toilet bed pan or urinal) 4  -ES     Putting on and taking off regular upper body clothing 4  -ES     Taking care of personal grooming (such as brushing teeth) 4  -ES     Eating meals 4  -ES     AM-PAC 6 Clicks Score (OT) 24  -ES       Row Name 10/04/23 0800          How much help from another person do you currently need...    Turning from  your back to your side while in flat bed without using bedrails? 4  -ED     Moving from lying on back to sitting on the side of a flat bed without bedrails? 4  -ED     Moving to and from a bed to a chair (including a wheelchair)? 4  -ED     Standing up from a chair using your arms (e.g., wheelchair, bedside chair)? 4  -ED     Climbing 3-5 steps with a railing? 4  -ED     To walk in hospital room? 4  -ED     AM-PAC 6 Clicks Score (PT) 24  -ED     Highest level of mobility 8 --> Walked 250 feet or more  -ED       Row Name 10/04/23 1345 10/04/23 0800       Functional Assessment    Outcome Measure Options AM-PAC 6 Clicks Daily Activity (OT);Optimal Instrument  -ES AM-PAC 6 Clicks Basic Mobility (PT)  -ED      Row Name 10/04/23 1345          Optimal Instrument    Optimal Instrument Optimal - 3  -ES     Bending/Stooping 1  -ES     Standing 1  -ES     Reaching 1  -ES     From the list, choose the 3 activities you would most like to be able to do without any difficulty Bending/stooping;Standing;Reaching  -ES     Total Score Optimal - 3 3  -ES               User Key  (r) = Recorded By, (t) = Taken By, (c) = Cosigned By      Initials Name Provider Type    Pawan Sim, PT Physical Therapist    ES Patti Avila, OTR/L, CSRS Occupational Therapist                      OT Recommendation and Plan  Therapy Frequency (OT): evaluation only  Plan of Care Review  Plan of Care Reviewed With: patient  Progress: no change  Outcome Evaluation: Patient presents at or near baseline functional status at time of evaluation with no identified functional deficits that impede patient independence with activities of daily living.  No indicated need for skilled occupational therapy intervention in the acute care setting.  Occupational therapy will sign off at this time.     Time Calculation:   Evaluation Complexity (OT)  Review Occupational Profile/Medical/Therapy History Complexity: brief/low complexity  Assessment, Occupational  Performance/Identification of Deficit Complexity: 1-3 performance deficits  Clinical Decision Making Complexity (OT): problem focused assessment/low complexity  Overall Complexity of Evaluation (OT): low complexity     Time Calculation- OT       Row Name 10/04/23 1347             Time Calculation- OT    OT Received On 10/04/23  -ES         Untimed Charges    OT Eval/Re-eval Minutes 31  -ES         Total Minutes    Untimed Charges Total Minutes 31  -ES       Total Minutes 31  -ES                User Key  (r) = Recorded By, (t) = Taken By, (c) = Cosigned By      Initials Name Provider Type    Patti Maharaj, OTR/L, CSRS Occupational Therapist                  Therapy Charges for Today       Code Description Service Date Service Provider Modifiers Qty    38485772046 HC OT EVAL LOW COMPLEXITY 3 10/4/2023 Patti Avila OTR/L, CSRS GO 1                 PRIYANK [place]/L, CSRS  10/4/2023

## 2023-10-04 NOTE — CONSULTS
[facility]   Cardiology Consult Note    Patient Name: Evan Yoon  : 1950  MRN: [id number]  Primary Care Physician:  John Fischer MD  Referring Physician: No ref. provider found  Date of admission: 10/3/2023    Subjective   Subjective     Reason for Consult/ Chief Complaint: Left arm weakness    HPI:  Evan Yoon is a 73 y.o. male admitted with CVA and left arm weakness which has since improved.  Cardiology consult was called for FE.  No chest pain shortness of breath.  No angina.    Review of Systems:      Constitutional no fever,  no weight loss   Skin no rash   Otolaryngeal no difficulty swallowing   Cardiovascular See HPI   Pulmonary no cough, no sputum production   Gastrointestinal no constipation, no diarrhea   Genitourinary no dysuria, no hematuria   Hematologic no easy bruisability, no abnormal bleeding   Musculoskeletal no muscle pain   Neurologic no dizziness, no falls     Personal History       Past Medical/Surgical History:   Past Medical History:   Diagnosis Date    Penaloza esophagus     Ex-smoker     GERD (gastroesophageal reflux disease)     Hypertension     Sleep apnea     uses CPAP     Past Surgical History:   Procedure Laterality Date    BACK SURGERY      CAROTID ENDARTERECTOMY Right     COLONOSCOPY N/A 3/7/2017    Procedure: COLONOSCOPY TO CECUM AND TERMINAL ILEUM WITH COLD BIOPSY POLYPECTOMIES;  Surgeon: Dago KEMP MD;  Location: [facility] ENDOSCOPY;  Service:     COLONOSCOPY N/A 2021    Procedure: COLONOSCOPY TO CECUM/TI;  Surgeon: Dago Abbott MD;  Location: [facility] ENDOSCOPY;  Service: Gastroenterology;  Laterality: N/A;  pre: HX OF POLYPS  post: HEMORRHOIDS, DIVERTICULOSIS, OTHERWISE NORMAL TO TI    ENDOSCOPY N/A 3/7/2017    Procedure: ESOPHAGOGASTRODUODENOSCOPY WITH BIOPSIES;  Surgeon: Dago KEMP MD;  Location: [facility] ENDOSCOPY;  Service:     ENDOSCOPY N/A 2019    Procedure: ESOPHAGOGASTRODUODENOSCOPY with biopsies;  Surgeon: Milena  Dago KEMP MD;  Location: [facility] ENDOSCOPY;  Service: Gastroenterology    ENDOSCOPY N/A 7/22/2021    Procedure: ESOPHAGOGASTRODUODENOSCOPY WITH BX'S;  Surgeon: Dago Abbott MD;  Location: [facility] ENDOSCOPY;  Service: Gastroenterology;  Laterality: N/A;  pre: GERD, ALVARADO'S ESOPHAGUS  post: ALVARADO'S ESOPHAGUS, ESOPHAGITIS, HIATAL HERNIA,GASTRITIS    KNEE ARTHROSCOPY Left          Family History: No Family History of CAD.    Social History:  reports that he quit smoking about 33 years ago. His smoking use included cigarettes. He has never used smokeless tobacco. He reports current alcohol use of about 3.0 standard drinks per week. He reports that he does not use drugs.    Medications:  Medications Prior to Admission   Medication Sig Dispense Refill Last Dose    amLODIPine (NORVASC) 10 MG tablet Take 1 tablet by mouth Daily.       aspirin 81 MG chewable tablet Chew 1 tablet Daily.       B Complex Vitamins (VITAMIN B COMPLEX PO) Take 1 tablet by mouth Daily.       cholecalciferol (VITAMIN D3) 1000 UNITS tablet Take 1 tablet by mouth Daily.       clopidogrel (PLAVIX) 75 MG tablet Take 1 tablet by mouth Daily for 30 days. 30 tablet 0     lansoprazole (PREVACID) 30 MG capsule Take 1 capsule by mouth Daily.       moexipril (UNIVASC) 15 MG tablet Take 1 tablet by mouth Every Night.       Multiple Vitamin (MULTI VITAMIN DAILY PO) Take 1 tablet by mouth Daily.       Omega-3 Fatty Acids (fish oil) 1000 MG capsule capsule Take 1 capsule by mouth Daily.       rosuvastatin (CRESTOR) 40 MG tablet Take 1 tablet by mouth Daily.       tadalafil (CIALIS) 5 MG tablet Take 1 tablet by mouth Daily.       Ventolin  (90 Base) MCG/ACT inhaler Inhale 2 puffs Every 4 (Four) Hours As Needed.       vitamin E 400 UNIT capsule Take 1 capsule by mouth Daily.        Current medications:  aspirin, 81 mg, Oral, Daily  atorvastatin, 80 mg, Oral, Nightly  cholecalciferol, 1,000 Units, Oral, Daily  clopidogrel, 75 mg, Oral,  Daily  pantoprazole, 40 mg, Oral, Q AM  sodium chloride, 10 mL, Intravenous, Q12H      Current IV drips:       Allergies:  No Known Allergies    Objective    Objective     Vitals:   Temp:  [98.4 °F (36.9 °C)-98.6 °F (37 °C)] 98.6 °F (37 °C)  Heart Rate:  [60-91] 74  Resp:  [16-18] 18  BP: (152-184)/(70-93) 161/82      Physical Exam:    Constitutional: Awake, alert, No acute distress   Eyes: PERRLA, sclerae anicteric, no conjunctival injection  HENT: NCAT, mucous membranes moist  Neck: Supple, no thyromegaly, no lymphadenopathy, trachea midline  Respiratory: Clear to auscultation bilaterally, nonlabored respirations   Cardiovascular: RRR, no murmurs, rubs, or gallops, palpable pedal pulses bilaterally  Gastrointestinal: Positive bowel sounds, soft, nontender, nondistended  Musculoskeletal: No bilateral ankle edema, no clubbing or cyanosis to extremities  Psychiatric: Appropriate affect, cooperative  Neurologic: Oriented x 3, strength symmetric in all extremities, Cranial Nerves grossly intact to confrontation, speech clear  Skin: No rashes.    Result Review    Result Review:  I have personally reviewed the results from the time of this admission to 10/4/2023 11:20 EDT and agree with these findings:  [x]  Laboratory  [x]  EKG/Telemetry   [x]  Cardiology/Vascular   []  Pathology  [x]  Old records  [x]  Medications  Basic Metabolic Panel    Sodium Sodium   Date Value Ref Range Status   10/03/2023 138 136 - 145 mmol/L Final      Potassium Potassium   Date Value Ref Range Status   10/03/2023 4.1 3.5 - 5.2 mmol/L Final      Chloride Chloride   Date Value Ref Range Status   10/03/2023 100 98 - 107 mmol/L Final      Bicarbonate No results found for: PLASMABICARB   BUN BUN   Date Value Ref Range Status   10/03/2023 6 (L) 8 - 23 mg/dL Final      Creatinine Creatinine   Date Value Ref Range Status   10/03/2023 0.64 (L) 0.76 - 1.27 mg/dL Final      Calcium Calcium   Date Value Ref Range Status   10/03/2023 9.2 8.6 - 10.5 mg/dL  Final      Glucose        Results for orders placed during the hospital encounter of 09/24/23    Adult Transthoracic Echo Complete W/ Cont if Necessary Per Protocol (With Agitated Saline)    Interpretation Summary    Left ventricular systolic function is normal. Calculated left ventricular EF = 55.7%    Estimated right ventricular systolic pressure from tricuspid regurgitation is normal (<35 mmHg).    No obveious wall motion abnormalities        No results found for: PROBNP       EKG shows sinus rhythm with no acute changes.  Telemetry reviewed    Assessment / Plan     Impression:   1.  CVA    Plan:   1.  Risk benefits of transesophageal echocardiogram discussed the patient.  Understands and consents to the procedure.  2.  Suggest 30-day event monitor to evaluate for atrial fibrillation.  3.  Continue amlodipine for blood pressure control.  4.  Continue aspirin and Plavix for now.    Electronically signed by Tone Musa MD, 10/04/23, 11:20 AM EDT.

## 2023-10-04 NOTE — PLAN OF CARE
Goal Outcome Evaluation:      Stroke RRT called when pt had new sudden onset of left sided numbness and weakness. NIH from 0-6. Pt taken to CT with no new acute stroke noted. MRI had been done earlier in night prior to this event. Orders to redo MRI this morning. At this time patients symptoms have resolved and is resting well. Will continue to monitor. Patti Castro RN

## 2023-10-04 NOTE — PLAN OF CARE
Goal Outcome Evaluation:  Plan of Care Reviewed With: patient           Outcome Evaluation: Patient did not demonstrate any safety or mobility deficits during initial evaluation.  He is safe to continue ambulating within his room independently until his discharge from the hospital.  He will be discharged from physical therapy services at this time      Anticipated Discharge Disposition (PT): home

## 2023-10-04 NOTE — SIGNIFICANT NOTE
Received call from RN pt c/o of new left sided weakness and numbness. Pt unable to hold up left arm/leg and had decreased sensation on both extremities. NIHSS 0 prior to onset, however, now NIHSS 6. Stroke RRT called and teleneurology called again. Stat CTH neg for acute hemorrhage but did reveal bilateral evolving occipital-parietal cortical infarcts. Teleneurology recommendations included continuing current level of care, and initiated DAPT. Pt received 325 mg aspirin yesterday, switched to 81 mg to start in the am. Plavis initiated yesterday. Repeat MRI w/o contrast ordered for am.    Leona Durand PA-C

## 2023-10-04 NOTE — THERAPY EVALUATION
Acute Care - Physical Therapy Initial Evaluation   [facility]     Patient Name: Evan Yoon  : 1950  MRN: [id number]  Today's Date: 10/4/2023     Admit date: 10/3/2023     Referring Physician: Mk Mejia MD     Surgery Date:* No surgery found *           Visit Dx:     ICD-10-CM ICD-9-CM   1. TIA (transient ischemic attack)  G45.9 435.9   2. Difficulty in walking  R26.2 719.7     Patient Active Problem List   Diagnosis    Penaloza's esophagus without dysplasia    Gastroesophageal reflux disease    Adenomatous polyp of colon    TIA (transient ischemic attack)    Asymptomatic stenosis of left carotid artery    Benign essential hypertension    History of right-sided carotid endarterectomy    Hyperlipidemia    Stenosis of right subclavian artery    Acute CVA (cerebrovascular accident)    Stroke     Past Medical History:   Diagnosis Date    Penaloza esophagus     Ex-smoker     GERD (gastroesophageal reflux disease)     Hypertension     Sleep apnea     uses CPAP     Past Surgical History:   Procedure Laterality Date    BACK SURGERY      CAROTID ENDARTERECTOMY Right     COLONOSCOPY N/A 3/7/2017    Procedure: COLONOSCOPY TO CECUM AND TERMINAL ILEUM WITH COLD BIOPSY POLYPECTOMIES;  Surgeon: Dago KEMP MD;  Location: [facility] ENDOSCOPY;  Service:     COLONOSCOPY N/A 2021    Procedure: COLONOSCOPY TO CECUM/TI;  Surgeon: Dago Abbott MD;  Location: [facility] ENDOSCOPY;  Service: Gastroenterology;  Laterality: N/A;  pre: HX OF POLYPS  post: HEMORRHOIDS, DIVERTICULOSIS, OTHERWISE NORMAL TO TI    ENDOSCOPY N/A 3/7/2017    Procedure: ESOPHAGOGASTRODUODENOSCOPY WITH BIOPSIES;  Surgeon: Dago KEMP MD;  Location: [facility] ENDOSCOPY;  Service:     ENDOSCOPY N/A 2019    Procedure: ESOPHAGOGASTRODUODENOSCOPY with biopsies;  Surgeon: Dago Abbott MD;  Location: [facility] ENDOSCOPY;  Service: Gastroenterology    ENDOSCOPY N/A 2021    Procedure: ESOPHAGOGASTRODUODENOSCOPY WITH BX'S;   Surgeon: Dago Abbott MD;  Location: [facility] ENDOSCOPY;  Service: Gastroenterology;  Laterality: N/A;  pre: GERD, ALVARADO'S ESOPHAGUS  post: ALVARADO'S ESOPHAGUS, ESOPHAGITIS, HIATAL HERNIA,GASTRITIS    KNEE ARTHROSCOPY Left      PT Assessment (last 12 hours)       PT Evaluation and Treatment       Row Name 10/04/23 0800          Physical Therapy Time and Intention    Subjective Information no complaints  -ED     Document Type evaluation  -ED     Mode of Treatment individual therapy;physical therapy  -ED     Patient Effort good  -ED       Row Name 10/04/23 0800          General Information    Patient Observations alert;cooperative;agree to therapy  -ED     Prior Level of Function independent:;all household mobility;community mobility  -ED     Equipment Currently Used at Home none  -ED     Existing Precautions/Restrictions fall  -ED     Barriers to Rehab none identified  -ED       Row Name 10/04/23 0800          Living Environment    Current Living Arrangements home  -ED       Row Name 10/04/23 0800          Range of Motion (ROM)    Range of Motion ROM is L  -ED       Row Name 10/04/23 0800          Strength (Manual Muscle Testing)    Strength (Manual Muscle Testing) strength is L  -ED       Row Name 10/04/23 0800          Bed Mobility    Bed Mobility bed mobility (all) activities;supine-sit  -ED     All Activities, [place] (Bed Mobility) independent  -ED     Supine-Sit [place] (Bed Mobility) independent  -ED       Row Name 10/04/23 0800          Transfers    Transfers bed-chair transfer;sit-stand transfer  -ED       Row Name 10/04/23 0800          Bed-Chair Transfer    Bed-Chair [place] (Transfers) independent  -ED       Row Name 10/04/23 0800          Sit-Stand Transfer    Sit-Stand [place] (Transfers) independent  -ED       Row Name 10/04/23 0800          Gait/Stairs (Locomotion)    Gait/Stairs Locomotion gait/ambulation independence  -ED     [place] Level (Gait) independent   -ED     Distance in Feet (Gait) 200  -ED       Row Name 10/04/23 0800          Balance    Balance Assessment standing dynamic balance  -ED     Dynamic Standing Balance independent  -ED       Row Name 10/04/23 0800          Plan of Care Review    Plan of Care Reviewed With patient  -ED     Outcome Evaluation Patient did not demonstrate any safety or mobility deficits during initial evaluation.  He is safe to continue ambulating within his room independently until his discharge from the hospital.  He will be discharged from physical therapy services at this time  -ED       Row Name 10/04/23 0800          Therapy Assessment/Plan (PT)    Criteria for Skilled Interventions Met (PT) no problems identified which require skilled intervention  -ED     Therapy Frequency (PT) evaluation only  -ED               User Key  (r) = Recorded By, (t) = Taken By, (c) = Cosigned By      Initials Name Provider Type    Pawan Sim PT Physical Therapist                    Physical Therapy Education       Title: PT OT SLP Therapies (Done)       Topic: Physical Therapy (Done)       Point: Mobility training (Done)       Learning Progress Summary             Patient Acceptance, E,TB, VU by ED at 10/4/2023 0844                         Point: Precautions (Done)       Learning Progress Summary             Patient Acceptance, E,TB, VU by ED at 10/4/2023 0844                                         User Key       Initials Effective Dates Name Provider Type Discipline    ED 06/03/21 -  Pawan Cotter PT Physical Therapist PT                  PT Recommendation and Plan  Anticipated Discharge Disposition (PT): home  Therapy Frequency (PT): evaluation only  Plan of Care Reviewed With: patient  Outcome Evaluation: Patient did not demonstrate any safety or mobility deficits during initial evaluation.  He is safe to continue ambulating within his room independently until his discharge from the hospital.  He will be discharged from physical  therapy services at this time   Outcome Measures       Row Name 10/04/23 0800             How much help from another person do you currently need...    Turning from your back to your side while in flat bed without using bedrails? 4  -ED      Moving from lying on back to sitting on the side of a flat bed without bedrails? 4  -ED      Moving to and from a bed to a chair (including a wheelchair)? 4  -ED      Standing up from a chair using your arms (e.g., wheelchair, bedside chair)? 4  -ED      Climbing 3-5 steps with a railing? 4  -ED      To walk in hospital room? 4  -ED      AM-PAC 6 Clicks Score (PT) 24  -ED         Functional Assessment    Outcome Measure Options AM-PAC 6 Clicks Basic Mobility (PT)  -ED                User Key  (r) = Recorded By, (t) = Taken By, (c) = Cosigned By      Initials Name Provider Type    Pawan Sim, SACHIN Physical Therapist                     Time Calculation:    PT Charges       Row Name 10/04/23 0841             Time Calculation    PT Received On 10/04/23  -ED      PT Goal Re-Cert Due Date 10/13/23  -ED         Untimed Charges    PT Eval/Re-eval Minutes 20  -ED         Total Minutes    Untimed Charges Total Minutes 20  -ED       Total Minutes 20  -ED                User Key  (r) = Recorded By, (t) = Taken By, (c) = Cosigned By      Initials Name Provider Type    Pawan Sim, PT Physical Therapist                  Therapy Charges for Today       Code Description Service Date Service Provider Modifiers Qty    05361304170 HC PT EVAL LOW COMPLEXITY 2 10/4/2023 Pawan Cotter PT GP 1            PT G-Codes  Outcome Measure Options: AM-PAC 6 Clicks Basic Mobility (PT)  AM-PAC 6 Clicks Score (PT): 24    Pawan Cotter PT  10/4/2023

## 2023-10-04 NOTE — CASE MANAGEMENT/SOCIAL WORK
Discharge Planning Assessment  RONNY Monroy     Patient Name: Evan Yoon  MRN: [id number]  Today's Date: 10/4/2023    Admit Date: 10/3/2023    Plan: patient is a readmission due to the same diagnosis. Patient denies needs, picked up medication at discharge and taking medications and had follow up with pcp this thursday 10/5/23. Patient notes he has spells in the morning when he wakes up where his right or left goes numb. 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 Patient confirms pharm and pcp. Patient plans to return home with family.   Discharge Needs Assessment       Row Name 10/04/23 1154       Living Environment    People in Home spouse    Current Living Arrangements home    Potentially Unsafe Housing Conditions none    Primary Care Provided by self    Family Caregiver if Needed spouse    Quality of Family Relationships helpful;involved;supportive       Resource/Environmental Concerns    Resource/Environmental Concerns none    Transportation Concerns none       Transition Planning    Patient/Family Anticipates Transition to home with family    Patient/Family Anticipated Services at Transition none    Transportation Anticipated family or friend will provide       Discharge Needs Assessment    Readmission Within the Last 30 Days no previous admission in last 30 days    Equipment Currently Used at Home cpap    Concerns to be Addressed discharge planning    Anticipated Changes Related to Illness none    Equipment Needed After Discharge none                   Discharge Plan       Row Name 10/04/23 1155       Plan    Plan patient is a readmission due to the same diagnosis. Patient denies needs, picked up medication at discharge and taking medications and had follow up with pcp this thursday 10/5/23. Patient notes he has spells in the morning when he wakes up where his right or left goes numb. 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  Patient confirms pharm and pcp. Patient plans to return home with family.    Final Discharge Disposition Code 01 - home or self-care                  Continued Care and Services - Admitted Since 10/3/2023    Coordination has not been started for this encounter.          Demographic Summary       Row Name 10/04/23 1135       General Information    Admission Type inpatient    Arrived From home;emergency department    Referral Source admission list    Reason for Consult discharge planning    Preferred Language English       Contact Information    Permission Granted to Share Info With family/designee    Contact Information Obtained for                    Functional Status       Row Name 10/04/23 1136       Functional Status    Usual Activity Tolerance good    Current Activity Tolerance good       Assessment of Health Literacy    How often do you have someone help you read hospital materials? Occasionally    How often do you have problems learning about your medical condition because of difficulty understanding written information? Never    How often do you have a problem understanding what is told to you about your medical condition? Never    How confident are you filling out medical forms by yourself? Quite a bit    Health Literacy Good       Functional Status, IADL    Medications independent    Meal Preparation independent    Housekeeping independent    Laundry independent    Shopping independent       Mental Status    General Appearance WDL WDL       Mental Status Summary    Recent Changes in Mental Status/Cognitive Functioning no changes                   Psychosocial    No documentation.                  Abuse/Neglect    No documentation.                  Legal    No documentation.                  Substance Abuse    No documentation.                  Patient Forms    No documentation.                     Letty Moon RN

## 2023-10-05 ENCOUNTER — APPOINTMENT (OUTPATIENT)
Dept: CARDIOLOGY | Facility: HOSPITAL | Age: 73
DRG: 065 | End: 2023-10-05
Payer: MEDICARE

## 2023-10-05 VITALS
BODY MASS INDEX: 33.21 KG/M2 | RESPIRATION RATE: 16 BRPM | HEIGHT: 71 IN | DIASTOLIC BLOOD PRESSURE: 83 MMHG | HEART RATE: 81 BPM | OXYGEN SATURATION: 98 % | SYSTOLIC BLOOD PRESSURE: 168 MMHG | TEMPERATURE: 98.6 F | WEIGHT: 237.22 LBS

## 2023-10-05 LAB
ANION GAP SERPL CALCULATED.3IONS-SCNC: 9.9 MMOL/L (ref 5–15)
BUN SERPL-MCNC: 7 MG/DL (ref 8–23)
BUN/CREAT SERPL: 9.9 (ref 7–25)
CALCIUM SPEC-SCNC: 9.4 MG/DL (ref 8.6–10.5)
CHLORIDE SERPL-SCNC: 100 MMOL/L (ref 98–107)
CO2 SERPL-SCNC: 25.1 MMOL/L (ref 22–29)
CREAT SERPL-MCNC: 0.71 MG/DL (ref 0.76–1.27)
DEPRECATED RDW RBC AUTO: 43.8 FL (ref 37–54)
EGFRCR SERPLBLD CKD-EPI 2021: 96.3 ML/MIN/1.73
ERYTHROCYTE [DISTWIDTH] IN BLOOD BY AUTOMATED COUNT: 12.3 % (ref 12.3–15.4)
GLUCOSE BLDC GLUCOMTR-MCNC: 115 MG/DL (ref 70–99)
GLUCOSE SERPL-MCNC: 119 MG/DL (ref 65–99)
HCT VFR BLD AUTO: 42.8 % (ref 37.5–51)
HGB BLD-MCNC: 14.8 G/DL (ref 13–17.7)
MAGNESIUM SERPL-MCNC: 2 MG/DL (ref 1.6–2.4)
MCH RBC QN AUTO: 33.4 PG (ref 26.6–33)
MCHC RBC AUTO-ENTMCNC: 34.6 G/DL (ref 31.5–35.7)
MCV RBC AUTO: 96.6 FL (ref 79–97)
PHOSPHATE SERPL-MCNC: 4.1 MG/DL (ref 2.5–4.5)
PLATELET # BLD AUTO: 153 10*3/MM3 (ref 140–450)
PMV BLD AUTO: 9.6 FL (ref 6–12)
POTASSIUM SERPL-SCNC: 3.8 MMOL/L (ref 3.5–5.2)
RBC # BLD AUTO: 4.43 10*6/MM3 (ref 4.14–5.8)
SODIUM SERPL-SCNC: 135 MMOL/L (ref 136–145)
WBC NRBC COR # BLD: 5.5 10*3/MM3 (ref 3.4–10.8)

## 2023-10-05 PROCEDURE — 92610 EVALUATE SWALLOWING FUNCTION: CPT

## 2023-10-05 PROCEDURE — 80048 BASIC METABOLIC PNL TOTAL CA: CPT | Performed by: INTERNAL MEDICINE

## 2023-10-05 PROCEDURE — 93270 REMOTE 30 DAY ECG REV/REPORT: CPT

## 2023-10-05 PROCEDURE — 85027 COMPLETE CBC AUTOMATED: CPT | Performed by: INTERNAL MEDICINE

## 2023-10-05 PROCEDURE — 94799 UNLISTED PULMONARY SVC/PX: CPT

## 2023-10-05 PROCEDURE — 84100 ASSAY OF PHOSPHORUS: CPT | Performed by: INTERNAL MEDICINE

## 2023-10-05 PROCEDURE — 99232 SBSQ HOSP IP/OBS MODERATE 35: CPT | Performed by: PSYCHIATRY & NEUROLOGY

## 2023-10-05 PROCEDURE — 83735 ASSAY OF MAGNESIUM: CPT | Performed by: INTERNAL MEDICINE

## 2023-10-05 PROCEDURE — 82948 REAGENT STRIP/BLOOD GLUCOSE: CPT

## 2023-10-05 RX ORDER — LISINOPRIL 20 MG/1
20 TABLET ORAL DAILY
Status: DISCONTINUED | OUTPATIENT
Start: 2023-10-05 | End: 2023-10-05 | Stop reason: HOSPADM

## 2023-10-05 RX ORDER — AMLODIPINE BESYLATE 5 MG/1
10 TABLET ORAL DAILY
Status: DISCONTINUED | OUTPATIENT
Start: 2023-10-05 | End: 2023-10-05 | Stop reason: HOSPADM

## 2023-10-05 RX ADMIN — CLOPIDOGREL BISULFATE 75 MG: 75 TABLET ORAL at 08:49

## 2023-10-05 RX ADMIN — AMLODIPINE BESYLATE 10 MG: 5 TABLET ORAL at 08:49

## 2023-10-05 RX ADMIN — Medication 10 ML: at 08:50

## 2023-10-05 RX ADMIN — PANTOPRAZOLE SODIUM 40 MG: 40 TABLET, DELAYED RELEASE ORAL at 05:26

## 2023-10-05 RX ADMIN — LISINOPRIL 20 MG: 20 TABLET ORAL at 08:50

## 2023-10-05 RX ADMIN — ASPIRIN 81 MG CHEWABLE TABLET 81 MG: 81 TABLET CHEWABLE at 08:49

## 2023-10-05 RX ADMIN — Medication 1000 UNITS: at 08:50

## 2023-10-05 NOTE — THERAPY EVALUATION
Acute Care - Speech Language Pathology   Swallow Initial Evaluation  [place]     Patient Name: Evan Yoon  : 1950  MRN: [id number]  Today's Date: 10/5/2023               Admit Date: 10/3/2023    Visit Dx:     ICD-10-CM ICD-9-CM   1. TIA (transient ischemic attack)  G45.9 435.9   2. Difficulty in walking  R26.2 719.7   3. Decreased activities of daily living (ADL)  Z78.9 V49.89     Patient Active Problem List   Diagnosis    Penaloza's esophagus without dysplasia    Gastroesophageal reflux disease    Adenomatous polyp of colon    TIA (transient ischemic attack)    Asymptomatic stenosis of left carotid artery    Benign essential hypertension    History of right-sided carotid endarterectomy    Hyperlipidemia    Stenosis of right subclavian artery    Acute CVA (cerebrovascular accident)    Stroke     Past Medical History:   Diagnosis Date    Penaloza esophagus     Ex-smoker     GERD (gastroesophageal reflux disease)     Hypertension     Sleep apnea     uses CPAP     Past Surgical History:   Procedure Laterality Date    BACK SURGERY      CAROTID ENDARTERECTOMY Right     COLONOSCOPY N/A 3/7/2017    Procedure: COLONOSCOPY TO CECUM AND TERMINAL ILEUM WITH COLD BIOPSY POLYPECTOMIES;  Surgeon: Dago KEMP MD;  Location: [facility] ENDOSCOPY;  Service:     COLONOSCOPY N/A 2021    Procedure: COLONOSCOPY TO CECUM/TI;  Surgeon: Dago Abbott MD;  Location: [facility] ENDOSCOPY;  Service: Gastroenterology;  Laterality: N/A;  pre: HX OF POLYPS  post: HEMORRHOIDS, DIVERTICULOSIS, OTHERWISE NORMAL TO TI    ENDOSCOPY N/A 3/7/2017    Procedure: ESOPHAGOGASTRODUODENOSCOPY WITH BIOPSIES;  Surgeon: Dago KEMP MD;  Location: [facility] ENDOSCOPY;  Service:     ENDOSCOPY N/A 2019    Procedure: ESOPHAGOGASTRODUODENOSCOPY with biopsies;  Surgeon: Dago Abbott MD;  Location: [facility] ENDOSCOPY;  Service: Gastroenterology    ENDOSCOPY N/A 2021    Procedure: ESOPHAGOGASTRODUODENOSCOPY WITH BX'S;   Surgeon: Dago Abbott MD;  Location: [facility] ENDOSCOPY;  Service: Gastroenterology;  Laterality: N/A;  pre: GERD, ALVARADO'S ESOPHAGUS  post: ALVARADO'S ESOPHAGUS, ESOPHAGITIS, HIATAL HERNIA,GASTRITIS    KNEE ARTHROSCOPY Left      Inpatient Speech Pathology Dysphagia Evaluation        PAIN SCALE: None noted    PRECAUTIONS/CONTRAINDICATIONS: None noted    SUSPECTED ABUSE/NEGLECT/EXPLOITATION: None noted    SOCIAL/PSYCHOLOGICAL NEEDS/BARRIERS: None noted    PAST SOCIAL HISTORY: Lives at home    PRIOR FUNCTION: Independent    PATIENT GOALS/EXPECTATIONS: Did not report    HISTORY: This patient is a very pleasant 73-year-old male admitted with the above diagnosis.    CURRENT DIET LEVEL: Regular diet    OBJECTIVE:    TEST ADMINISTERED: Clinical dysphagia evaluation    COGNITION/SAFETY AWARENESS: Alert and oriented    BEHAVIORAL OBSERVATIONS: Pleasant cooperative    ORAL MOTOR EXAM: Lingual and labial strength and range of motion within functional limits    VOICE QUALITY: Mild hoarseness    REFLEX EXAM: Deferred    POSTURE: 90 degrees upright    FEEDING/SWALLOWING FUNCTION: Assessed with full range of consistencies with thin liquids from spoon cup and straw, purée consistencies soft and hard solids    CLINICAL OBSERVATIONS: Oral stage is characterized by good bolus preparation and control.  Timely oral transit.  Pharyngeal phase is timely with good laryngeal elevation per palpation.  No clinical signs or symptoms of aspiration noted.  Vocal quality remained clear.    DYSPHAGIA CRITERIA: N/A    FUNCTIONAL ASSESSMENT INSTRUMENT: Patient currently scored a level 7 of 7 on Functional Communication Measures for swallowing indicating a 0% limitation in function.    ASSESSMENT/ PLAN OF CARE:  Pt presents with functional oropharyngeal swallow no clinical signs or symptoms of aspiration noted.  Vocal quality remained clear to auscultation  REHAB POTENTIAL:  Pt has good rehab potential.  The following limitations may influence  improvement/ length of tx medical status.    RECOMMENDATIONS:   1.   DIET: Regular diet-thin liquids    2.  POSITION: 90 degrees upright    3.  COMPENSATORY STRATEGIES: General swallow precautions    No further dysphagia therapy is indicated at this time.  Available for reconsult should medical status warrant.    Pt/responsible party agrees with plan of care and has been informed of all alternatives, risks and benefits.  EDUCATION  The patient has been educated in the following areas:   Dysphagia (Swallowing Impairment).          Kim Manning, SLP  10/5/2023

## 2023-10-05 NOTE — PROGRESS NOTES
[facility]  [facility] Tele[facility]urology Consult Services    Routine Consult Follow-Up    Patient Name:   Evan Yoon  YOB: 1950  Identification Number:   MRN - [id number]  Date of Service:   10/05/2023 08:27:56    Diagnosis        I63.9 - Cerebrovascular accident (CVA), unspecified mechanism (HCC)    Impression  72 yo male with history of recent TIA 2-3 weeks ago on ASA/Plavix, HTN, HLD presenting to the ED with transient episode of Right sided numbness/incoordination and vertical diplopia lasting 20-30 minutes before resolving. He has a known Left vertebral artery occlusion.    Findings:  MRI of the brain on 10/3 and 10/4: new infarct in the left occipito-parietal region, subacute in the right occipito-parietal region.  CTA head and neck - no large vessel occlusion or high-grade stenosis per report.  TTE: no cardiac thrombus, no shunt.    1. Acute ischemic infarct in the left occipito-parietal region  2. Left vert occlusion although repeat CTA didn't mentioned any occlusion.    Plan:  Asa 81mg daily and Plavix 75mg daily for 3 months and then plavix alone.  Continue statin.  LDL goal<70, A1c goal<7.  PT/OT/ST evaluations.  Continue telemetry.  30 day cardiac monitoring  No driving until cleared by ophthalmologist (need visual perimetry testing)  Outpatient neurology and cardiologist in 1-3  Neurology will sign off.    Our recommendations are outlined below    Nursing Recommendations :  Continue with Telemetry    Consultations :  Recommend Speech therapy if failed dysphagia screenPhysical therapy/Occupational therapy    Disposition :  No further recommendations  Will signoff please contact for any questions  Outpatient Neurology follow up in 1-3 weeks    Subjective  Patient seen and examined.      Examination  1A: Level of Consciousness - Alert; keenly responsive + 0  1B: Ask Month and Age - Both Questions Right + 0  1C: Blink Eyes & Squeeze Hands - Performs Both Tasks + 0  2: Test  Horizontal Extraocular Movements - Normal + 0  3: Test Visual Fields - No Visual Loss + 0  4: Test Facial Palsy (Use Grimace if Obtunded) - Normal symmetry + 0  5A: Test Left Arm Motor Drift - No Drift for 10 Seconds + 0  5B: Test Right Arm Motor Drift - No Drift for 10 Seconds + 0  6A: Test Left Leg Motor Drift - No Drift for 5 Seconds + 0  6B: Test Right Leg Motor Drift - No Drift for 5 Seconds + 0  7: Test Limb Ataxia (FNF/Heel-Shin) - No Ataxia + 0  8: Test Sensation - Normal; No sensory loss + 0  9: Test Language/Aphasia - Normal; No aphasia + 0  10: Test Dysarthria - Normal + 0  11: Test Extinction/Inattention - No abnormality + 0    NIHSS Score: 0          Patient/Family was informed the Neurology Consult would happen via TeleHealth consult by way of interactive audio and video telecommunications and consented to receiving care in this manner.    Telehealth Neurology consultation was provided. I spent 25 minutes providing telehealth care. This includes time spent for face to face visit via telemedicine, review of medical records, imaging studies and discussion of findings with providers, the patient and/or family.      Dr Eliot Nguyễn      [facility]  For Inpatient follow-up with [facility] physician please call [facility] [phone]. This is not an outpatient service. Post hospital discharge, please contact hospital directly.

## 2023-10-05 NOTE — PLAN OF CARE
Goal Outcome Evaluation:      Pt rested well with no complaints.  Pt wore CPAP throughout the night.  SOFIA JOINER RN

## 2023-10-05 NOTE — CONSULTS
Consult received per Stroke Protocol. Patient without a noted DM diagnosis, with a current HbA1c of 5.6%, and an estimated average glucose of 114 mg/dL. Blood glucose values have remained WDL, with a low of 92 mg/dL, and a high of 119 mg/dL.

## 2023-10-05 NOTE — DISCHARGE SUMMARY
[facility]         HOSPITALIST  DISCHARGE SUMMARY    Patient Name: Evan Yoon  : 1950  MRN: [id number]    Date of Admission: 10/3/2023  Date of Discharge:  10/05/23  Primary Care Physician: John Fischer MD    Consultants:  -Neurology   -Cardiology: Dr. Tone Musa    Hospital Problems:  Acute infarct involving left occipital-parietal region  Nonhemorrhagic subacute right occipital-parietal infarct  Left vertebral artery stenosis  Essential hypertension  Obstructive sleep apnea on CPAP  Obesity (BMI: 33.08)    Hospital Course     Hospital Course:  Evan Yoon is a 73 y.o. male with history of recent acute CVA involving medial right occipital lobe with no residual deficits (2023), Penaloza's esophagus, GERD, essential hypertension and obstructive sleep apnea on CPAP therapy who presented with right-sided arm weakness and right leg weakness that led to patient having a fall. Eval in ED significant for patient being hypertensive as well as tachycardic. CT head with no acute intracranial abnormalities. Hospitalist service contacted for further evaluation and management. Teleneurology consulted. Repeat MRI imaging was obtained and patient found to have new acute infarct involving left occipital-parietal region.  During hospitalization patient had acute worsening of left-sided weakness and numbness, repeat MRI brain was obtained which showed continued evolution of late acute, subacute infarcts.  Given her recurrent strokes, cardiology consulted for FE.  FE completed on 10/04/2023, EF: 60% and there was no evidence of left atrial or left atrial appendage thrombus and no evidence of right to left shunt.  Patient set up with 30-day cardiac event monitor prior to discharge.  Patient will continue on aspirin and Plavix therapy for 3 months after which she is to continue on Plavix only per neurology recommendations.  Patient stated that he would like for his PCP to send  referral for outpatient neurology after discharge.  On day of discharge patient hemodynamically stable and no additional inpatient evaluation or work-up necessary at this time, patient discharged home and is to follow-up with PCP and cardiology.    DISCHARGE Follow Up Recommendations for labs and diagnostics:   -Follow-up with PCP in 3 to 5 days.  Patient's PCP also will be referring patient to outpatient neurology.  -Follow-up with cardiology in 1 month    Day of Discharge     Vital Signs:  Temp:  [98.3 °F (36.8 °C)-98.7 °F (37.1 °C)] 98.6 °F (37 °C)  Heart Rate:  [] 81  Resp:  [12-20] 16  BP: (116-179)/(63-94) 168/83  Physical Exam:   Gen: No acute distress, Conversant, Pleasant, sitting up on edge of bed  Resp: CTAB, No w/r/r, good aeration, equal chest rise bilaterally  Card: RRR, No m/r/g  Abd: Soft, Nontender, Nondistended, + bowel sounds     Discharge Details        Discharge Medications        Continue These Medications        Instructions Start Date   amLODIPine 10 MG tablet  Commonly known as: NORVASC   10 mg, Oral, Daily      aspirin 81 MG chewable tablet   81 mg, Oral, Daily      cholecalciferol 25 MCG (1000 UT) tablet  Commonly known as: VITAMIN D3   1,000 Units, Oral, Daily      clopidogrel 75 MG tablet  Commonly known as: PLAVIX   75 mg, Oral, Daily      fish oil 1000 MG capsule capsule   1,000 mg, Oral, Daily      lansoprazole 30 MG capsule  Commonly known as: PREVACID   30 mg, Oral, Daily      moexipril 15 MG tablet  Commonly known as: UNIVASC   15 mg, Oral, Nightly      multivitamin tablet tablet  Commonly known as: THERAGRAN   1 tablet, Oral, Daily      rosuvastatin 40 MG tablet  Commonly known as: CRESTOR   40 mg, Oral, Daily      tadalafil 5 MG tablet  Commonly known as: CIALIS   5 mg, Oral, Daily      Ventolin  (90 Base) MCG/ACT inhaler  Generic drug: albuterol sulfate HFA   2 puffs, Inhalation, Every 4 Hours PRN      VITAMIN B COMPLEX PO   1 tablet, Oral, Daily      vitamin E 400  UNIT capsule   400 Units, Oral, Daily               No Known Allergies    Discharge Disposition:  Home or Self Care    Diet:  Hospital:  Diet Order   Procedures    Diet: Cardiac Diets; Healthy Heart (2-3 Na+); Texture: Regular Texture (IDDSI 7); Fluid Consistency: Thin (IDDSI 0)       Discharge Activity:   Activity Instructions       Activity as Tolerated              CODE STATUS:  Code Status and Medical Interventions:   Ordered at: 10/04/23 0733     Code Status (Patient has no pulse and is not breathing):    CPR (Attempt to Resuscitate)     Medical Interventions (Patient has pulse or is breathing):    Full Support       No future appointments.    Additional Instructions for the Follow-ups that You Need to Schedule       Discharge Follow-up with PCP   As directed       Currently Documented PCP:    John Fischer MD    PCP Phone Number:    [phone]     Follow Up Details: Follow-up in 3-5 days        Discharge Follow-up with Specified Provider: Dr. Tone Musa; 1 Month   As directed      To: Dr. Tone Musa   Follow Up: 1 Month                Pertinent  and/or Most Recent Results     PROCEDURES:   -Transesophageal echocardiogram (10/04/2023)    RADIOLOGY:  CT Angiogram Head [[id number]] Luis as Reviewed   Order Status: Completed Collected: 10/04/23 1357    Updated: 10/04/23 1400   Narrative:     PROCEDURE: CT ANGIOGRAM HEAD, 10/04/2023, 12:40  CT ANGIOGRAM NECK, 10/04/2023, 12:40     COMPARISON: [facility], CT, CT HEAD WO CONTRAST, 10/03/2023, 10:42.     INDICATIONS: Stroke, follow up     PROTOCOL:   Standard imaging protocol performed     RADIATION:   DLP: 1188 mGy*cm    MA and/or KV was adjusted to minimize radiation dose.     CONTRAST: 100 cc Isovue 370 I.V.     TECHNIQUE: After obtaining the patient's consent, CT images of the head were obtained without and  with non-ionic contrast, and multi-planar/3-D imaging were created and interpreted to optimize  visualization of vascular  anatomy.       FINDINGS:     CTA HEAD  There is no evidence for thrombus formation or arterial occlusion. The arteries of the [place] of  [place] are patent.  Mild atherosclerotic changes are noted. There is no evidence for  hemodynamically significant stenosis. There is no evidence for aneurysm.     No abnormal focal enhancement or abnormal enhancing mass is seen. There is no evidence for abnormal  cerebral edema. There is no mass effect or midline shift. The ventricular system is nondilated. The  basilar cisterns are patent.     CTA NECK  A normal aortic arch branching pattern is observed. Mild atherosclerosis is noted. The origins of  the common carotid arteries are patent bilaterally. There is no evidence for stenosis throughout  the common carotid arteries bilaterally.     Postoperative changes related to prior right carotid endarterectomy are noted.  The right carotid  bulb is patent.  The right internal carotid artery is patent based on NASCET criteria.     There is moderate atherosclerosis at the left carotid bulb which extends into the origin of the  left internal carotid artery.  There is evidence for an approximately 42% stenosis at the origin of  the left internal carotid artery based on NASCET criteria.  The remaining distributions of the left  ICA are patent.     The origins of the vertebral arteries are patent bilaterally. There is a right dominant  vertebrobasilar system. There is no evidence for significant stenosis throughout the vertebral  arteries.     No acute abnormalities are seen throughout the neck soft tissues. There is no evidence for abnormal  edema or abnormal fluid collection. No significant lymphadenopathy is observed. No acute osseous  abnormalities are seen. The lung apices are clear.         Impression:       1. No evidence for arterial thrombus formation or arterial occlusion.  2. Evidence for a 42% stenosis at the origin of the left internal carotid artery.    3. There is no  evidence for arterial dissection.  4. No evidence for aneurysm.  5. No evidence for acute abnormality throughout the head or neck.         SERVANDO GARCIA MD        Electronically Signed and Approved By: SERVANDO GARCIA MD on 10/04/2023 at 13:57                   CT Angiogram Neck [[id number]] Luis as Reviewed   Order Status: Completed Collected: 10/04/23 1357    Updated: 10/04/23 1401   Narrative:     PROCEDURE: CT ANGIOGRAM HEAD, 10/04/2023, 12:40  CT ANGIOGRAM NECK, 10/04/2023, 12:40     COMPARISON: [facility], CT, CT HEAD WO CONTRAST, 10/03/2023, 10:42.     INDICATIONS: Stroke, follow up     PROTOCOL:   Standard imaging protocol performed     RADIATION:   DLP: 1188 mGy*cm    MA and/or KV was adjusted to minimize radiation dose.     CONTRAST: 100 cc Isovue 370 I.V.     TECHNIQUE: After obtaining the patient's consent, CT images of the head were obtained without and  with non-ionic contrast, and multi-planar/3-D imaging were created and interpreted to optimize  visualization of vascular anatomy.       FINDINGS:     CTA HEAD  There is no evidence for thrombus formation or arterial occlusion. The arteries of the [place] of  [place] are patent.  Mild atherosclerotic changes are noted. There is no evidence for  hemodynamically significant stenosis. There is no evidence for aneurysm.     No abnormal focal enhancement or abnormal enhancing mass is seen. There is no evidence for abnormal  cerebral edema. There is no mass effect or midline shift. The ventricular system is nondilated. The  basilar cisterns are patent.     CTA NECK  A normal aortic arch branching pattern is observed. Mild atherosclerosis is noted. The origins of  the common carotid arteries are patent bilaterally. There is no evidence for stenosis throughout  the common carotid arteries bilaterally.     Postoperative changes related to prior right carotid endarterectomy are noted.  The right carotid  bulb is patent.  The right internal carotid  artery is patent based on NASCET criteria.     There is moderate atherosclerosis at the left carotid bulb which extends into the origin of the  left internal carotid artery.  There is evidence for an approximately 42% stenosis at the origin of  the left internal carotid artery based on NASCET criteria.  The remaining distributions of the left  ICA are patent.     The origins of the vertebral arteries are patent bilaterally. There is a right dominant  vertebrobasilar system. There is no evidence for significant stenosis throughout the vertebral  arteries.     No acute abnormalities are seen throughout the neck soft tissues. There is no evidence for abnormal  edema or abnormal fluid collection. No significant lymphadenopathy is observed. No acute osseous  abnormalities are seen. The lung apices are clear.         Impression:       1. No evidence for arterial thrombus formation or arterial occlusion.  2. Evidence for a 42% stenosis at the origin of the left internal carotid artery.    3. There is no evidence for arterial dissection.  4. No evidence for aneurysm.  5. No evidence for acute abnormality throughout the head or neck.         SERVANDO GARCIA MD        Electronically Signed and Approved By: SERVANDO GARCIA MD on 10/04/2023 at 13:57                   MRI Brain Without Contrast [[id number]] Luis as Reviewed   Order Status: Completed Collected: 10/04/23 1241    Updated: 10/04/23 1244   Narrative:     PROCEDURE: MRI BRAIN WO CONTRAST     COMPARISON: [facility], CT, CT HEAD WO CONTRAST, 10/03/2023, 10:42.  [facility], MR, MRI BRAIN W WO CONTRAST, 10/03/2023, 21:45.  [facility], MR, MRI BRAIN  WO CONTRAST, 9/24/2023, 17:29.  [facility], CT, CT HEAD WO CONTRAST STROKE PROTOCOL,  10/04/2023, 1:57.     INDICATIONS: stroke follow up; new deficits           TECHNIQUE: A variety of imaging planes and parameters were utilized for visualization of suspected  pathology.   Images were performed without contrast.     FINDINGS:  Study is limited by motion.  Given the limitations of the exam there is no significant change and  the size of distribution of acute diffusion restriction abnormality in the medial aspect of the  left occipital lobe.  Increased prominence of FLAIR and T2 signal abnormality in this region is  compatible with evolution of infarct.  Trace amount of petechial hemorrhage again noted Evaluation  of diffusion weighted imaging in the inferior aspect of the temporal lobes is motion compromised.    Increased signal on the right may correspond with subacute infarct.  There is signal abnormality on  the FLAIR and T2 weighted imaging in this region.  Additional areas of FLAIR and T2 signal  abnormality within the supratentorial white matter is compatible small-vessel ischemic disease in  this age group.     The ventricles, cisterns and sulci appear grossly stable..     Sinuses, mastoid air cells and orbits are grossly stable.  Midline structures of the brain are  stable      Impression:       1. Continued evolution of late acute, subacute infarcts without definite new acute ischemic change  appreciated               ANTWON RUSHING MD        Electronically Signed and Approved By: ANTWON RUSHING MD on 10/04/2023 at 12:41                   CT Head Without Contrast [[id number]] Luis as Reviewed   Order Status: Completed Collected: 10/04/23 0218    Updated: 10/04/23 0221   Narrative:     PROCEDURE: CT HEAD WO CONTRAST STROKE PROTOCOL     COMPARISONS: 10/3/2023; 9/24/2023.     INDICATIONS: 73-year-old male w/ new acute stroke deficit; possible acute ischemic stroke (AIS);  left-sided; h/o prior/recent cva's; recent h/o bilateral occipital-parietal cortical infarcts.     PROTOCOL:   Standard CT imaging protocol performed.     RADIATION:   Total DLP: 1,017.2 mGy*cm.    MA and/or KV were/was adjusted to minimize radiation dose.     TECHNIQUE: After obtaining the patient's consent,  "130 CT images were obtained without non-ionic  intravenous contrast material.     DISCUSSION:  A routine nonenhanced head CT was performed. No acute brain abnormality is identified. No acute  intracranial hemorrhage. No acute infarction. No acute skull fracture. No midline shift or acute  intracranial mass effect is seen.  Mild chronic small vessel ischemia/infarction is suspected.  There are arterial calcifications. The extra-axial spaces and the ventricular system are prominent,  suggesting central atrophy.  The patient has undergone bilateral cataract extractions with  intra-ocular lens implants.  Degenerative changes involve the partially imaged cervical spine.    Mild-to-moderate age-indeterminate mucosal thickening involves the imaged paranasal sinuses.  No  air-fluid interfaces are seen within the imaged paranasal sinuses.  There is mild chronic rightward  nasal septal deviation.  The other findings are as described in recent imaging reports.      Impression:       No acute brain abnormality is seen.  No acute intracranial hemorrhage is seen by CT.  No definite  acute infarct is appreciated.  There are evolving bilateral occipital-parietal cortical infarcts.    No midline shift or acute intracranial herniation syndrome.        A preliminary report (regarding \"no acute intracranial hemorrhage\") was relayed to the [facility] Stroke  Team by the performing CT technologist at approximately 0207 hours on 10/4/2023.       Please note that portions of this note were completed with a voice recognition program.     JUSTIN DORSEY JR, MD        Electronically Signed and Approved By: JUSTIN DORSEY JR, MD on 10/04/2023 at 2:18                      MRI Brain With & Without Contrast [[id number]] Luis as Reviewed   Order Status: Completed Collected: 10/04/23 0140    Updated: 10/04/23 0207   Narrative:     PROCEDURE: MRI BRAIN W WO CONTRAST     COMPARISONS: 10/3/2023; 9/24/2023.     INDICATIONS: recurrent stroke-like symptoms on " the right w/ double vision     CONTRAST: 20 mL of MultiHance, gadobenate dimeglumine, I.V.     TECHNIQUE: A variety of imaging planes and parameters were utilized for visualization of suspected  pathology within the brain.  923 magnetic resonance (MR) images were obtained without and with  gadolinium contrast.     FINDINGS: There is a new area of acute infarction involving the medial left occipito-parietal  region.  It is thought to predominantly involve the left posterior cingulate gyrus and the left  precuneus gyrus of the (left) parietal lobe.  The finding is well seen on image 57 of series 8 and  adjacent images and images 37 and 38 of series 3.  It measures approximately 3.5 x 1.4 x 1.4 cm in  anteroposterior (AP), transverse, and craniocaudal extent, respectively.  It is most consistent  with an acute infarct in the distribution of the left posterior cerebral artery (PCA).  There is  mild petechial hemorrhage associated with this infarct, as seen on image 14 of series 12, as with  an ECASS-II hemorrhagic infarction type 1 (HI 1).     The previously seen acute infarct in the right occipital-parietal region (on the 9/24/2023 studies)  has evolved and no longer demonstrates restricted diffusion.  There is associated cortical  enhancement, however, which is consistent with a subacute infarct in this region, as seen on image  98 of series 100 and adjacent images.     No acute intracranial hemorrhage is identified elsewhere.  No other areas of restricted diffusion  are seen.  There may be some degree of arterial enhancement associated with the aforementioned new  acute left occipital-parietal infarct.  No enhancement abnormality is seen elsewhere.         Again, there are probably moderate-to-severe atherosclerotic changes (with possible hemodynamically  significant stenoses) involving the proximal intradural portions (V4 segments) of the bilateral  vertebral arteries.  This finding may be significant, considering  the bilateral occipital-parietal  infarcts, in the bilateral PCA distributions (with hypoplastic or absent posterior communicating  arteries bilaterally).       The extra-axial spaces and to a lesser extent ventricular system are prominent, suggesting central  atrophy.  There is probably mild chronic small vessel ischemia/infarction as evidenced by T2/FLAIR  hyperintensities, especially in the periventricular cerebral white matter.     The patient has undergone bilateral cataract extractions with intra-ocular lens implants.  Mild  age-indeterminate mucosal thickening involves the imaged paranasal sinuses.  No air-fluid  interfaces are seen within the imaged paranasal sinuses.       The case was reviewed prior to its completion with the performing MRI technologist at approximately  2207 hours on 10/3/2023.      Impression:     There is a new acute infarct involving the left occipito-parietal region as detailed  above.  It contains minimal petechial hemorrhage.  There is an evolving nonhemorrhagic subacute  right occipito-parietal infarct.  No other infarcts are seen.  Please see above comments for  further detail.              Please note that portions of this note were completed with a voice recognition program.     JUSTIN DORSEY JR, MD        Electronically Signed and Approved By: JUSTIN DORSEY JR, MD on 10/04/2023 at 2:04                      XR Chest 1 View [[id number]] Luis as Reviewed   Order Status: Completed Collected: 10/03/23 1136    Updated: 10/03/23 1139   Narrative:     PROCEDURE: XR CHEST 1 VW     COMPARISON: None     INDICATIONS: Weak/Dizzy/AMS triage protocol/generalized weakness, cough     FINDINGS:     The lungs are well-expanded. The heart and pulmonary vasculature are within normal limits. No  pleural effusions are identified. There are no active appearing infiltrates.     IMPRESSION: No active disease.     MYNOR MCKNIGHT MD        Electronically Signed and Approved By: MYNOR MCKNIGHT MD on  10/03/2023 at 11:36                   CT Head Without Contrast [[id number]] Luis as Reviewed   Order Status: Completed Collected: 10/03/23 1108    Updated: 10/03/23 1112   Narrative:     PROCEDURE: CT HEAD WO CONTRAST     COMPARISON: 09/24/2023.     INDICATIONS: Neuro deficit, persistent/recurrent, CNS neoplasm suspected     PROTOCOL:   Standard imaging protocol performed     RADIATION:   DLP: 1081.2mGy*cm    MA and/or KV was adjusted to minimize radiation dose.        TECHNIQUE: After obtaining the patient's consent, CT images were obtained without non-ionic  intravenous contrast material.     FINDINGS:  Brain:  No acute intracranial hemorrhage or mass effect noted.  Mild degree of diffuse atrophy is  appreciated.  Mild white matter changes compatible small-vessel ischemic disease in this age group  noted.     Orbits:  Orbits are grossly unremarkable and periorbital soft tissues appear grossly unremarkable.     Sinuses:  Paranasal sinuses are clear.  Mastoid air cells are clear.     Calvarium and soft tissues:  Bony calvarium is intact.  Soft tissues are grossly unremarkable in  appearance.         Impression:       1. White matter changes compatible small-vessel ischemic disease and prior small infarcts  2. No acute intracranial abnormality noted  3. Mild diffuse atrophy            ANTWON RUSHING MD        Electronically Signed and Approved By: ANTWON RUSHING MD on 10/03/2023 at 11:08         LAB RESULTS:      Lab 10/05/23  0455 10/03/23  1032   WBC 5.50 4.87   HEMOGLOBIN 14.8 15.2   HEMATOCRIT 42.8 44.7   PLATELETS 153 179   NEUTROS ABS  --  3.31   IMMATURE GRANS (ABS)  --  0.02   LYMPHS ABS  --  0.62*   MONOS ABS  --  0.72   EOS ABS  --  0.18   MCV 96.6 98.9*         Lab 10/05/23  0455 10/04/23  0436 10/03/23  1032   SODIUM 135*  --  138   POTASSIUM 3.8  --  4.1   CHLORIDE 100  --  100   CO2 25.1  --  22.9   ANION GAP 9.9  --  15.1*   BUN 7*  --  6*   CREATININE 0.71*  --  0.64*   EGFR 96.3  --  99.3    GLUCOSE 119*  --  97   CALCIUM 9.4  --  9.2   MAGNESIUM 2.0  --  1.9   PHOSPHORUS 4.1  --   --    HEMOGLOBIN A1C  --  5.60  --          Lab 10/03/23  1032   TOTAL PROTEIN 7.3   ALBUMIN 4.4   GLOBULIN 2.9   ALT (SGPT) 36   AST (SGOT) 57*   BILIRUBIN 0.9   ALK PHOS 54         Lab 10/03/23  1032   HSTROP T 11         Lab 10/04/23  0436   CHOLESTEROL 123   LDL CHOL 40   HDL CHOL 66*   TRIGLYCERIDES 87             Brief Urine Lab Results  (Last result in the past 365 days)        Color   Clarity   Blood   Leuk Est   Nitrite   Protein   CREAT   Urine HCG        10/03/23 1032 Yellow   Clear   Negative   Negative   Negative   Negative                 Microbiology Results (last 10 days)       ** No results found for the last 240 hours. **            Results for orders placed during the hospital encounter of 10/03/23    Adult Transesophageal Echo (FE) W/ Cont if Necessary Per Protocol    Interpretation Summary    Left ventricular systolic function is normal. Estimated left ventricular EF = 60%    Saline test results are negative.    There is calcification of the aortic valve.    Transesophageal echocardiogram shows no evidence of left atrial or left atrial appendage thrombus.  No evidence of right-to-left shunt.  Normal left ventricular systolic function.  Mild MR.    Time spent on Discharge including face to face service: Greater than 30 minutes    Electronically signed by Mk Mejia MD, 10/05/23, 9:28 AM EDT.

## 2023-10-06 ENCOUNTER — READMISSION MANAGEMENT (OUTPATIENT)
Dept: CALL CENTER | Facility: HOSPITAL | Age: 73
End: 2023-10-06
Payer: MEDICARE

## 2023-10-06 NOTE — OUTREACH NOTE
Prep Survey      Flowsheet Row Responses   [facility] facility patient discharged from? [facility]   Is LACE score < 7 ? No   Eligibility Readm Mgmt   Discharge diagnosis stroke   Does the patient have one of the following disease processes/diagnoses(primary or secondary)? Stroke   Does the patient have Home health ordered? No   Is there a DME ordered? No   Comments regarding appointments listed on AVS   Prep survey completed? Yes            Roxanne MUIR - Registered Nurse

## 2023-10-09 LAB
BH CV ECHO SHUNT ASSESSMENT PERFORMED (HIDDEN SCRIPTING): 1
LV EF 2D ECHO EST: 60 %
MAXIMAL PREDICTED HEART RATE: 146
STRESS TARGET HR: 124

## 2023-10-10 ENCOUNTER — READMISSION MANAGEMENT (OUTPATIENT)
Dept: CALL CENTER | Facility: HOSPITAL | Age: 73
End: 2023-10-10
Payer: MEDICARE

## 2023-10-10 NOTE — OUTREACH NOTE
Stroke Week 1 Survey      Flowsheet Row Responses   [facility] facility patient discharged from? [facility]   Does the patient have one of the following disease processes/diagnoses(primary or secondary)? Stroke   Week 1 attempt successful? No   Unsuccessful attempts Attempt 1            Mariah DYSON - Registered Nurse

## 2023-10-18 ENCOUNTER — READMISSION MANAGEMENT (OUTPATIENT)
Dept: CALL CENTER | Facility: HOSPITAL | Age: 73
End: 2023-10-18
Payer: MEDICARE

## 2023-10-18 NOTE — OUTREACH NOTE
Stroke Week 2 Survey      Flowsheet Row Responses   [facility] patient discharged from? [place]   Does the patient have one of the following disease processes/diagnoses(primary or secondary)? Stroke   Week 2 attempt successful? Yes   Call start time 0850   Call end time 0854   Discharge diagnosis stroke   Meds reviewed with patient/caregiver? Yes   Is the patient taking all medications as directed (includes completed medication regime)? Yes   Does the patient have a primary care provider?  Yes   Does the patient have an appointment with their PCP within 7 days of discharge? Yes   Comments regarding PCP 10/19/23   Has the patient kept scheduled appointments due by today? Yes   Comments has seen neuro   Has home health visited the patient within 72 hours of discharge? N/A   Psychosocial issues? No   Does the patient require any assistance with activities of daily living such as eating, bathing, dressing, walking, etc.? No   Does the patient have any residual symptoms from stroke/TIA? No   Does the patient understand the diet ordered at discharge? Yes   Did the patient receive a copy of their discharge instructions? Yes   Nursing interventions Reviewed instructions with patient   What is the patient's perception of their health status since discharge? Improving   Nursing interventions Nurse provided patient education   Is the patient able to teach back FAST for Stroke? B alance: Watch for sudden loss of balance, E yes: Check for vision loss, F ace: Look for an uneven smile, A rm: Check if one arm is weak, S peech: Listen for slurred speech, T nancy: Call 9-1-1 right away   Is the patient/caregiver able to teach back the risk factors for a stroke? High blood pressure-goal below 120/80, High Cholesterol, Carotid or other artery disease, History of TIAs   Is the patient/caregiver able to teach back the hierarchy of who to call/visit for symptoms/problems? PCP, Specialist, Home health nurse, Urgent Care, ED, 911 Yes    Week 2 call completed? Yes   Revoked No further contact(revokes)-requires comment   Graduated/Revoked comments Pt reports he is doing better and has seen neuro. Pt is talking to family and PCP on if he will have surgery to clear artery in neck.   Call end time [id number]            TYRESE BRUSH - Registered Nurse

## 2023-11-07 ENCOUNTER — TELEPHONE (OUTPATIENT)
Dept: CARDIOLOGY | Facility: CLINIC | Age: 73
End: 2023-11-07
Payer: MEDICARE

## 2023-11-07 NOTE — TELEPHONE ENCOUNTER
----- Message from ZOEY Basilio sent at 11/7/2023 12:42 PM EST -----  Patient was monitored for 30 days.  There is underlying sinus tachycardia.  There is no evidence of atrial fibrillation.  Continue current meds and follow up as scheduled

## 2023-11-15 NOTE — PROGRESS NOTES
[facility]  Cardiology progress Note    Patient Name: Evan Yoon  : 1950    CHIEF COMPLAINT  Hypertension        Subjective   Subjective     HISTORY OF PRESENT ILLNESS    Evan Yoon is a 73 y.o. male with history of hypertension.  No chest pain or shortness of breath.    REVIEW OF SYSTEMS    Constitutional:    No fever, no weight loss  Skin:     No rash  Otolaryngeal:    No difficulty swallowing  Cardiovascular: See HPI.  Pulmonary:    No cough, no sputum production    Personal History     Social History:    reports that he quit smoking about 33 years ago. His smoking use included cigarettes. He has never used smokeless tobacco. He reports current alcohol use of about 3.0 standard drinks of alcohol per week. He reports that he does not use drugs.    Home Medications:  Current Outpatient Medications on File Prior to Visit   Medication Sig    amLODIPine (NORVASC) 10 MG tablet Take 1 tablet by mouth Daily.    aspirin 81 MG chewable tablet Chew 1 tablet Daily.    B Complex Vitamins (VITAMIN B COMPLEX PO) Take 1 tablet by mouth Daily.    cholecalciferol (VITAMIN D3) 1000 UNITS tablet Take 1 tablet by mouth Daily.    lansoprazole (PREVACID) 30 MG capsule Take 1 capsule by mouth Daily.    moexipril (UNIVASC) 15 MG tablet Take 1 tablet by mouth Every Night.    Multiple Vitamin (MULTI VITAMIN DAILY PO) Take 1 tablet by mouth Daily.    Omega-3 Fatty Acids (fish oil) 1000 MG capsule capsule Take 1 capsule by mouth Daily.    rosuvastatin (CRESTOR) 40 MG tablet Take 1 tablet by mouth Daily.    tadalafil (CIALIS) 5 MG tablet Take 1 tablet by mouth Daily.    Ventolin  (90 Base) MCG/ACT inhaler Inhale 2 puffs Every 4 (Four) Hours As Needed.    vitamin E 400 UNIT capsule Take 1 capsule by mouth Daily.     No current facility-administered medications on file prior to visit.       Past Medical History:   Diagnosis Date    Penaloza esophagus     Ex-smoker     GERD (gastroesophageal reflux disease)      Hypertension     Sleep apnea     uses CPAP       Allergies:  No Known Allergies    Objective    Objective       Vitals:   Heart Rate:  [80-83] 83  BP: (117-164)/(77-81) 117/81  Body mass index is 31.99 kg/m².     PHYSICAL EXAM:    General Appearance:   well developed  well nourished  HENT:   oropharynx moist  lips not cyanotic  Neck:  thyroid not enlarged  supple  Respiratory:  no respiratory distress  normal breath sounds  no rales  Cardiovascular:  no jugular venous distention  regular rhythm  apical impulse normal  S1 normal, S2 normal  no S3, no S4   no murmur  no rub, no thrill  carotid pulses normal; no bruit  pedal pulses normal  lower extremity edema: none    Skin:   warm, dry  Psychiatric:  judgement and insight appropriate  normal mood and affect        Result Review:  I have personally reviewed the available results from  [x]  Laboratory  [x]  EKG  [x]  Cardiology  [x]  Medications  [x]  Old records  []  Other:     Procedures  Lab Results   Component Value Date    CHOL 123 10/04/2023    CHOL 128 09/25/2023     Lab Results   Component Value Date    TRIG 87 10/04/2023    TRIG 83 09/25/2023     Lab Results   Component Value Date    HDL 66 (H) 10/04/2023    HDL 69 (H) 09/25/2023     Lab Results   Component Value Date    LDL 40 10/04/2023    LDL 43 09/25/2023     Lab Results   Component Value Date    VLDL 17 10/04/2023    VLDL 16 09/25/2023     Results for orders placed during the hospital encounter of 10/03/23    Adult Transesophageal Echo (FE) W/ Cont if Necessary Per Protocol    Interpretation Summary    Left ventricular systolic function is normal. Estimated left ventricular EF = 60%    Saline test results are negative.    There is calcification of the aortic valve.    Transesophageal echocardiogram shows no evidence of left atrial or left atrial appendage thrombus.  No evidence of right-to-left shunt.  Normal left ventricular systolic function.  Mild MR.     Impression/Plan:  1.  Essential hypertension  controlled: Continue amlodipine 10 mg once a day.  Continue Univasc 15 mg once a day.  Monitor blood pressure regularly.  2.  Hyperlipidemia: Continue Crestor 40 mg once a day.  Monitor lipid and hepatic profile.  3.  Recent TIA: Event monitor showed no significant arrhythmias.  Continue aspirin 81 mg once a day.        Tone Musa MD   11/17/23   12:36 EST

## 2023-11-17 ENCOUNTER — OFFICE VISIT (OUTPATIENT)
Dept: CARDIOLOGY | Facility: CLINIC | Age: 73
End: 2023-11-17
Payer: MEDICARE

## 2023-11-17 ENCOUNTER — TELEPHONE (OUTPATIENT)
Dept: CARDIOLOGY | Facility: CLINIC | Age: 73
End: 2023-11-17

## 2023-11-17 VITALS
SYSTOLIC BLOOD PRESSURE: 117 MMHG | HEIGHT: 71 IN | DIASTOLIC BLOOD PRESSURE: 81 MMHG | HEART RATE: 83 BPM | WEIGHT: 229.4 LBS | BODY MASS INDEX: 32.11 KG/M2

## 2023-11-17 DIAGNOSIS — I10 HYPERTENSION, ESSENTIAL: ICD-10-CM

## 2023-11-17 DIAGNOSIS — E78.2 HYPERLIPEMIA, MIXED: Primary | ICD-10-CM

## 2023-11-17 NOTE — TELEPHONE ENCOUNTER
Pt was seen on 11/17/23 for a hospital follow up . He has goran seeing another cardiologist and wishes to stay with him no follow up apt was made

## 2025-04-22 ENCOUNTER — TRANSCRIBE ORDERS (OUTPATIENT)
Dept: ADMINISTRATIVE | Facility: HOSPITAL | Age: 75
End: 2025-04-22
Payer: MEDICARE

## 2025-04-22 DIAGNOSIS — R10.10 UPPER ABDOMINAL PAIN: Primary | ICD-10-CM

## 2025-04-23 ENCOUNTER — TRANSCRIBE ORDERS (OUTPATIENT)
Dept: ADMINISTRATIVE | Facility: HOSPITAL | Age: 75
End: 2025-04-23
Payer: MEDICARE

## 2025-04-23 DIAGNOSIS — R10.10 UPPER ABDOMINAL PAIN: Primary | ICD-10-CM

## 2025-04-24 ENCOUNTER — HOSPITAL ENCOUNTER (OUTPATIENT)
Dept: CT IMAGING | Facility: HOSPITAL | Age: 75
Discharge: HOME | End: 2025-04-24
Admitting: FAMILY MEDICINE
Payer: MEDICARE

## 2025-04-24 DIAGNOSIS — R10.10 UPPER ABDOMINAL PAIN: ICD-10-CM

## 2025-04-24 LAB
CREAT BLDA-MCNC: 0.8 MG/DL (ref 0.6–1.3)
EGFRCR SERPLBLD CKD-EPI 2021: 91.7 ML/MIN/1.73

## 2025-04-24 PROCEDURE — 74178 CT ABD&PLV WO CNTR FLWD CNTR: CPT

## 2025-04-24 PROCEDURE — 25510000001 IOPAMIDOL PER 1 ML: Performed by: FAMILY MEDICINE

## 2025-04-24 PROCEDURE — 82565 ASSAY OF CREATININE: CPT

## 2025-04-24 RX ORDER — IOPAMIDOL 755 MG/ML
100 INJECTION, SOLUTION INTRAVASCULAR
Status: COMPLETED | OUTPATIENT
Start: 2025-04-24 | End: 2025-04-24

## 2025-04-24 RX ADMIN — IOPAMIDOL 100 ML: 755 INJECTION, SOLUTION INTRAVENOUS at 16:23

## 2025-06-30 ENCOUNTER — HOSPITAL ENCOUNTER (INPATIENT)
Facility: HOSPITAL | Age: 75
LOS: 3 days | Discharge: HOME | End: 2025-07-03
Attending: EMERGENCY MEDICINE | Admitting: HOSPITALIST
Payer: MEDICARE

## 2025-06-30 ENCOUNTER — APPOINTMENT (OUTPATIENT)
Dept: CT IMAGING | Facility: HOSPITAL | Age: 75
End: 2025-06-30
Payer: MEDICARE

## 2025-06-30 ENCOUNTER — APPOINTMENT (OUTPATIENT)
Dept: GENERAL RADIOLOGY | Facility: HOSPITAL | Age: 75
End: 2025-06-30
Payer: MEDICARE

## 2025-06-30 DIAGNOSIS — R27.0 ATAXIA: ICD-10-CM

## 2025-06-30 DIAGNOSIS — R94.31 QT PROLONGATION: ICD-10-CM

## 2025-06-30 DIAGNOSIS — R68.81 EARLY SATIETY: Primary | ICD-10-CM

## 2025-06-30 DIAGNOSIS — E87.1 HYPONATREMIA: ICD-10-CM

## 2025-06-30 DIAGNOSIS — R10.84 GENERALIZED ABDOMINAL PAIN: ICD-10-CM

## 2025-06-30 DIAGNOSIS — E87.6 HYPOKALEMIA: ICD-10-CM

## 2025-06-30 PROBLEM — Z86.73 HISTORY OF ISCHEMIC STROKE: Status: ACTIVE | Noted: 2025-06-30

## 2025-06-30 PROBLEM — I73.9 PVD (PERIPHERAL VASCULAR DISEASE): Status: ACTIVE | Noted: 2025-06-30

## 2025-06-30 PROBLEM — Z86.73 HISTORY OF CVA (CEREBROVASCULAR ACCIDENT): Status: ACTIVE | Noted: 2025-06-30

## 2025-06-30 PROBLEM — F10.90 ALCOHOL USE DISORDER: Status: ACTIVE | Noted: 2025-06-30

## 2025-06-30 PROBLEM — S22.32XA LEFT RIB FRACTURE: Status: ACTIVE | Noted: 2025-06-30

## 2025-06-30 PROBLEM — R19.7 DIARRHEA: Status: ACTIVE | Noted: 2025-06-30

## 2025-06-30 LAB
ALBUMIN SERPL-MCNC: 3.7 G/DL (ref 3.5–5.2)
ALBUMIN/GLOB SERPL: 1.5 G/DL
ALP SERPL-CCNC: 64 U/L (ref 39–117)
ALT SERPL W P-5'-P-CCNC: 22 U/L (ref 1–41)
AMPHET+METHAMPHET UR QL: NEGATIVE
AMPHETAMINES UR QL: NEGATIVE
ANION GAP SERPL CALCULATED.3IONS-SCNC: 17.2 MMOL/L (ref 5–15)
ANION GAP SERPL CALCULATED.3IONS-SCNC: 18 MMOL/L (ref 5–15)
AST SERPL-CCNC: 54 U/L (ref 1–40)
BACTERIA UR QL AUTO: NORMAL /HPF
BARBITURATES UR QL SCN: NEGATIVE
BASOPHILS # BLD AUTO: 0.01 10*3/MM3 (ref 0–0.2)
BASOPHILS NFR BLD AUTO: 0.1 % (ref 0–1.5)
BENZODIAZ UR QL SCN: NEGATIVE
BILIRUB SERPL-MCNC: 2.3 MG/DL (ref 0–1.2)
BILIRUB UR QL STRIP: NEGATIVE
BUN SERPL-MCNC: 9 MG/DL (ref 8–23)
BUN SERPL-MCNC: 9 MG/DL (ref 8–23)
BUN/CREAT SERPL: 9.3 (ref 7–25)
BUN/CREAT SERPL: 9.5 (ref 7–25)
BUPRENORPHINE SERPL-MCNC: NEGATIVE NG/ML
CALCIUM SPEC-SCNC: 8.7 MG/DL (ref 8.6–10.5)
CALCIUM SPEC-SCNC: 8.7 MG/DL (ref 8.6–10.5)
CANNABINOIDS SERPL QL: NEGATIVE
CHLORIDE SERPL-SCNC: 82 MMOL/L (ref 98–107)
CHLORIDE SERPL-SCNC: 84 MMOL/L (ref 98–107)
CLARITY UR: CLEAR
CO2 SERPL-SCNC: 25.8 MMOL/L (ref 22–29)
CO2 SERPL-SCNC: 26 MMOL/L (ref 22–29)
COCAINE UR QL: NEGATIVE
COLOR UR: YELLOW
CORTIS SERPL-MCNC: 17.1 MCG/DL
CREAT SERPL-MCNC: 0.95 MG/DL (ref 0.76–1.27)
CREAT SERPL-MCNC: 0.97 MG/DL (ref 0.76–1.27)
DEPRECATED RDW RBC AUTO: 53.8 FL (ref 37–54)
EGFRCR SERPLBLD CKD-EPI 2021: 80.9 ML/MIN/1.73
EGFRCR SERPLBLD CKD-EPI 2021: 83 ML/MIN/1.73
EOSINOPHIL # BLD AUTO: 0.01 10*3/MM3 (ref 0–0.4)
EOSINOPHIL NFR BLD AUTO: 0.1 % (ref 0.3–6.2)
ERYTHROCYTE [DISTWIDTH] IN BLOOD BY AUTOMATED COUNT: 14.1 % (ref 12.3–15.4)
ETHANOL BLD-MCNC: <10 MG/DL (ref 0–10)
ETHANOL UR QL: <0.01 %
FENTANYL UR-MCNC: NEGATIVE NG/ML
GEN 5 1HR TROPONIN T REFLEX: 25 NG/L
GLOBULIN UR ELPH-MCNC: 2.5 GM/DL
GLUCOSE SERPL-MCNC: 105 MG/DL (ref 65–99)
GLUCOSE SERPL-MCNC: 114 MG/DL (ref 65–99)
GLUCOSE UR STRIP-MCNC: NEGATIVE MG/DL
HCT VFR BLD AUTO: 42 % (ref 37.5–51)
HGB BLD-MCNC: 15.1 G/DL (ref 13–17.7)
HGB UR QL STRIP.AUTO: ABNORMAL
HYALINE CASTS UR QL AUTO: NORMAL /LPF
IMM GRANULOCYTES # BLD AUTO: 0.04 10*3/MM3 (ref 0–0.05)
IMM GRANULOCYTES NFR BLD AUTO: 0.5 % (ref 0–0.5)
KETONES UR QL STRIP: ABNORMAL
LEUKOCYTE ESTERASE UR QL STRIP.AUTO: NEGATIVE
LIPASE SERPL-CCNC: 83 U/L (ref 13–60)
LYMPHOCYTES # BLD AUTO: 0.39 10*3/MM3 (ref 0.7–3.1)
LYMPHOCYTES NFR BLD AUTO: 4.6 % (ref 19.6–45.3)
MAGNESIUM SERPL-MCNC: 2.3 MG/DL (ref 1.6–2.4)
MAGNESIUM SERPL-MCNC: 2.3 MG/DL (ref 1.6–2.4)
MCH RBC QN AUTO: 37.3 PG (ref 26.6–33)
MCHC RBC AUTO-ENTMCNC: 36 G/DL (ref 31.5–35.7)
MCV RBC AUTO: 103.7 FL (ref 79–97)
METHADONE UR QL SCN: NEGATIVE
MONOCYTES # BLD AUTO: 1.17 10*3/MM3 (ref 0.1–0.9)
MONOCYTES NFR BLD AUTO: 13.9 % (ref 5–12)
NEUTROPHILS NFR BLD AUTO: 6.8 10*3/MM3 (ref 1.7–7)
NEUTROPHILS NFR BLD AUTO: 80.8 % (ref 42.7–76)
NITRITE UR QL STRIP: NEGATIVE
NRBC BLD AUTO-RTO: 0 /100 WBC (ref 0–0.2)
OPIATES UR QL: NEGATIVE
OSMOLALITY SERPL: 290 MOSM/KG (ref 280–301)
OSMOLALITY UR: 499 MOSM/KG
OXYCODONE UR QL SCN: NEGATIVE
PCP UR QL SCN: NEGATIVE
PH UR STRIP.AUTO: 6.5 [PH] (ref 5–8)
PHOSPHATE SERPL-MCNC: 2.2 MG/DL (ref 2.5–4.5)
PLATELET # BLD AUTO: 223 10*3/MM3 (ref 140–450)
PMV BLD AUTO: 9.4 FL (ref 6–12)
POTASSIUM SERPL-SCNC: 2.2 MMOL/L (ref 3.5–5.2)
POTASSIUM SERPL-SCNC: 2.3 MMOL/L (ref 3.5–5.2)
PROT SERPL-MCNC: 6.2 G/DL (ref 6–8.5)
PROT UR QL STRIP: ABNORMAL
QT INTERVAL: 582 MS
QTC INTERVAL: 643 MS
RBC # BLD AUTO: 4.05 10*6/MM3 (ref 4.14–5.8)
RBC # UR STRIP: NORMAL /HPF
REF LAB TEST METHOD: NORMAL
SODIUM SERPL-SCNC: 125 MMOL/L (ref 136–145)
SODIUM SERPL-SCNC: 128 MMOL/L (ref 136–145)
SODIUM UR-SCNC: <20 MMOL/L
SP GR UR STRIP: >1.03 (ref 1–1.03)
SQUAMOUS #/AREA URNS HPF: NORMAL /HPF
TRICYCLICS UR QL SCN: NEGATIVE
TROPONIN T % DELTA: 9
TROPONIN T NUMERIC DELTA: 2 NG/L
TROPONIN T SERPL HS-MCNC: 23 NG/L
TSH SERPL DL<=0.05 MIU/L-ACNC: 0.95 UIU/ML (ref 0.27–4.2)
UROBILINOGEN UR QL STRIP: ABNORMAL
WBC # UR STRIP: NORMAL /HPF
WBC NRBC COR # BLD AUTO: 8.42 10*3/MM3 (ref 3.4–10.8)

## 2025-06-30 PROCEDURE — 93010 ELECTROCARDIOGRAM REPORT: CPT | Performed by: INTERNAL MEDICINE

## 2025-06-30 PROCEDURE — 82077 ASSAY SPEC XCP UR&BREATH IA: CPT | Performed by: HOSPITALIST

## 2025-06-30 PROCEDURE — 25010000002 THIAMINE PER 100 MG: Performed by: HOSPITALIST

## 2025-06-30 PROCEDURE — 25510000001 IOPAMIDOL 61 % SOLUTION: Performed by: EMERGENCY MEDICINE

## 2025-06-30 PROCEDURE — 84484 ASSAY OF TROPONIN QUANT: CPT | Performed by: PHYSICIAN ASSISTANT

## 2025-06-30 PROCEDURE — 83930 ASSAY OF BLOOD OSMOLALITY: CPT | Performed by: HOSPITALIST

## 2025-06-30 PROCEDURE — 85025 COMPLETE CBC W/AUTO DIFF WBC: CPT | Performed by: PHYSICIAN ASSISTANT

## 2025-06-30 PROCEDURE — 83735 ASSAY OF MAGNESIUM: CPT | Performed by: HOSPITALIST

## 2025-06-30 PROCEDURE — 84100 ASSAY OF PHOSPHORUS: CPT | Performed by: HOSPITALIST

## 2025-06-30 PROCEDURE — 25810000003 SODIUM CHLORIDE 0.9 % SOLUTION: Performed by: HOSPITALIST

## 2025-06-30 PROCEDURE — 84300 ASSAY OF URINE SODIUM: CPT | Performed by: HOSPITALIST

## 2025-06-30 PROCEDURE — 80307 DRUG TEST PRSMV CHEM ANLYZR: CPT | Performed by: HOSPITALIST

## 2025-06-30 PROCEDURE — 83690 ASSAY OF LIPASE: CPT | Performed by: PHYSICIAN ASSISTANT

## 2025-06-30 PROCEDURE — 71045 X-RAY EXAM CHEST 1 VIEW: CPT

## 2025-06-30 PROCEDURE — 99285 EMERGENCY DEPT VISIT HI MDM: CPT

## 2025-06-30 PROCEDURE — 84443 ASSAY THYROID STIM HORMONE: CPT | Performed by: HOSPITALIST

## 2025-06-30 PROCEDURE — 70450 CT HEAD/BRAIN W/O DYE: CPT

## 2025-06-30 PROCEDURE — 80053 COMPREHEN METABOLIC PANEL: CPT | Performed by: PHYSICIAN ASSISTANT

## 2025-06-30 PROCEDURE — 36415 COLL VENOUS BLD VENIPUNCTURE: CPT | Performed by: PHYSICIAN ASSISTANT

## 2025-06-30 PROCEDURE — 25810000003 SODIUM CHLORIDE 0.9 % SOLUTION: Performed by: PHYSICIAN ASSISTANT

## 2025-06-30 PROCEDURE — 83735 ASSAY OF MAGNESIUM: CPT | Performed by: PHYSICIAN ASSISTANT

## 2025-06-30 PROCEDURE — 25010000002 POTASSIUM CHLORIDE 10 MEQ/100ML SOLUTION: Performed by: EMERGENCY MEDICINE

## 2025-06-30 PROCEDURE — 74177 CT ABD & PELVIS W/CONTRAST: CPT

## 2025-06-30 PROCEDURE — 93005 ELECTROCARDIOGRAM TRACING: CPT | Performed by: PHYSICIAN ASSISTANT

## 2025-06-30 PROCEDURE — 83935 ASSAY OF URINE OSMOLALITY: CPT | Performed by: HOSPITALIST

## 2025-06-30 PROCEDURE — 82533 TOTAL CORTISOL: CPT | Performed by: HOSPITALIST

## 2025-06-30 PROCEDURE — 81001 URINALYSIS AUTO W/SCOPE: CPT | Performed by: PHYSICIAN ASSISTANT

## 2025-06-30 RX ORDER — THIAMINE HYDROCHLORIDE 100 MG/ML
200 INJECTION, SOLUTION INTRAMUSCULAR; INTRAVENOUS EVERY 8 HOURS SCHEDULED
Status: DISCONTINUED | OUTPATIENT
Start: 2025-06-30 | End: 2025-07-03 | Stop reason: HOSPADM

## 2025-06-30 RX ORDER — PANTOPRAZOLE SODIUM 40 MG/1
40 TABLET, DELAYED RELEASE ORAL
Status: DISCONTINUED | OUTPATIENT
Start: 2025-07-01 | End: 2025-07-03 | Stop reason: HOSPADM

## 2025-06-30 RX ORDER — LORAZEPAM 2 MG/ML
2 INJECTION INTRAMUSCULAR
Status: DISCONTINUED | OUTPATIENT
Start: 2025-06-30 | End: 2025-07-03 | Stop reason: HOSPADM

## 2025-06-30 RX ORDER — SODIUM CHLORIDE 0.9 % (FLUSH) 0.9 %
10 SYRINGE (ML) INJECTION EVERY 12 HOURS SCHEDULED
Status: DISCONTINUED | OUTPATIENT
Start: 2025-06-30 | End: 2025-07-03 | Stop reason: HOSPADM

## 2025-06-30 RX ORDER — BISACODYL 10 MG
10 SUPPOSITORY, RECTAL RECTAL DAILY PRN
Status: DISCONTINUED | OUTPATIENT
Start: 2025-06-30 | End: 2025-07-03 | Stop reason: HOSPADM

## 2025-06-30 RX ORDER — FOLIC ACID 1 MG/1
1 TABLET ORAL DAILY
Status: DISCONTINUED | OUTPATIENT
Start: 2025-06-30 | End: 2025-07-03 | Stop reason: HOSPADM

## 2025-06-30 RX ORDER — LORAZEPAM 2 MG/ML
1 INJECTION INTRAMUSCULAR
Status: DISCONTINUED | OUTPATIENT
Start: 2025-06-30 | End: 2025-07-03 | Stop reason: HOSPADM

## 2025-06-30 RX ORDER — AMOXICILLIN 250 MG
2 CAPSULE ORAL 2 TIMES DAILY PRN
Status: DISCONTINUED | OUTPATIENT
Start: 2025-06-30 | End: 2025-07-03 | Stop reason: HOSPADM

## 2025-06-30 RX ORDER — LORAZEPAM 1 MG/1
1 TABLET ORAL
Status: DISCONTINUED | OUTPATIENT
Start: 2025-06-30 | End: 2025-07-03 | Stop reason: HOSPADM

## 2025-06-30 RX ORDER — IOPAMIDOL 612 MG/ML
100 INJECTION, SOLUTION INTRAVASCULAR
Status: COMPLETED | OUTPATIENT
Start: 2025-06-30 | End: 2025-06-30

## 2025-06-30 RX ORDER — POLYETHYLENE GLYCOL 3350 17 G/17G
17 POWDER, FOR SOLUTION ORAL DAILY PRN
Status: DISCONTINUED | OUTPATIENT
Start: 2025-06-30 | End: 2025-07-03 | Stop reason: HOSPADM

## 2025-06-30 RX ORDER — SODIUM CHLORIDE 9 MG/ML
40 INJECTION, SOLUTION INTRAVENOUS AS NEEDED
Status: DISCONTINUED | OUTPATIENT
Start: 2025-06-30 | End: 2025-07-03 | Stop reason: HOSPADM

## 2025-06-30 RX ORDER — SODIUM CHLORIDE 0.9 % (FLUSH) 0.9 %
10 SYRINGE (ML) INJECTION AS NEEDED
Status: DISCONTINUED | OUTPATIENT
Start: 2025-06-30 | End: 2025-07-03 | Stop reason: HOSPADM

## 2025-06-30 RX ORDER — LORAZEPAM 1 MG/1
2 TABLET ORAL
Status: DISCONTINUED | OUTPATIENT
Start: 2025-06-30 | End: 2025-07-03 | Stop reason: HOSPADM

## 2025-06-30 RX ORDER — BISACODYL 5 MG/1
5 TABLET, DELAYED RELEASE ORAL DAILY PRN
Status: DISCONTINUED | OUTPATIENT
Start: 2025-06-30 | End: 2025-07-03 | Stop reason: HOSPADM

## 2025-06-30 RX ORDER — SODIUM CHLORIDE 9 MG/ML
100 INJECTION, SOLUTION INTRAVENOUS CONTINUOUS
Status: DISCONTINUED | OUTPATIENT
Start: 2025-06-30 | End: 2025-07-01

## 2025-06-30 RX ORDER — POTASSIUM CHLORIDE 1500 MG/1
40 TABLET, EXTENDED RELEASE ORAL
Status: DISPENSED | OUTPATIENT
Start: 2025-06-30 | End: 2025-06-30

## 2025-06-30 RX ORDER — CLOPIDOGREL BISULFATE 75 MG/1
75 TABLET ORAL DAILY
COMMUNITY

## 2025-06-30 RX ORDER — ONDANSETRON 2 MG/ML
4 INJECTION INTRAMUSCULAR; INTRAVENOUS EVERY 6 HOURS PRN
Status: DISCONTINUED | OUTPATIENT
Start: 2025-06-30 | End: 2025-07-03 | Stop reason: HOSPADM

## 2025-06-30 RX ORDER — POTASSIUM CHLORIDE 7.45 MG/ML
10 INJECTION INTRAVENOUS
Status: COMPLETED | OUTPATIENT
Start: 2025-06-30 | End: 2025-06-30

## 2025-06-30 RX ADMIN — THIAMINE HYDROCHLORIDE 200 MG: 100 INJECTION, SOLUTION INTRAMUSCULAR; INTRAVENOUS at 21:11

## 2025-06-30 RX ADMIN — POTASSIUM CHLORIDE 10 MEQ: 7.46 INJECTION, SOLUTION INTRAVENOUS at 15:33

## 2025-06-30 RX ADMIN — Medication 10 ML: at 21:00

## 2025-06-30 RX ADMIN — Medication 10 ML: at 15:25

## 2025-06-30 RX ADMIN — POTASSIUM CHLORIDE 10 MEQ: 7.46 INJECTION, SOLUTION INTRAVENOUS at 12:48

## 2025-06-30 RX ADMIN — POTASSIUM CHLORIDE 40 MEQ: 1500 TABLET, EXTENDED RELEASE ORAL at 15:23

## 2025-06-30 RX ADMIN — IOPAMIDOL 85 ML: 612 INJECTION, SOLUTION INTRAVENOUS at 10:30

## 2025-06-30 RX ADMIN — SODIUM CHLORIDE 100 ML/HR: 9 INJECTION, SOLUTION INTRAVENOUS at 20:58

## 2025-06-30 RX ADMIN — SODIUM CHLORIDE 100 ML/HR: 9 INJECTION, SOLUTION INTRAVENOUS at 15:28

## 2025-06-30 RX ADMIN — SODIUM CHLORIDE 1000 ML: 9 INJECTION, SOLUTION INTRAVENOUS at 11:07

## 2025-06-30 RX ADMIN — FOLIC ACID 1 MG: 1 TABLET ORAL at 16:52

## 2025-06-30 RX ADMIN — THIAMINE HYDROCHLORIDE 200 MG: 100 INJECTION, SOLUTION INTRAMUSCULAR; INTRAVENOUS at 16:52

## 2025-06-30 NOTE — ED PROVIDER NOTES
EMERGENCY DEPARTMENT ENCOUNTER    Room Number:  05/05  Date of encounter:  6/30/2025  PCP: John Fischer MD  Historian: Patient, family  Chronic or social conditions impacting care (social determinants of health): Full code from home    HPI:  Chief Complaint: Altered mental status, abdominal pain  A complete HPI/ROS/PMH/PSH/SH/FH are unobtainable due to: Nothing    Context: Evan Yoon is a 75 y.o. male with a history of TIA, hypertension who presents to the ED c/o acute altered mental status, abdominal pain.  Patient reports having a mechanical fall 8 days ago and hitting the back of his head on the wall.  Patient's family was out of town at the time.  Since then the patient has been more confused and had trouble walking.  He reports that he feels like he is off balance.  The patient believes that his balance issues go back further than 8 days ago.  The patient denies any chest pain, shortness of breath.  He denies any cardiac history.    In addition patient's family reports that the patient has been having intemittent severe bouts of abdominal pain for the past several weeks.  They report weight loss.  No pain at this time.  No vomiting, diarrhea.    Review of prior external notes (non-ED):   I reviewed primary care office visit from earlier today.  Patient sent to the ER for altered mental status.  Patient was hypotensive there.    Review of prior external test results outside of this encounter:  I reviewed an MRI of the brain dated 10/4/2023.  This did show evolution of an infarct in the left occipital parietal region    PAST MEDICAL HISTORY  Active Ambulatory Problems     Diagnosis Date Noted    Penaloza's esophagus without dysplasia 04/04/2019    Gastroesophageal reflux disease 06/03/2021    Adenomatous polyp of colon 06/03/2021    TIA (transient ischemic attack) 09/24/2023    Asymptomatic stenosis of left carotid artery 09/24/2023    Benign essential hypertension 03/27/2015    History of right-sided  carotid endarterectomy 11/19/2019    Hyperlipidemia 09/24/2023    Stenosis of right subclavian artery 11/24/2020    Acute CVA (cerebrovascular accident) 09/25/2023     Resolved Ambulatory Problems     Diagnosis Date Noted    No Resolved Ambulatory Problems     Past Medical History:   Diagnosis Date    Alvarado esophagus     Ex-smoker     GERD (gastroesophageal reflux disease)     Hypertension     Sleep apnea          PAST SURGICAL HISTORY  Past Surgical History:   Procedure Laterality Date    BACK SURGERY      CAROTID ENDARTERECTOMY Right     COLONOSCOPY N/A 3/7/2017    Procedure: COLONOSCOPY TO CECUM AND TERMINAL ILEUM WITH COLD BIOPSY POLYPECTOMIES;  Surgeon: Dago KEMP MD;  Location: [facility] ENDOSCOPY;  Service:     COLONOSCOPY N/A 7/22/2021    Procedure: COLONOSCOPY TO CECUM/TI;  Surgeon: Dago bAbott MD;  [facility];  Service: Gastroenterology;  Laterality: N/A;  pre: HX OF POLYPS  post: HEMORRHOIDS, DIVERTICULOSIS, OTHERWISE NORMAL TO TI    ENDOSCOPY N/A 3/7/2017    Procedure: ESOPHAGOGASTRODUODENOSCOPY WITH BIOPSIES;  Surgeon: Dago KEMP MD;  Location: [facility] ENDOSCOPY;  Service:     ENDOSCOPY N/A 4/23/2019    Procedure: ESOPHAGOGASTRODUODENOSCOPY with biopsies;  Surgeon: Dago Abbott MD;  Location: [facility] ENDOSCOPY;  Service: Gastroenterology    ENDOSCOPY N/A 7/22/2021    Procedure: ESOPHAGOGASTRODUODENOSCOPY WITH BX'S;  Surgeon: Dago Abbott MD;  Location: [facility] ENDOSCOPY;  Service: Gastroenterology;  Laterality: N/A;  pre: GERD, ALVARADO'S ESOPHAGUS  post: ALVARADO'S ESOPHAGUS, ESOPHAGITIS, HIATAL HERNIA,GASTRITIS    KNEE ARTHROSCOPY Left          FAMILY HISTORY  Family History   Problem Relation Age of Onset    Colon cancer Maternal Aunt          SOCIAL HISTORY  Social History     Socioeconomic History    Marital status:    Tobacco Use    Smoking status: Former     Current packs/day: 0.00     Types: Cigarettes     Quit date: 1990     Years  since quittin.5    Smokeless tobacco: Never   Vaping Use    Vaping status: Never Used   Substance and Sexual Activity    Alcohol use: Yes     Alcohol/week: 3.0 standard drinks of alcohol     Types: 3 Cans of beer per week     Comment: social     Drug use: Never    Sexual activity: Defer         ALLERGIES  Patient has no known allergies.        REVIEW OF SYSTEMS  All systems reviewed and negative except for those discussed in HPI.       PHYSICAL EXAM    I have reviewed the triage vital signs and nursing notes.    ED Triage Vitals [25 0956]   Temp Heart Rate Resp BP SpO2   97.7 °F (36.5 °C) 98 16 -- 98 %      Temp src Heart Rate Source Patient Position BP Location FiO2 (%)   Oral Monitor -- -- --       Physical Exam  GENERAL: Alert, oriented, not distressed  HENT: Healing laceration to the occipital scalp  EYES: no scleral icterus, EOMI  CV: regular rhythm, regular rate, no murmur  RESPIRATORY: normal effort, CTA  ABDOMEN: soft, nontender  MUSCULOSKELETAL: no deformity, FROM, no calf swelling or tenderness  NEURO: alert, normal speech, normal cerebellar function  SKIN: warm, dry        LAB RESULTS  Recent Results (from the past 24 hours)   ECG 12 Lead Altered Mental Status    Collection Time: 25 10:13 AM   Result Value Ref Range    QT Interval 582 ms    QTC Interval 643 ms   Comprehensive Metabolic Panel    Collection Time: 25 10:55 AM    Specimen: Arm, Right; Blood   Result Value Ref Range    Glucose 114 (H) 65 - 99 mg/dL    BUN 9.0 8.0 - 23.0 mg/dL    Creatinine 0.97 0.76 - 1.27 mg/dL    Sodium 125 (L) 136 - 145 mmol/L    Potassium 2.3 (C) 3.5 - 5.2 mmol/L    Chloride 82 (L) 98 - 107 mmol/L    CO2 25.8 22.0 - 29.0 mmol/L    Calcium 8.7 8.6 - 10.5 mg/dL    Total Protein 6.2 6.0 - 8.5 g/dL    Albumin 3.7 3.5 - 5.2 g/dL    ALT (SGPT) 22 1 - 41 U/L    AST (SGOT) 54 (H) 1 - 40 U/L    Alkaline Phosphatase 64 39 - 117 U/L    Total Bilirubin 2.3 (H) 0.0 - 1.2 mg/dL    Globulin 2.5 gm/dL    A/G Ratio  1.5 g/dL    BUN/Creatinine Ratio 9.3 7.0 - 25.0    Anion Gap 17.2 (H) 5.0 - 15.0 mmol/L    eGFR 80.9 >60.0 mL/min/1.73   CBC Auto Differential    Collection Time: 06/30/25 10:55 AM    Specimen: Arm, Right; Blood   Result Value Ref Range    WBC 8.42 3.40 - 10.80 10*3/mm3    RBC 4.05 (L) 4.14 - 5.80 10*6/mm3    Hemoglobin 15.1 13.0 - 17.7 g/dL    Hematocrit 42.0 37.5 - 51.0 %    .7 (H) 79.0 - 97.0 fL    MCH 37.3 (H) 26.6 - 33.0 pg    MCHC 36.0 (H) 31.5 - 35.7 g/dL    RDW 14.1 12.3 - 15.4 %    RDW-SD 53.8 37.0 - 54.0 fl    MPV 9.4 6.0 - 12.0 fL    Platelets 223 140 - 450 10*3/mm3    Neutrophil % 80.8 (H) 42.7 - 76.0 %    Lymphocyte % 4.6 (L) 19.6 - 45.3 %    Monocyte % 13.9 (H) 5.0 - 12.0 %    Eosinophil % 0.1 (L) 0.3 - 6.2 %    Basophil % 0.1 0.0 - 1.5 %    Immature Grans % 0.5 0.0 - 0.5 %    Neutrophils, Absolute 6.80 1.70 - 7.00 10*3/mm3    Lymphocytes, Absolute 0.39 (L) 0.70 - 3.10 10*3/mm3    Monocytes, Absolute 1.17 (H) 0.10 - 0.90 10*3/mm3    Eosinophils, Absolute 0.01 0.00 - 0.40 10*3/mm3    Basophils, Absolute 0.01 0.00 - 0.20 10*3/mm3    Immature Grans, Absolute 0.04 0.00 - 0.05 10*3/mm3    nRBC 0.0 0.0 - 0.2 /100 WBC   Lipase    Collection Time: 06/30/25 11:24 AM    Specimen: Arm, Left; Blood   Result Value Ref Range    Lipase 83 (H) 13 - 60 U/L   High Sensitivity Troponin T    Collection Time: 06/30/25 11:24 AM    Specimen: Arm, Left; Blood   Result Value Ref Range    HS Troponin T 23 (H) <22 ng/L   Magnesium    Collection Time: 06/30/25 11:24 AM    Specimen: Arm, Left; Blood   Result Value Ref Range    Magnesium 2.3 1.6 - 2.4 mg/dL   Urinalysis With Microscopic If Indicated (No Culture) - Urine, Clean Catch    Collection Time: 06/30/25  1:42 PM    Specimen: Urine, Clean Catch   Result Value Ref Range    Color, UA Yellow Yellow, Straw    Appearance, UA Clear Clear    pH, UA 6.5 5.0 - 8.0    Specific Gravity, UA >1.030 (H) 1.005 - 1.030    Glucose, UA Negative Negative    Ketones, UA 80 mg/dL (3+) (A)  Negative    Bilirubin, UA Negative Negative    Blood, UA Small (1+) (A) Negative    Protein,  mg/dL (2+) (A) Negative    Leuk Esterase, UA Negative Negative    Nitrite, UA Negative Negative    Urobilinogen, UA 1.0 E.U./dL 0.2 - 1.0 E.U./dL   Sodium, Urine, Random - Urine, Clean Catch    Collection Time: 06/30/25  1:42 PM    Specimen: Urine, Clean Catch   Result Value Ref Range    Sodium, Urine <20 mmol/L   Osmolality, Urine - Urine, Clean Catch    Collection Time: 06/30/25  1:42 PM    Specimen: Urine, Clean Catch   Result Value Ref Range    Osmolality, Urine 499 mOsm/kg   Urine Drug Screen - Urine, Clean Catch    Collection Time: 06/30/25  1:42 PM    Specimen: Urine, Clean Catch   Result Value Ref Range    THC, Screen, Urine Negative Negative    Phencyclidine (PCP), Urine Negative Negative    Cocaine Screen, Urine Negative Negative    Methamphetamine, Ur Negative Negative    Opiate Screen Negative Negative    Amphetamine Screen, Urine Negative Negative    Benzodiazepine Screen, Urine Negative Negative    Tricyclic Antidepressants Screen Negative Negative    Methadone Screen, Urine Negative Negative    Barbiturates Screen, Urine Negative Negative    Oxycodone Screen, Urine Negative Negative    Buprenorphine, Screen, Urine Negative Negative   Urinalysis, Microscopic Only - Urine, Clean Catch    Collection Time: 06/30/25  1:42 PM    Specimen: Urine, Clean Catch   Result Value Ref Range    RBC, UA 0-2 None Seen, 0-2 /HPF    WBC, UA 0-2 None Seen, 0-2 /HPF    Bacteria, UA None Seen None Seen /HPF    Squamous Epithelial Cells, UA 0-2 None Seen, 0-2 /HPF    Hyaline Casts, UA 7-12 None Seen /LPF    Methodology Automated Microscopy    Fentanyl, Urine - Urine, Clean Catch    Collection Time: 06/30/25  1:42 PM    Specimen: Urine, Clean Catch   Result Value Ref Range    Fentanyl, Urine Negative Negative   High Sensitivity Troponin T 1Hr    Collection Time: 06/30/25  1:48 PM    Specimen: Arm, Right; Blood   Result Value  Ref Range    HS Troponin T 25 (H) <22 ng/L    Troponin T Numeric Delta 2 ng/L    Troponin T % Delta 9 Abnormal if >/= 20%   Magnesium    Collection Time: 06/30/25  1:48 PM    Specimen: Arm, Right; Blood   Result Value Ref Range    Magnesium 2.3 1.6 - 2.4 mg/dL   Phosphorus    Collection Time: 06/30/25  1:48 PM    Specimen: Arm, Right; Blood   Result Value Ref Range    Phosphorus 2.2 (L) 2.5 - 4.5 mg/dL   Basic Metabolic Panel    Collection Time: 06/30/25  1:48 PM    Specimen: Arm, Right; Blood   Result Value Ref Range    Glucose 105 (H) 65 - 99 mg/dL    BUN 9.0 8.0 - 23.0 mg/dL    Creatinine 0.95 0.76 - 1.27 mg/dL    Sodium 128 (L) 136 - 145 mmol/L    Potassium 2.2 (C) 3.5 - 5.2 mmol/L    Chloride 84 (L) 98 - 107 mmol/L    CO2 26.0 22.0 - 29.0 mmol/L    Calcium 8.7 8.6 - 10.5 mg/dL    BUN/Creatinine Ratio 9.5 7.0 - 25.0    Anion Gap 18.0 (H) 5.0 - 15.0 mmol/L    eGFR 83.0 >60.0 mL/min/1.73   Ethanol    Collection Time: 06/30/25  1:48 PM    Specimen: Arm, Right; Blood   Result Value Ref Range    Ethanol <10 0 - 10 mg/dL    Ethanol % <0.010 %   TSH    Collection Time: 06/30/25  1:48 PM    Specimen: Arm, Right; Blood   Result Value Ref Range    TSH 0.955 0.270 - 4.200 uIU/mL   Cortisol    Collection Time: 06/30/25  1:48 PM    Specimen: Arm, Right; Blood   Result Value Ref Range    Cortisol 17.10   mcg/dL       Ordered the above labs and independently reviewed the results.        RADIOLOGY  XR Chest 1 View  Result Date: 6/30/2025  XR CHEST 1 VW-  HISTORY: Male who is 75 years-old, altered mental status  TECHNIQUE: Frontal view of the chest  COMPARISON: 10/3/2023  FINDINGS: The heart size is normal. Aorta is calcified. Pulmonary vasculature is unremarkable. No focal pulmonary consolidation, pleural effusion, or pneumothorax. No acute osseous process.      No focal pulmonary consolidation. Follow-up as clinical indications persist.  This report was finalized on 6/30/2025 11:33 AM by Dr. Souleymane Salter M.D on  Workstation: [id number]      CT Abdomen Pelvis With Contrast  Result Date: 6/30/2025  CT ABDOMEN PELVIS W CONTRAST-  INDICATION: Abdominal pain. Off balance. Confused.  COMPARISON: CT abdomen pelvis April 24, 2025  TECHNIQUE: Routine CT abdomen/pelvis with IV contrast. Coronal and sagittal reformats. Radiation dose reduction techniques were utilized, including automated exposure control and exposure modulation based on body size.  FINDINGS:  Lung bases: Fibrotic changes seen at the lung bases. Calcified pulmonary nodule in the lateral basilar left lower lobe, consistent with prior granulomatous infection. Coronary artery atherosclerotic calcifications. Aortic valve calcification. Lipomatous hypertrophy of the interatrial septum.  ABDOMEN: Fluid attenuating cyst seen in segment 2 of the left hepatic lobe, measures 1.4 cm. Normal gallbladder. No biliary ductal dilatation. Calcifications in the spleen, consistent with prior granulomatous infection. Spleen is normal in size. No pancreatic mass or pancreatic ductal dilatation seen. Truncated pancreatic body and tail. No adrenal nodules. Symmetric perinephric edema. Fluid attenuating cyst off the inferior pole right kidney measuring 3.8 cm. Small cyst in the superior pole left kidney. No solid-appearing renal mass or hydronephrosis.  Pelvis: Prostatomegaly, with mild mass effect on the bladder apex bladder is under distended. No bladder calculus.  Bowel: Incidental duodenal diverticulum. No small bowel obstruction. Colonic diverticulosis. Colon is under distended. Normal appendix.  Abdominal wall: Small fat-containing umbilical hernia. Small fat-containing inguinal hernias.  Retroperitoneum: No lymphadenopathy.  Vasculature: Bulky calcific atherosclerotic disease seen in the proximal SMA and SMA origin, with suspected vessel occlusion, with suspected pancreaticoduodenal collateral vessel reconstitution, unchanged. Severe aortoiliac atherosclerotic calcification. Infrarenal  abdominal aortic atherosclerotic aneurysm, series 2, axial mage 58, measures 3.4 x 2.8 cm, unchanged, with posterior atherosclerotic ulceration, stable. Atherosclerotic stenoses in the proximal common iliac arteries with more distal right common iliac artery fusiform atherosclerotic aneurysm measuring 2.4 cm, unchanged.  Osseous structures: Displaced left posterior 11th rib fracture, series 2, axial mage 44, with 6 mm displacement. Old left lateral rib fractures. Old right posterior 11th rib fracture. Bilateral femoral head osteophyte lipping. Diffuse idiopathic skeletal hyperostosis seen in the thoracic spine. Lower lumbar facet degenerative arthropathy with grade 1 anterolisthesis of L4 on L5.       1. Displaced left posterior 11th rib fracture. 2. No acute intra-abdominal or intrapelvic findings. 3. Colonic diverticulosis. 4. Mild prostatomegaly. 5. Bulky calcific atherosclerotic disease seen in the proximal SMA, with suspected vessel occlusion and recanalization via a pancreaticoduodenal collateral, unchanged. 6. 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 3-year follow-up CTA abdomen/pelvis can reevaluate. 7. Severe aortoiliac atherosclerotic calcification with bilateral proximal common iliac artery atherosclerotic stenoses. 8. Fibrosis/interstitial lung disease seen at the lung bases  This report was finalized on 6/30/2025 11:19 AM by Dr. Dago Quevedo M.D on Workstation: [id number]      CT Head Without Contrast  Result Date: 6/30/2025  CT HEAD WO CONTRAST-  HISTORY:  fall head injury AMS  COMPARISON: CT head 10/4/2023  FINDINGS: The brain ventricles are symmetrical. There is no evidence of intracranial hemorrhage, hydrocephalus or a focal area of decreased attenuation to suggest acute infarction or contusion. Diffuse atrophy, moderate to severe vascular calcification and mild to moderate small vessel ischemic disease is  noted. Further evaluation could be performed with MRI examination of the brain as indicated. Bone windows show no evidence of a calvarial fracture. Small scalp hematoma is appreciated in the parieto-occipital region on the right posteriorly.      Radiation dose reduction techniques were utilized, including automated exposure modulation based on body size.  This report was finalized on 6/30/2025 10:59 AM by Dr. Yung Quintero M.D on Workstation: [id number]        I ordered the above noted radiological studies. Reviewed by me and discussed with radiologist.  See dictation for official radiology interpretation.      MEDICATIONS GIVEN IN ER    Medications   sodium chloride 0.9 % flush 10 mL (has no administration in time range)   Potassium Replacement - Follow Nurse / BPA Driven Protocol (has no administration in time range)   potassium chloride (KLOR-CON M20) CR tablet 40 mEq (40 mEq Oral Given 6/30/25 1523)   potassium chloride 10 mEq in 100 mL IVPB (0 mEq Intravenous Stopped 6/30/25 1403)   sodium chloride 0.9 % flush 10 mL (10 mL Intravenous Given 6/30/25 1525)   sodium chloride 0.9 % flush 10 mL (has no administration in time range)   sodium chloride 0.9 % infusion 40 mL (has no administration in time range)   ondansetron (ZOFRAN) injection 4 mg (has no administration in time range)   Magnesium Standard Dose Replacement - Follow Nurse / BPA Driven Protocol (has no administration in time range)   sodium chloride 0.9 % infusion (has no administration in time range)   sennosides-docusate (PERICOLACE) 8.6-50 MG per tablet 2 tablet (has no administration in time range)     And   polyethylene glycol (MIRALAX) packet 17 g (has no administration in time range)     And   bisacodyl (DULCOLAX) EC tablet 5 mg (has no administration in time range)     And   bisacodyl (DULCOLAX) suppository 10 mg (has no administration in time range)   thiamine (B-1) injection 200 mg (has no administration in time range)     Followed by    thiamine (VITAMIN B-1) tablet 100 mg (has no administration in time range)   folic acid (FOLVITE) tablet 1 mg (has no administration in time range)   LORazepam (ATIVAN) tablet 1 mg (has no administration in time range)     Or   LORazepam (ATIVAN) injection 1 mg (has no administration in time range)     Or   LORazepam (ATIVAN) tablet 2 mg (has no administration in time range)     Or   LORazepam (ATIVAN) injection 2 mg (has no administration in time range)     Or   LORazepam (ATIVAN) injection 2 mg (has no administration in time range)     Or   LORazepam (ATIVAN) injection 2 mg (has no administration in time range)   pantoprazole (PROTONIX) EC tablet 40 mg (has no administration in time range)   sodium chloride 0.9 % bolus 1,000 mL (0 mL Intravenous Stopped 6/30/25 1523)   iopamidol (ISOVUE-300) 61 % injection 100 mL (85 mL Intravenous Given by Other 6/30/25 1030)         ADDITIONAL ORDERS CONSIDERED BUT NOT ORDERED:  Nothing    PROGRESS, DATA ANALYSIS, CONSULTS, AND MEDICAL DECISION MAKING    All labs have been independently interpreted by myself.  All radiology studies have been independently interpreted by myself and discussed with radiologist dictating the report.   EKGs independently interpreted by myself.  Discussion below represents my analysis of pertinent findings related to patient's condition, differential diagnosis, treatment plan and final disposition.    I have discussed case with Dr. Barron, emergency room physician.  He has performed his own bedside examination and agrees with treatment plan.    ED Course as of 06/30/25 1529   Mon Jun 30, 2025   1013 Patient presents from doctor's office with ataxia, altered mental status.  Patient had a fall with head injury 8 days ago.  Nonfocal neuroexam at this time.  Patient not a TMD or TNKase candidate given onset of symptoms and lack of debilitating symptoms.    Family now at bedside states that he has also been having severe bouts of abdominal pain.  They  also report that the patient has been off balance and altered for at least the past 2 weeks. [EE]   1044 CT imaging of the brain independently interpreted myself shows no acute abnormality. [EE]   1110 WBC: 8.42 [EE]   1110 Hemoglobin: 15.1 [EE]   1134 Sodium(!): 125 [EE]   1134 Potassium(!!): 2.3 [EE]   1151 EKG independently interpreted myself.  Time 1013.  Sinus rhythm, 73 bpm.  Normal P/FROYLAN.  QRS normal with normal axis.  No significant ST abnormalities.  Marked QT prolongation.  This is new in comparison to previous EKG from 10/3/2023. [EE]   1235 Patient with multiple electrolyte abnormalities as well as new ataxia in setting of previous stroke.  Plan to admit for further evaluation.  Patient and family in agreement with treatment plan. [EE]   1309 I discussed case with PETER Carlos.  He agrees to admit. [EE]      ED Course User Index  [EE] Grayson Belle PA       AS OF 15:29 EDT VITALS:    BP - 146/75  HR - 68  TEMP - 97.7 °F (36.5 °C) (Oral)  O2 SATS - 97%        DIAGNOSIS  Final diagnoses:   Hyponatremia   Hypokalemia   Ataxia   QT prolongation   Generalized abdominal pain         DISPOSITION  Admitted    Admission was considered but after careful review of the patient's presentation, physical examination, diagnostic results, and response to treatment the patient may be safely discharged with outpatient follow-up.         Dictated utilizing Dragon dictation     Grayson Belle PA  06/30/25 [id number]

## 2025-06-30 NOTE — ED NOTES
"Nursing report ED to floor  Evan Yoon  75 y.o.  male    HPI :  HPI  Stated Reason for Visit: fall, head injury, ams  History Obtained From: patient    Chief Complaint  Chief Complaint   Patient presents with    Altered Mental Status    Fall       Admitting doctor:   Neptali Souza MD    Admitting diagnosis:   The primary encounter diagnosis was Hyponatremia. Diagnoses of Hypokalemia, Ataxia, QT prolongation, and Generalized abdominal pain were also pertinent to this visit.    Code status:   Current Code Status       Date Active Code Status Order ID Comments User Context       Prior            Allergies:   Patient has no known allergies.    Isolation:   No active isolations    Intake and Output  No intake or output data in the 24 hours ending 06/30/25 1316    Weight:       06/30/25  1013   Weight: 101 kg (222 lb)       Most recent vitals:   Vitals:    06/30/25 1000 06/30/25 1013 06/30/25 1100 06/30/25 1200   BP: 160/93  143/79 118/85   Pulse: 76  75 86   Resp:       Temp:       TempSrc:       SpO2: 97%  98% 97%   Weight:  101 kg (222 lb)     Height:  182.9 cm (72\")         Active LDAs/IV Access:   Lines, Drains & Airways       Active LDAs       Name Placement date Placement time Site Days    Peripheral IV 06/30/25 22 G Right Antecubital 06/30/25  --  Antecubital  less than 1                    Labs (abnormal labs have a star):   Labs Reviewed   COMPREHENSIVE METABOLIC PANEL - Abnormal; Notable for the following components:       Result Value    Glucose 114 (*)     Sodium 125 (*)     Potassium 2.3 (*)     Chloride 82 (*)     AST (SGOT) 54 (*)     Total Bilirubin 2.3 (*)     Anion Gap 17.2 (*)     All other components within normal limits    Narrative:     GFR Categories in Chronic Kidney Disease (CKD)              GFR Category          GFR (mL/min/1.73)    Interpretation  G1                    90 or greater        Normal or high (1)  G2                    60-89                Mild decrease (1)  G3a                 "   45-59                Mild to moderate decrease  G3b                   30-44                Moderate to severe decrease  G4                    15-29                Severe decrease  G5                    14 or less           Kidney failure    (1)In the absence of evidence of kidney disease, neither GFR category G1 or G2 fulfill the criteria for CKD.    eGFR calculation 2021 CKD-EPI creatinine equation, which does not include race as a factor   CBC WITH AUTO DIFFERENTIAL - Abnormal; Notable for the following components:    RBC 4.05 (*)     .7 (*)     MCH 37.3 (*)     MCHC 36.0 (*)     Neutrophil % 80.8 (*)     Lymphocyte % 4.6 (*)     Monocyte % 13.9 (*)     Eosinophil % 0.1 (*)     Lymphocytes, Absolute 0.39 (*)     Monocytes, Absolute 1.17 (*)     All other components within normal limits   LIPASE - Abnormal; Notable for the following components:    Lipase 83 (*)     All other components within normal limits   TROPONIN - Abnormal; Notable for the following components:    HS Troponin T 23 (*)     All other components within normal limits    Narrative:     High Sensitive Troponin T Reference Range:  <14.0 ng/L- Negative Female for AMI  <22.0 ng/L- Negative Male for AMI  >=14 - Abnormal Female indicating possible myocardial injury.  >=22 - Abnormal Male indicating possible myocardial injury.   Clinicians would have to utilize clinical acumen, EKG, Troponin, and serial changes to determine if it is an Acute Myocardial Infarction or myocardial injury due to an underlying chronic condition.        MAGNESIUM - Normal   URINALYSIS W/ MICROSCOPIC IF INDICATED (NO CULTURE)   HIGH SENSITIVITIY TROPONIN T 1HR   CBC AND DIFFERENTIAL    Narrative:     The following orders were created for panel order CBC & Differential.  Procedure                               Abnormality         Status                     ---------                               -----------         ------                     CBC Auto  Differential[[id number]]        Abnormal            Final result                 Please view results for these tests on the individual orders.       EKG:   ECG 12 Lead Altered Mental Status   Preliminary Result   HEART RATE=73  bpm   RR Gksumqri=752  ms   LA Ikofkgzb=728  ms   P Horizontal Axis=24  deg   P Front Axis=91  deg   QRSD Itktlezv=744  ms   QT Sppitbir=148  ms   PEeD=740  ms   QRS Axis=-58  deg   T Wave Axis=63  deg   - ABNORMAL ECG -   Sinus rhythm   Nonspecific IVCD with LAD   Prolonged QT interval   Date and Time of Study:2025-06-30 10:13:05          Meds given in ED:   Medications   sodium chloride 0.9 % flush 10 mL (has no administration in time range)   Potassium Replacement - Follow Nurse / BPA Driven Protocol (has no administration in time range)   potassium chloride (KLOR-CON M20) CR tablet 40 mEq (has no administration in time range)   potassium chloride 10 mEq in 100 mL IVPB (10 mEq Intravenous New Bag 6/30/25 1248)   sodium chloride 0.9 % bolus 1,000 mL (1,000 mL Intravenous New Bag 6/30/25 1107)   iopamidol (ISOVUE-300) 61 % injection 100 mL (85 mL Intravenous Given by Other 6/30/25 1030)       Imaging results:  XR Chest 1 View  Result Date: 6/30/2025  No focal pulmonary consolidation. Follow-up as clinical indications persist.  This report was finalized on 6/30/2025 11:33 AM by Dr. Souleymane Salter M.D on Workstation: [id number]      CT Abdomen Pelvis With Contrast  Result Date: 6/30/2025   1. Displaced left posterior 11th rib fracture. 2. No acute intra-abdominal or intrapelvic findings. 3. Colonic diverticulosis. 4. Mild prostatomegaly. 5. Bulky calcific atherosclerotic disease seen in the proximal SMA, with suspected vessel occlusion and recanalization via a pancreaticoduodenal collateral, unchanged. 6. 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 3-year follow-up CTA abdomen/pelvis can reevaluate. 7. Severe  "aortoiliac atherosclerotic calcification with bilateral proximal common iliac artery atherosclerotic stenoses. 8. Fibrosis/interstitial lung disease seen at the lung bases  This report was finalized on 2025 11:19 AM by Dr. Dago Quevedo M.D on Workstation: [id number]        Ambulatory status:   - ad genevieve    Social issues:   Social History     Socioeconomic History    Marital status:    Tobacco Use    Smoking status: Former     Current packs/day: 0.00     Types: Cigarettes     Quit date:      Years since quittin.5    Smokeless tobacco: Never   Vaping Use    Vaping status: Never Used   Substance and Sexual Activity    Alcohol use: Yes     Alcohol/week: 3.0 standard drinks of alcohol     Types: 3 Cans of beer per week     Comment: social     Drug use: Never    Sexual activity: Defer       Peripheral Neurovascular  Peripheral Neurovascular (Adult)  Peripheral Neurovascular WDL: WDL    Neuro Cognitive  Neuro Cognitive (Adult)  Cognitive/Neuro/Behavioral WDL: .WDL except, all  Level of Consciousness: Alert  Orientation: oriented x 4  Speech: clear, spontaneous, logical  Mood/Behavior: cooperative, calm (patient states he just feels \"dislocated from himself\")  Last Known Well Date: 25  Last Known Well Time: 2100  Pupils  Pupil PERRLA: yes  Sedation Group  POSS (Pasero Opioid-Induced Sed Scale): 1 - Awake and alert    Learning       Respiratory  Respiratory WDL  Respiratory WDL: WDL    Abdominal Pain       Pain Assessments  Pain (Adult)  (0-10) Pain Rating: Rest: 3  (0-10) Pain Rating: Activity: 3  Pain Location: hip, back, head  Pain Side/Orientation: generalized    NIH Stroke Scale       Sheng Costello RN  25 13:16 EDT   "

## 2025-06-30 NOTE — H&P
"Patient Identification:  Name: Evan Yoon  Age: 75 y.o.  Sex: male  :  1950  MRN: [id number]         Primary Care Physician: John Fischer MD    Chief Complaint   Patient presents with    Altered Mental Status    Fall     Subjective   Patient is a 75 y.o. male with a history of stroke about 1-1/2 years ago brought in by family because of falls and abdominal pain. They have been out of town and when they came back he had a head injury. He has also had some falls and problems walking. He tells me he is lightheaded when he gets up. Not really room spinning but just dizziness. No chest pain or shortness of breath. He also has had abdominal pain nausea and vomiting and diarrhea.    He has a history of carotid endarterectomy, stroke 2023 left parietal occipital, left vertebral artery stenosis, hypertension, JULIOCESAR. Family members reported to staff he is a \"functioning alcoholic\", Penaloza's esophagus, hypertension. Family feels he has lost some weight. He is overdue for colonoscopy. He noted rectal bleeding a few weeks ago but that stopped.    Past Medical History:   Diagnosis Date    Penaloza esophagus     Ex-smoker     GERD (gastroesophageal reflux disease)     Hypertension     Sleep apnea     uses CPAP     Past Surgical History:   Procedure Laterality Date    BACK SURGERY      CAROTID ENDARTERECTOMY Right     COLONOSCOPY N/A 3/7/2017    Procedure: COLONOSCOPY TO CECUM AND TERMINAL ILEUM WITH COLD BIOPSY POLYPECTOMIES;  Surgeon: Dago KEMP MD;  Location: [facility] ENDOSCOPY;  Service:     COLONOSCOPY N/A 2021    Procedure: COLONOSCOPY TO CECUM/TI;  Surgeon: Dago Abbott MD;  Location: [facility] ENDOSCOPY;  Service: Gastroenterology;  Laterality: N/A;  pre: HX OF POLYPS  post: HEMORRHOIDS, DIVERTICULOSIS, OTHERWISE NORMAL TO TI    ENDOSCOPY N/A 3/7/2017    Procedure: ESOPHAGOGASTRODUODENOSCOPY WITH BIOPSIES;  Surgeon: Dago KEMP MD;  Location: [facility] ENDOSCOPY;  Service:     " ENDOSCOPY N/A 2019    Procedure: ESOPHAGOGASTRODUODENOSCOPY with biopsies;  Surgeon: Dago Abbott MD;  Location:  [facility] ENDOSCOPY;  Service: Gastroenterology    ENDOSCOPY N/A 2021    Procedure: ESOPHAGOGASTRODUODENOSCOPY WITH BX'S;  Surgeon: Dago Abbott MD;  Location:  [facility] ENDOSCOPY;  Service: Gastroenterology;  Laterality: N/A;  pre: GERD, ALVARADO'S ESOPHAGUS  post: ALVARADO'S ESOPHAGUS, ESOPHAGITIS, HIATAL HERNIA,GASTRITIS    KNEE ARTHROSCOPY Left      Documented medications reviewed    Family History   Problem Relation Age of Onset    Colon cancer Maternal Aunt      Social History     Tobacco Use    Smoking status: Former     Current packs/day: 0.00     Types: Cigarettes     Quit date:      Years since quittin.5    Smokeless tobacco: Never   Vaping Use    Vaping status: Never Used   Substance Use Topics    Alcohol use: Yes     Alcohol/week: 3.0 standard drinks of alcohol     Types: 3 Cans of beer per week     Comment: social     Drug use: Never     Objective      Vital Signs  Temp:  [97.7 °F (36.5 °C)] 97.7 °F (36.5 °C)  Heart Rate:  [] 60  Resp:  [16] 16  BP: ()/(50-93) 86/50  Body mass index is 30.11 kg/m².    Physical Exam  Constitutional:       General: He is not in acute distress.     Appearance: He is not toxic-appearing.   HENT:      Head: Normocephalic.   Cardiovascular:      Rate and Rhythm: Normal rate and regular rhythm.   Pulmonary:      Effort: Pulmonary effort is normal. No respiratory distress.      Breath sounds: Normal breath sounds. No wheezing or rhonchi.   Abdominal:      General: Bowel sounds are normal.      Palpations: Abdomen is soft.      Tenderness: There is no abdominal tenderness. There is no guarding or rebound.   Musculoskeletal:         General: No swelling.   Skin:     General: Skin is warm and dry.   Neurological:      Mental Status: He is alert and oriented to person, place, and time.   Psychiatric:         Mood and Affect: Mood  normal.         Behavior: Behavior normal.         Assessment & Plan   Active Hospital Problems    Diagnosis  POA    **Ataxia [R27.0]  Yes    Left rib fracture [S22.32XA]  Unknown    History of CVA (cerebrovascular accident) [Z86.73]  Not Applicable    PVD (peripheral vascular disease) [I73.9]  Unknown    Diarrhea [R19.7]  Unknown    Hypopotassemia [E87.6]  Unknown    Hyponatremia [E87.1]  Unknown    Prolonged QT interval [R94.31]  Unknown    Alcohol use disorder [F10.90]  Unknown    Gastroesophageal reflux disease [K21.9]  Yes    Stenosis of right subclavian artery [I77.1]  Yes    Benign essential hypertension [I10]  Yes      Resolved Hospital Problems   No resolved problems to display.     75 y.o. male     Falls dizziness  Left posterior 11th rib fracture  History of stroke  CT head no hemorrhage or acute infarct or contusion  Check MRI brain, orthostatics- Consider neurology consult  Therapies  Continue aspirin and Plavix. Statin also as long as LFTs not too high    Abdominal pain  Nausea vomiting diarrhea  History of polyps  Weight loss  LFT elevation  Reported rectal bleeding recently (has stopped and Hgb OK)  Check stool PCR  Lipase elevated possible pancreatitis causing symptoms  GI consult for rectal bleeding and weight loss  Is mild elevated AST in the past  Continue PPI    Hypokalemia, prolonged QT (new from low K)  Hyponatremia  May be hypovolemic which could also be causing dizziness. Could be EtOH (see below)  Check orthostatics continue fluids  Recheck QTc after correction of electrolytes  Hyponatremia labs, TSH, cortisol (abdominal pain)  Check magnesium, Phos  Nephrology consult  Monitor on telemetry    PVD  Hypertension  On aspirin and Plavix, Norvasc and ACE inhibitor  Severe calcification seen on CT scans  3.4 cm infrarenal abdominal aortic aneurysm  2.4 cm right common iliac artery fusiform aneurysm    Reported alcohol use disorder per family  Could be contributing to hyponatremia and falls  MCV  is also elevated  If he is having maybe a mild pancreatitis could be from alcohol  Continue fluids and diet as tolerated  Monitor LFTs  CIWA protocol and as needed Ativan  Check alcohol level (add on)    Lung fibrosis seen on imaging currently on room air    [] Reconcile medications once PTA list complete.    Discussed with patient, family, and nursing staff and Grayson Belle, ED provider.    Neptali Souza MD  [facility]  06/30/25 13:54 EDT

## 2025-06-30 NOTE — ED TRIAGE NOTES
Pt to ed from PCP due to AMS after a fall two days ago. Pt did hit his head and is on a blood thinner. {Pt stated some confusion after fall. Pt was hypotensive at doctors office. EMS stated bp was WDL.    Pt stated mild L hip pain     Pt able to answer questions appropriately at triage.

## 2025-06-30 NOTE — ED PROVIDER NOTES
"SHARED VISIT: This visit was performed by BOTH a physician and an APC. The substantive portion of the medical decision making was performed by this attesting physician who made or approved the management plan and takes responsibility for patient management. All studies in the APC note (if performed) were independently interpreted by me.     The ANDREW and I have discussed this patient's history, physical exam, and treatment plan.  I have reviewed the documentation and personally had a face to face interaction with the patient. I affirm the documentation and agree with the treatment and plan.  The attached note describes my personal findings.      I provided a substantive portion of the care of the patient.  I personally performed the physical exam in its entirety, and below are my findings.     Brief history of present illness: 75-year-old male who is ultimately here for some increased difficulty with his mentation that seems to have even preceded the fall with head injury last week.  He has also had poor appetite and weight loss with some a.m. nausea developing over the last several months.    Physical exam:    Pulse 98   Temp 97.7 °F (36.5 °C) (Oral)   Resp 16   Ht 182.9 cm (72\")   Wt 101 kg (222 lb)   SpO2 98%   BMI 30.11 kg/m²       Physical Exam   Constitutional: No distress.  Nontoxic  HENT:  Head: Normocephalic.  Evidence of subacute occipital trauma.  No depressed skull fracture appreciated.  Oropharynx: Mucous membranes are moist.   Eyes: . No scleral icterus.   Neck: Normal range of motion. Neck supple.   Cardiovascular: Pink warm and well perfused throughout.    Pulmonary/Chest: No respiratory distress.    Abdominal: Soft. There is no rebound or rigidity.  Benign exam  Musculoskeletal: Moves all extremities equally.    Neurological: Alert and oriented.  Speech fluent and easily intelligible  Skin: Skin is pink, warm, and dry.   Psychiatric: Mood and affect normal.   Nursing note and vitals " reviewed.        MDM:  My differential diagnosis includes but is not limited to cerebral contusion, cervical strain, concussion with LOC, concussion without LOC, contusion, fracture of the skull, orbits or mandible, hematoma, intracranial hemorrhage including subdural, epidural, subarachnoid and intracerebral, laceration and postconcussion syndrome    I have personally reviewed and interpreted the EKG obtained today in the emergency department at 1013 concomitant with treatment.  EKG reveals rhythm/rate -sinus, 75. WI-FROYLAN within normal limits.  QRS-narrow complex ST/T-wave -QT prolongation    RADIOLOGY      Study: Noncontrast CT head  Findings: No large volume intracranial hemorrhage or midline shift appreciated  I independently viewed and interpreted these images contemporaneously with treatment.         Please note that portions of this were completed with a voice recognition program.       Note Disclaimer: At [facility], we believe that sharing information builds trust and better relationships. You are receiving this note because you are receiving care at [facility] or recently visited. It is possible you will see health information before a provider has talked with you about it. This kind of information can be easy to misunderstand. To help you fully understand what it means for your health, we urge you to discuss this note with your provider.     Latrell Barron MD  06/30/25 0039

## 2025-07-01 ENCOUNTER — APPOINTMENT (OUTPATIENT)
Dept: MRI IMAGING | Facility: HOSPITAL | Age: 75
End: 2025-07-01
Payer: MEDICARE

## 2025-07-01 PROBLEM — R68.81 EARLY SATIETY: Status: ACTIVE | Noted: 2025-06-30

## 2025-07-01 LAB
ALBUMIN SERPL-MCNC: 3.4 G/DL (ref 3.5–5.2)
ALP SERPL-CCNC: 55 U/L (ref 39–117)
ALT SERPL W P-5'-P-CCNC: 16 U/L (ref 1–41)
ANION GAP SERPL CALCULATED.3IONS-SCNC: 10.7 MMOL/L (ref 5–15)
ANION GAP SERPL CALCULATED.3IONS-SCNC: 16 MMOL/L (ref 5–15)
ANION GAP SERPL CALCULATED.3IONS-SCNC: 16 MMOL/L (ref 5–15)
AST SERPL-CCNC: 45 U/L (ref 1–40)
BASOPHILS # BLD AUTO: 0.01 10*3/MM3 (ref 0–0.2)
BASOPHILS NFR BLD AUTO: 0.2 % (ref 0–1.5)
BILIRUB CONJ SERPL-MCNC: 0.8 MG/DL (ref 0–0.3)
BILIRUB INDIRECT SERPL-MCNC: 1.1 MG/DL
BILIRUB SERPL-MCNC: 1.9 MG/DL (ref 0–1.2)
BUN SERPL-MCNC: 6 MG/DL (ref 8–23)
BUN SERPL-MCNC: 6 MG/DL (ref 8–23)
BUN SERPL-MCNC: 7 MG/DL (ref 8–23)
BUN/CREAT SERPL: 7.5 (ref 7–25)
BUN/CREAT SERPL: 8.2 (ref 7–25)
BUN/CREAT SERPL: 8.6 (ref 7–25)
CALCIUM SPEC-SCNC: 8.3 MG/DL (ref 8.6–10.5)
CALCIUM SPEC-SCNC: 8.4 MG/DL (ref 8.6–10.5)
CALCIUM SPEC-SCNC: 8.6 MG/DL (ref 8.6–10.5)
CHLORIDE SERPL-SCNC: 90 MMOL/L (ref 98–107)
CHLORIDE SERPL-SCNC: 92 MMOL/L (ref 98–107)
CHLORIDE SERPL-SCNC: 93 MMOL/L (ref 98–107)
CO2 SERPL-SCNC: 25 MMOL/L (ref 22–29)
CO2 SERPL-SCNC: 26 MMOL/L (ref 22–29)
CO2 SERPL-SCNC: 29.3 MMOL/L (ref 22–29)
CREAT SERPL-MCNC: 0.73 MG/DL (ref 0.76–1.27)
CREAT SERPL-MCNC: 0.8 MG/DL (ref 0.76–1.27)
CREAT SERPL-MCNC: 0.81 MG/DL (ref 0.76–1.27)
DEPRECATED RDW RBC AUTO: 48.8 FL (ref 37–54)
EGFRCR SERPLBLD CKD-EPI 2021: 91.4 ML/MIN/1.73
EGFRCR SERPLBLD CKD-EPI 2021: 91.7 ML/MIN/1.73
EGFRCR SERPLBLD CKD-EPI 2021: 94.3 ML/MIN/1.73
EOSINOPHIL # BLD AUTO: 0.09 10*3/MM3 (ref 0–0.4)
EOSINOPHIL NFR BLD AUTO: 1.9 % (ref 0.3–6.2)
ERYTHROCYTE [DISTWIDTH] IN BLOOD BY AUTOMATED COUNT: 13.3 % (ref 12.3–15.4)
FOLATE SERPL-MCNC: 6.84 NG/ML (ref 4.78–24.2)
GLUCOSE SERPL-MCNC: 84 MG/DL (ref 65–99)
GLUCOSE SERPL-MCNC: 95 MG/DL (ref 65–99)
GLUCOSE SERPL-MCNC: 97 MG/DL (ref 65–99)
HCT VFR BLD AUTO: 36.9 % (ref 37.5–51)
HGB BLD-MCNC: 13.5 G/DL (ref 13–17.7)
IMM GRANULOCYTES # BLD AUTO: 0.02 10*3/MM3 (ref 0–0.05)
IMM GRANULOCYTES NFR BLD AUTO: 0.4 % (ref 0–0.5)
LIPASE SERPL-CCNC: 65 U/L (ref 13–60)
LYMPHOCYTES # BLD AUTO: 0.37 10*3/MM3 (ref 0.7–3.1)
LYMPHOCYTES NFR BLD AUTO: 7.9 % (ref 19.6–45.3)
MAGNESIUM SERPL-MCNC: 2.2 MG/DL (ref 1.6–2.4)
MCH RBC QN AUTO: 36.9 PG (ref 26.6–33)
MCHC RBC AUTO-ENTMCNC: 36.6 G/DL (ref 31.5–35.7)
MCV RBC AUTO: 100.8 FL (ref 79–97)
MONOCYTES # BLD AUTO: 0.63 10*3/MM3 (ref 0.1–0.9)
MONOCYTES NFR BLD AUTO: 13.5 % (ref 5–12)
NEUTROPHILS NFR BLD AUTO: 3.54 10*3/MM3 (ref 1.7–7)
NEUTROPHILS NFR BLD AUTO: 76.1 % (ref 42.7–76)
NRBC BLD AUTO-RTO: 0 /100 WBC (ref 0–0.2)
PHOSPHATE SERPL-MCNC: 1.8 MG/DL (ref 2.5–4.5)
PHOSPHATE SERPL-MCNC: 1.9 MG/DL (ref 2.5–4.5)
PLATELET # BLD AUTO: 165 10*3/MM3 (ref 140–450)
PMV BLD AUTO: 9.2 FL (ref 6–12)
POTASSIUM SERPL-SCNC: 2.1 MMOL/L (ref 3.5–5.2)
POTASSIUM SERPL-SCNC: 2.4 MMOL/L (ref 3.5–5.2)
POTASSIUM SERPL-SCNC: 3 MMOL/L (ref 3.5–5.2)
PROT SERPL-MCNC: 5.8 G/DL (ref 6–8.5)
QT INTERVAL: 581 MS
QTC INTERVAL: 655 MS
RBC # BLD AUTO: 3.66 10*6/MM3 (ref 4.14–5.8)
SODIUM SERPL-SCNC: 132 MMOL/L (ref 136–145)
SODIUM SERPL-SCNC: 133 MMOL/L (ref 136–145)
SODIUM SERPL-SCNC: 133 MMOL/L (ref 136–145)
VIT B12 BLD-MCNC: 333 PG/ML (ref 211–946)
WBC NRBC COR # BLD AUTO: 4.66 10*3/MM3 (ref 3.4–10.8)

## 2025-07-01 PROCEDURE — 97161 PT EVAL LOW COMPLEX 20 MIN: CPT

## 2025-07-01 PROCEDURE — 84100 ASSAY OF PHOSPHORUS: CPT | Performed by: HOSPITALIST

## 2025-07-01 PROCEDURE — 25010000002 POTASSIUM CHLORIDE 10 MEQ/100ML SOLUTION: Performed by: HOSPITALIST

## 2025-07-01 PROCEDURE — 82746 ASSAY OF FOLIC ACID SERUM: CPT | Performed by: HOSPITALIST

## 2025-07-01 PROCEDURE — 97166 OT EVAL MOD COMPLEX 45 MIN: CPT

## 2025-07-01 PROCEDURE — 93010 ELECTROCARDIOGRAM REPORT: CPT | Performed by: INTERNAL MEDICINE

## 2025-07-01 PROCEDURE — 83690 ASSAY OF LIPASE: CPT | Performed by: HOSPITALIST

## 2025-07-01 PROCEDURE — 25010000002 THIAMINE PER 100 MG: Performed by: HOSPITALIST

## 2025-07-01 PROCEDURE — A9577 INJ MULTIHANCE: HCPCS | Performed by: HOSPITALIST

## 2025-07-01 PROCEDURE — 93005 ELECTROCARDIOGRAM TRACING: CPT | Performed by: HOSPITALIST

## 2025-07-01 PROCEDURE — 36415 COLL VENOUS BLD VENIPUNCTURE: CPT | Performed by: HOSPITALIST

## 2025-07-01 PROCEDURE — 70553 MRI BRAIN STEM W/O & W/DYE: CPT

## 2025-07-01 PROCEDURE — 82607 VITAMIN B-12: CPT | Performed by: HOSPITALIST

## 2025-07-01 PROCEDURE — 83735 ASSAY OF MAGNESIUM: CPT | Performed by: HOSPITALIST

## 2025-07-01 PROCEDURE — 25510000002 GADOBENATE DIMEGLUMINE 529 MG/ML SOLUTION: Performed by: HOSPITALIST

## 2025-07-01 PROCEDURE — 85025 COMPLETE CBC W/AUTO DIFF WBC: CPT | Performed by: HOSPITALIST

## 2025-07-01 PROCEDURE — 80048 BASIC METABOLIC PNL TOTAL CA: CPT | Performed by: HOSPITALIST

## 2025-07-01 PROCEDURE — 25810000003 SODIUM CHLORIDE 0.9 % SOLUTION: Performed by: HOSPITALIST

## 2025-07-01 PROCEDURE — 80076 HEPATIC FUNCTION PANEL: CPT | Performed by: HOSPITALIST

## 2025-07-01 PROCEDURE — 99222 1ST HOSP IP/OBS MODERATE 55: CPT | Performed by: INTERNAL MEDICINE

## 2025-07-01 RX ORDER — SODIUM CHLORIDE 9 MG/ML
75 INJECTION, SOLUTION INTRAVENOUS CONTINUOUS
Status: DISCONTINUED | OUTPATIENT
Start: 2025-07-01 | End: 2025-07-02

## 2025-07-01 RX ORDER — ASPIRIN 81 MG/1
81 TABLET, CHEWABLE ORAL DAILY
Status: DISCONTINUED | OUTPATIENT
Start: 2025-07-01 | End: 2025-07-03 | Stop reason: HOSPADM

## 2025-07-01 RX ORDER — CLOPIDOGREL BISULFATE 75 MG/1
75 TABLET ORAL DAILY
Status: DISCONTINUED | OUTPATIENT
Start: 2025-07-01 | End: 2025-07-03 | Stop reason: HOSPADM

## 2025-07-01 RX ORDER — MULTIVITAMIN WITH IRON
1000 TABLET ORAL DAILY
Status: DISCONTINUED | OUTPATIENT
Start: 2025-07-01 | End: 2025-07-03 | Stop reason: HOSPADM

## 2025-07-01 RX ORDER — ROSUVASTATIN CALCIUM 20 MG/1
40 TABLET, COATED ORAL DAILY
Status: DISCONTINUED | OUTPATIENT
Start: 2025-07-01 | End: 2025-07-03 | Stop reason: HOSPADM

## 2025-07-01 RX ORDER — ACETAMINOPHEN 325 MG/1
650 TABLET ORAL EVERY 6 HOURS PRN
Status: DISCONTINUED | OUTPATIENT
Start: 2025-07-01 | End: 2025-07-03 | Stop reason: HOSPADM

## 2025-07-01 RX ORDER — POTASSIUM CHLORIDE 7.45 MG/ML
10 INJECTION INTRAVENOUS
Status: COMPLETED | OUTPATIENT
Start: 2025-07-01 | End: 2025-07-01

## 2025-07-01 RX ORDER — POTASSIUM CHLORIDE 1.5 G/1.58G
40 POWDER, FOR SOLUTION ORAL EVERY 4 HOURS
Status: COMPLETED | OUTPATIENT
Start: 2025-07-01 | End: 2025-07-01

## 2025-07-01 RX ORDER — LISINOPRIL 10 MG/1
10 TABLET ORAL
Status: DISCONTINUED | OUTPATIENT
Start: 2025-07-01 | End: 2025-07-03 | Stop reason: HOSPADM

## 2025-07-01 RX ORDER — ALBUTEROL SULFATE 0.83 MG/ML
2.5 SOLUTION RESPIRATORY (INHALATION) EVERY 6 HOURS PRN
Status: DISCONTINUED | OUTPATIENT
Start: 2025-07-01 | End: 2025-07-03 | Stop reason: HOSPADM

## 2025-07-01 RX ADMIN — FOLIC ACID 1 MG: 1 TABLET ORAL at 08:50

## 2025-07-01 RX ADMIN — PANTOPRAZOLE SODIUM 40 MG: 40 TABLET, DELAYED RELEASE ORAL at 05:47

## 2025-07-01 RX ADMIN — POTASSIUM CHLORIDE 40 MEQ: 1.5 POWDER, FOR SOLUTION ORAL at 17:51

## 2025-07-01 RX ADMIN — THIAMINE HYDROCHLORIDE 200 MG: 100 INJECTION, SOLUTION INTRAMUSCULAR; INTRAVENOUS at 13:08

## 2025-07-01 RX ADMIN — ROSUVASTATIN CALCIUM 40 MG: 20 TABLET, FILM COATED ORAL at 10:49

## 2025-07-01 RX ADMIN — Medication 1000 MCG: at 13:08

## 2025-07-01 RX ADMIN — THIAMINE HYDROCHLORIDE 200 MG: 100 INJECTION, SOLUTION INTRAMUSCULAR; INTRAVENOUS at 05:47

## 2025-07-01 RX ADMIN — POTASSIUM CHLORIDE 10 MEQ: 7.46 INJECTION, SOLUTION INTRAVENOUS at 06:39

## 2025-07-01 RX ADMIN — Medication 2 PACKET: at 20:35

## 2025-07-01 RX ADMIN — POTASSIUM CHLORIDE 10 MEQ: 7.46 INJECTION, SOLUTION INTRAVENOUS at 05:44

## 2025-07-01 RX ADMIN — GADOBENATE DIMEGLUMINE 20 ML: 529 INJECTION, SOLUTION INTRAVENOUS at 16:49

## 2025-07-01 RX ADMIN — POTASSIUM CHLORIDE 10 MEQ: 7.46 INJECTION, SOLUTION INTRAVENOUS at 03:33

## 2025-07-01 RX ADMIN — SODIUM CHLORIDE 75 ML/HR: 9 INJECTION, SOLUTION INTRAVENOUS at 23:00

## 2025-07-01 RX ADMIN — POTASSIUM CHLORIDE 10 MEQ: 7.46 INJECTION, SOLUTION INTRAVENOUS at 04:40

## 2025-07-01 RX ADMIN — LISINOPRIL 10 MG: 10 TABLET ORAL at 10:49

## 2025-07-01 RX ADMIN — POTASSIUM CHLORIDE 40 MEQ: 1.5 POWDER, FOR SOLUTION ORAL at 13:08

## 2025-07-01 RX ADMIN — Medication 2 PACKET: at 08:51

## 2025-07-01 RX ADMIN — ASPIRIN 81 MG CHEWABLE TABLET 81 MG: 81 TABLET CHEWABLE at 10:49

## 2025-07-01 RX ADMIN — POTASSIUM CHLORIDE 10 MEQ: 7.46 INJECTION, SOLUTION INTRAVENOUS at 01:19

## 2025-07-01 RX ADMIN — POTASSIUM CHLORIDE 10 MEQ: 7.46 INJECTION, SOLUTION INTRAVENOUS at 02:23

## 2025-07-01 RX ADMIN — ACETAMINOPHEN 650 MG: 325 TABLET, FILM COATED ORAL at 13:08

## 2025-07-01 RX ADMIN — THIAMINE HYDROCHLORIDE 200 MG: 100 INJECTION, SOLUTION INTRAMUSCULAR; INTRAVENOUS at 20:36

## 2025-07-01 RX ADMIN — CLOPIDOGREL BISULFATE 75 MG: 75 TABLET, FILM COATED ORAL at 10:49

## 2025-07-01 RX ADMIN — ACETAMINOPHEN 650 MG: 325 TABLET, FILM COATED ORAL at 20:35

## 2025-07-01 RX ADMIN — POTASSIUM CHLORIDE 40 MEQ: 1.5 POWDER, FOR SOLUTION ORAL at 20:34

## 2025-07-01 RX ADMIN — Medication 10 ML: at 08:52

## 2025-07-01 NOTE — CONSULTS
"[facility]  Initial Inpatient Consult Note    Referring Provider: Neptali Souza MD    Reason for Consultation: Blood in stool, patient in fact has significant weight loss early satiety and anorexia and nausea    Subjective     History of present illness:    Patient is a 75-year-old gentleman who was admitted with a 3 to 4 weeks history of anorexia nausea early satiety and 40 pound weight loss.  His wife and daughter were present in the room and supplemented the history.  The reason for patient's admission was slurring of speech and unsteadiness of gait.  I am not sure if he is being evaluated by neurology as well or not.    The consult note mentioned blood in the stool which is not borne out by the history.  Patient had \"watery bowel movement couple of times in the past week.  This in itself does not indicate anything new or diarrhea.  In fact he had high-quality colonoscopy in July 2021 and is not due for another one in 2026.    Abdominal examination is benign.  Lab evaluation is significant for hypokalemia and borderline low albumin.  Hemoglobin is 13.5 with an MCV of 100    Past Medical History:  Past Medical History:   Diagnosis Date    Penaloza esophagus     Ex-smoker     GERD (gastroesophageal reflux disease)     Hypertension     Sleep apnea     uses CPAP     Past Surgical History:  Past Surgical History:   Procedure Laterality Date    BACK SURGERY      CAROTID ENDARTERECTOMY Right     COLONOSCOPY N/A 3/7/2017    Procedure: COLONOSCOPY TO CECUM AND TERMINAL ILEUM WITH COLD BIOPSY POLYPECTOMIES;  Surgeon: Dago KEMP MD;  Location: [facility] ENDOSCOPY;  Service:     COLONOSCOPY N/A 7/22/2021    Procedure: COLONOSCOPY TO CECUM/TI;  Surgeon: Dago Abbott MD;  Location: [facility] ENDOSCOPY;  Service: Gastroenterology;  Laterality: N/A;  pre: HX OF POLYPS  post: HEMORRHOIDS, DIVERTICULOSIS, OTHERWISE NORMAL TO TI    ENDOSCOPY N/A 3/7/2017    Procedure: " ESOPHAGOGASTRODUODENOSCOPY WITH BIOPSIES;  Surgeon: Dago KEMP MD;  Location: [facility] ENDOSCOPY;  Service:     ENDOSCOPY N/A 2019    Procedure: ESOPHAGOGASTRODUODENOSCOPY with biopsies;  Surgeon: Dago Abbott MD;  Location: [facility] ENDOSCOPY;  Service: Gastroenterology    ENDOSCOPY N/A 2021    Procedure: ESOPHAGOGASTRODUODENOSCOPY WITH BX'S;  Surgeon: Dago Abbott MD;  Location: [facility] ENDOSCOPY;  Service: Gastroenterology;  Laterality: N/A;  pre: GERD, ALVARADO'S ESOPHAGUS  post: ALVARADO'S ESOPHAGUS, ESOPHAGITIS, HIATAL HERNIA,GASTRITIS    KNEE ARTHROSCOPY Left       Social History:   Social History     Tobacco Use    Smoking status: Former     Current packs/day: 0.00     Types: Cigarettes     Quit date:      Years since quittin.5    Smokeless tobacco: Never   Substance Use Topics    Alcohol use: Yes     Alcohol/week: 3.0 standard drinks of alcohol     Types: 3 Cans of beer per week     Comment: social       Family History:  Family History   Problem Relation Age of Onset    Colon cancer Maternal Aunt        Home Meds:  Medications Prior to Admission   Medication Sig Dispense Refill Last Dose/Taking    amLODIPine (NORVASC) 5 MG tablet Take 1 tablet by mouth Daily.   2025    aspirin 81 MG chewable tablet Chew 1 tablet Daily.   2025 Morning    B Complex Vitamins (VITAMIN B COMPLEX PO) Take 1 tablet by mouth Daily.   2025    cholecalciferol (VITAMIN D3) 1000 UNITS tablet Take 1 tablet by mouth Daily.   2025    clopidogrel (PLAVIX) 75 MG tablet Take 1 tablet by mouth Daily.   2025    lansoprazole (PREVACID) 30 MG capsule Take 1 capsule by mouth Daily.   2025    moexipril (UNIVASC) 15 MG tablet Take 2 tablets by mouth Daily.   2025    rosuvastatin (CRESTOR) 40 MG tablet Take 1 tablet by mouth Daily.   2025    Ventolin  (90 Base) MCG/ACT inhaler Inhale 2 puffs Every 4 (Four) Hours As Needed.   Taking As Needed    Multiple Vitamin  (MULTI VITAMIN DAILY PO) Take 1 tablet by mouth Daily.       Omega-3 Fatty Acids (fish oil) 1000 MG capsule capsule Take 1 capsule by mouth Daily. (Patient not taking: Reported on 7/1/2025)   Not Taking    tadalafil (CIALIS) 5 MG tablet Take 1 tablet by mouth Daily.       vitamin E 400 UNIT capsule Take 1 capsule by mouth Daily. (Patient not taking: Reported on 7/1/2025)   Not Taking     Current Meds:   aspirin, 81 mg, Oral, Daily  clopidogrel, 75 mg, Oral, Daily  folic acid, 1 mg, Oral, Daily  lisinopril, 10 mg, Oral, Q24H  pantoprazole, 40 mg, Oral, Q AM  potassium chloride, 40 mEq, Oral, Q4H  rosuvastatin, 40 mg, Oral, Daily  sodium chloride, 10 mL, Intravenous, Q12H  thiamine (B-1) IV, 200 mg, Intravenous, Q8H   Followed by  [START ON 7/5/2025] thiamine, 100 mg, Oral, Daily  vitamin B-12, 1,000 mcg, Oral, Daily      Allergies:  No Known Allergies  Review of Systems  Pertinent items are noted in HPI     Objective     Vital Signs  Temp:  [97.7 °F (36.5 °C)-99.1 °F (37.3 °C)] 97.7 °F (36.5 °C)  Heart Rate:  [67-81] 71  Resp:  [17-20] 20  BP: (100-161)/(70-87) 139/70  Physical Exam:  General Appearance:    Alert, cooperative, in no acute distress   Head:    Normocephalic, without obvious abnormality, atraumatic   Eyes:          conjunctivae and sclerae normal, no   icterus   Throat:   no thrush, oral mucosa moist   Neck:   Supple, no adenopathy   Lungs:     Clear to auscultation bilaterally    Heart:    Regular rhythm and normal rate    Chest Wall:    No abnormalities observed   Abdomen:     Soft, nondistended, nontender; normal bowel sounds   Extremities:   no edema, no redness   Skin:   No bruising or rash   Psychiatric:  normal mood and insight     Results Review:   I reviewed the patient's new clinical results.    Results from last 7 days   Lab Units 07/01/25  0557 06/30/25  1055   WBC 10*3/mm3 4.66 8.42   HEMOGLOBIN g/dL 13.5 15.1   HEMATOCRIT % 36.9* 42.0   PLATELETS 10*3/mm3 165 223     Results from last 7  days   Lab Units 07/01/25  1147 07/01/25  0557 06/30/25  2344 06/30/25  1348 06/30/25  1055   SODIUM mmol/L 133* 133* 132*   < > 125*   POTASSIUM mmol/L 3.0* 2.4* 2.1*   < > 2.3*   CHLORIDE mmol/L 93* 92* 90*   < > 82*   CO2 mmol/L 29.3* 25.0 26.0   < > 25.8   BUN mg/dL 6.0* 6.0* 7.0*   < > 9.0   CREATININE mg/dL 0.80 0.73* 0.81   < > 0.97   CALCIUM mg/dL 8.6 8.4* 8.3*   < > 8.7   BILIRUBIN mg/dL  --  1.9*  --   --  2.3*   ALK PHOS U/L  --  55  --   --  64   ALT (SGPT) U/L  --  16  --   --  22   AST (SGOT) U/L  --  45*  --   --  54*   GLUCOSE mg/dL 97 95 84   < > 114*    < > = values in this interval not displayed.         Lab Results   Lab Value Date/Time    LIPASE 65 (H) 07/01/2025 0557    LIPASE 83 (H) 06/30/2025 1124       Radiology:  XR Chest 1 View   Final Result   No focal pulmonary consolidation. Follow-up as clinical   indications persist.       This report was finalized on 6/30/2025 11:33 AM by Dr. Souleymane Salter M.D on Workstation: [id number]          CT Head Without Contrast   Final Result      CT Abdomen Pelvis With Contrast   Final Result       1. Displaced left posterior 11th rib fracture.   2. No acute intra-abdominal or intrapelvic findings.   3. Colonic diverticulosis.   4. Mild prostatomegaly.   5. Bulky calcific atherosclerotic disease seen in the proximal SMA, with   suspected vessel occlusion and recanalization via a pancreaticoduodenal   collateral, unchanged.   6. Stable aneurysmal infrarenal abdominal aorta measuring 3.4 cm and   right common iliac artery fusiform aneurysm measuring 2.4 cm. 3-year   follow-up CTA abdomen/pelvis can reevaluate.    7. Severe aortoiliac atherosclerotic calcification with bilateral   proximal common iliac artery atherosclerotic stenoses.    8. Fibrosis/interstitial lung disease seen at the lung bases       This report was finalized on 6/30/2025 11:19 AM by Dr. Dago Quevedo M.D on Workstation: [id number]          MRI Brain With & Without Contrast     (Results Pending)       Assessment & Plan   Active Hospital Problems    Diagnosis     **Ataxia     Left rib fracture     History of CVA (cerebrovascular accident)     PVD (peripheral vascular disease)     Diarrhea     Hypopotassemia     Hyponatremia     Prolonged QT interval     Alcohol use disorder     Early satiety     Gastroesophageal reflux disease     Stenosis of right subclavian artery     Benign essential hypertension        Assessment:  75-year-old gentleman with anorexia nausea early satiety and weight loss.  Would be extremely important to rule out gastric malignancy and an upper GI endoscopy is being scheduled for tomorrow.  Patient was reassured that a repeat colonoscopy is not needed in view of high-quality colonoscopy 3-1/2 years ago.      Plan:  An upper GI endoscopy is being scheduled for tomorrow.  The pros and cons of the procedure and potential risks and benefits were discussed with the patient and the family and they were reassured.      I discussed the patients findings and my recommendations with patient and family.          Juice Wilkes M.D.  [facility]  [phone]

## 2025-07-01 NOTE — THERAPY EVALUATION
Patient Name: Evan Yoon  : 1950    MRN: [id number]                              Today's Date: 2025       Admit Date: 2025    Visit Dx:     ICD-10-CM ICD-9-CM   1. Hyponatremia  E87.1 276.1   2. Hypokalemia  E87.6 276.8   3. Ataxia  R27.0 781.3   4. QT prolongation  R94.31 794.31   5. Generalized abdominal pain  R10.84 789.07     Patient Active Problem List   Diagnosis    Penaloza's esophagus without dysplasia    Gastroesophageal reflux disease    Adenomatous polyp of colon    TIA (transient ischemic attack)    Asymptomatic stenosis of left carotid artery    Benign essential hypertension    History of right-sided carotid endarterectomy    Hyperlipidemia    Stenosis of right subclavian artery    Acute CVA (cerebrovascular accident)    Ataxia    Left rib fracture    History of CVA (cerebrovascular accident)    PVD (peripheral vascular disease)    Diarrhea    Hypopotassemia    Hyponatremia    Prolonged QT interval    Alcohol use disorder     Past Medical History:   Diagnosis Date    Penaloza esophagus     Ex-smoker     GERD (gastroesophageal reflux disease)     Hypertension     Sleep apnea     uses CPAP     Past Surgical History:   Procedure Laterality Date    BACK SURGERY      CAROTID ENDARTERECTOMY Right     COLONOSCOPY N/A 3/7/2017    Procedure: COLONOSCOPY TO CECUM AND TERMINAL ILEUM WITH COLD BIOPSY POLYPECTOMIES;  Surgeon: Dago KEMP MD;  Location: [facility] ENDOSCOPY;  Service:     COLONOSCOPY N/A 2021    Procedure: COLONOSCOPY TO CECUM/TI;  Surgeon: Dago Abbott MD;  Location: [facility] ENDOSCOPY;  Service: Gastroenterology;  Laterality: N/A;  pre: HX OF POLYPS  post: HEMORRHOIDS, DIVERTICULOSIS, OTHERWISE NORMAL TO TI    ENDOSCOPY N/A 3/7/2017    Procedure: ESOPHAGOGASTRODUODENOSCOPY WITH BIOPSIES;  Surgeon: Dago KEMP MD;  Location: [facility] ENDOSCOPY;  Service:     ENDOSCOPY N/A 2019    Procedure: ESOPHAGOGASTRODUODENOSCOPY with biopsies;  Surgeon: Milena  Dago KEMP MD;  Location: [facility] ENDOSCOPY;  Service: Gastroenterology    ENDOSCOPY N/A 7/22/2021    Procedure: ESOPHAGOGASTRODUODENOSCOPY WITH BX'S;  Surgeon: Dago Abbott MD;  Location: [facility] ENDOSCOPY;  Service: Gastroenterology;  Laterality: N/A;  pre: GERD, ALVARADO'S ESOPHAGUS  post: ALVARADO'S ESOPHAGUS, ESOPHAGITIS, HIATAL HERNIA,GASTRITIS    KNEE ARTHROSCOPY Left       General Information       Row Name 07/01/25 1055          Physical Therapy Time and Intention    Document Type discharge evaluation/summary  -     Mode of Treatment individual therapy;physical therapy  -       Row Name 07/01/25 1055          General Information    Prior Level of Function independent:;gait;transfer;bed mobility  no AD  -     Existing Precautions/Restrictions no known precautions/restrictions  -     Barriers to Rehab none identified  -       Row Name 07/01/25 1055          Living Environment    Current Living Arrangements home  -     People in Home spouse  -       Row Name 07/01/25 1055          Cognition    Orientation Status (Cognition) oriented x 4  -               User Key  (r) = Recorded By, (t) = Taken By, (c) = Cosigned By      Initials Name Provider Type     Daisy Silver, PT Physical Therapist                   Mobility       Row Name 07/01/25 1056          Bed Mobility    Bed Mobility supine-sit;sit-supine  -     Supine-Sit [place] (Bed Mobility) independent  -     Sit-Supine [place] (Bed Mobility) not tested  up in restroom, RN present  -     Assistive Device (Bed Mobility) bed rails  -       Row Name 07/01/25 1056          Sit-Stand Transfer    Sit-Stand [place] (Transfers) independent  -       Row Name 07/01/25 1056          Gait/Stairs (Locomotion)    [place] Level (Gait) independent  -     Distance in Feet (Gait) 50  -CH     Comment, (Gait/Stairs) no LOB or impaired gait noted  -               User Key  (r) = Recorded By, (t) = Taken By, (c) =  Cosigned By      Initials Name Provider Type     Daisy Silver PT Physical Therapist                   Obj/Interventions       Row Name 07/01/25 1058          Range of Motion Comprehensive    General Range of Motion no range of motion deficits identified  -[facility] Name 07/01/25 1058          Strength Comprehensive (MMT)    General Manual Muscle Testing (MMT) Assessment no strength deficits identified  -     Comment, General Manual Muscle Testing (MMT) Assessment B LE grossly 5/5  -[facility] Name 07/01/25 1058          Balance    Balance Assessment standing static balance;standing dynamic balance  -     Static Standing Balance independent  -     Dynamic Standing Balance independent  -               User Key  (r) = Recorded By, (t) = Taken By, (c) = Cosigned By      Initials Name Provider Type     Daisy Silver, PT Physical Therapist                   Goals/Plan    No documentation.                  Clinical Impression       [facility] Name 07/01/25 1059          Pain    Pretreatment Pain Rating 0/10 - no pain  -     Posttreatment Pain Rating 0/10 - no pain  -[facility] Name 07/01/25 1059          Plan of Care Review    Plan of Care Reviewed With patient  -     Outcome Evaluation PT is a 76 yo M who was admitted with ataxia, AMS, recent fall, and hypokalemia. Pt demonstrates adequate strength and balance to perform funcitonal mobility and gait independently. This PT did not notice impaired gait or balance during ambulation and B LE strength is grossly intact. Pt appears to be near his baseline function. Acute care PT will sign off at this time.  -       Row Name 07/01/25 1059          Therapy Assessment/Plan (PT)    Criteria for Skilled Interventions Met (PT) no problems identified which require skilled intervention  -     Therapy Frequency (PT) evaluation only  -       Row Name 07/01/25 1058          Positioning and Restraints    Pre-Treatment Position in bed  -     Post  Treatment Position bathroom  -     Bathroom with nsg  pt up in restroom and RN present when PT left the room  -               User Key  (r) = Recorded By, (t) = Taken By, (c) = Cosigned By      Initials Name Provider Type     Daisy Silver, PT Physical Therapist                   Outcome Measures       Row Name 07/01/25 1102          How much help from another person do you currently need...    Turning from your back to your side while in flat bed without using bedrails? 4  -CH     Moving from lying on back to sitting on the side of a flat bed without bedrails? 4  -CH     Moving to and from a bed to a chair (including a wheelchair)? 4  -CH     Standing up from a chair using your arms (e.g., wheelchair, bedside chair)? 4  -CH     Climbing 3-5 steps with a railing? 4  -CH     To walk in hospital room? 4  -     AM-PAC 6 Clicks Score (PT) 24  -     Highest Level of Mobility Goal Walk 250 Feet or More - 8  -       Row Name 07/01/25 1102 07/01/25 1032       Modified [place] Scale    Modified [place] Scale 0 - No Symptoms at all.  - 1 - No significant disability despite symptoms.  Able to carry out all usual duties and activities. (P)   -      Row Name 07/01/25 1102 07/01/25 1032       Functional Assessment    Outcome Measure Options AM-PAC 6 Clicks Basic Mobility (PT);Modified [place]  - AM-PAC 6 Clicks Daily Activity (OT);Modified [place] (P)   -              User Key  (r) = Recorded By, (t) = Taken By, (c) = Cosigned By      Initials Name Provider Type     Daisy Silver, PT Physical Therapist    TW Frederick Dumont OT Student OT Student                                 Physical Therapy Education       Title: PT OT SLP Therapies (In Progress)       Topic: Physical Therapy (In Progress)       Point: Mobility training (Done)       Learning Progress Summary            Patient Acceptance, E,TB,D, VU,DU by  at 7/1/2025 1102                      Point: Home exercise program (Not Started)        Learner Progress:  Not documented in this visit.              Point: Body mechanics (Done)       Learning Progress Summary            Patient Acceptance, E,TB,D, VU,DU by  at 7/1/2025 1102                      Point: Precautions (Done)       Learning Progress Summary            Patient Acceptance, E,TB,D, VU,DU by  at 7/1/2025 1102                                      User Key       Initials Effective Dates Name Provider Type [facility] 06/16/21 -  Daisy Silver PT Physical Therapist PT                  PT Recommendation and Plan     Outcome Evaluation: PT is a 74 yo M who was admitted with ataxia, AMS, recent fall, and hypokalemia. Pt demonstrates adequate strength and balance to perform funcitonal mobility and gait independently. This PT did not notice impaired gait or balance during ambulation and B LE strength is grossly intact. Pt appears to be near his baseline function. Acute care PT will sign off at this time.     Time Calculation:         PT Charges       Row Name 07/01/25 1102             Time Calculation    Start Time 1043  -      Stop Time 1051  -      Time Calculation (min) 8 min  -      PT Received On 07/01/25  -                User Key  (r) = Recorded By, (t) = Taken By, (c) = Cosigned By      Initials Name Provider Type     Daisy Silver, PT Physical Therapist                  Therapy Charges for Today       Code Description Service Date Service Provider Modifiers Qty    [id number] HC PT EVAL LOW COMPLEXITY 2 7/1/2025 Daisy Silver PT GP 1            PT G-Codes  Outcome Measure Options: AM-PAC 6 Clicks Basic Mobility (PT), Modified [place]  AM-PAC 6 Clicks Score (PT): 24  AM-PAC 6 Clicks Score (OT): (P) 24  Modified [place] Scale: 0 - No Symptoms at all.  PT Discharge Summary  Anticipated Discharge Disposition (PT): home    Daisy Silver PT  7/1/2025

## 2025-07-01 NOTE — PLAN OF CARE
Goal Outcome Evaluation:     Patient calm, compliant. Admitted to unit at start of shift. RN called in GI and nephrology consult. Left voicemails for follow up in AM. VSS. Patient critical K (2.1) - oncall provider notified. See MAR for K replacement schedule. MIVF infusing per MAR. Patient up adlib although staff present in room to prevent falls. No c/o pain. A&Ox4. Plan of care ongoing. MRI pending. RN WCTM

## 2025-07-01 NOTE — PROGRESS NOTES
Name: Evan Yoon ADMIT: 2025   : 1950  PCP: John Fishcer MD    MRN: [id number] LOS: 1 days   AGE/SEX: 75 y.o. male  ROOM: [facility]     Subjective   Subjective   Patient denies any complaint. No shortness of breath or abdominal pain or chest pain. Denies any lightheadedness when up. He stood up when I was in the room.     Objective   Objective   Vital Signs  Temp:  [97.9 °F (36.6 °C)-99.1 °F (37.3 °C)] 97.9 °F (36.6 °C)  Heart Rate:  [] 81  Resp:  [17-20] 20  BP: ()/(50-87) 140/87  SpO2:  [96 %-100 %] 100 %  on   ;   Device (Oxygen Therapy): room air  Body mass index is 30.11 kg/m².    Physical Exam  Constitutional:       General: He is not in acute distress.     Appearance: He is not toxic-appearing.   HENT:      Head: Normocephalic and atraumatic.   Cardiovascular:      Rate and Rhythm: Normal rate and regular rhythm.   Pulmonary:      Effort: Pulmonary effort is normal. No respiratory distress.      Breath sounds: Normal breath sounds. No wheezing or rhonchi.   Abdominal:      General: Bowel sounds are normal.      Palpations: Abdomen is soft.      Tenderness: There is no abdominal tenderness. There is no guarding or rebound.   Musculoskeletal:         General: No swelling.   Skin:     General: Skin is warm and dry.   Neurological:      Mental Status: He is alert.   Psychiatric:         Mood and Affect: Mood normal.         Behavior: Behavior normal.     Results Review  I reviewed the patient's new clinical results.  Results from last 7 days   Lab Units 25  0557 25  1055   WBC 10*3/mm3 4.66 8.42   HEMOGLOBIN g/dL 13.5 15.1   PLATELETS 10*3/mm3 165 223     Results from last 7 days   Lab Units 25  0557 25  2344 25  1348 25  1055   SODIUM mmol/L 133* 132* 128* 125*   POTASSIUM mmol/L 2.4* 2.1* 2.2* 2.3*   CHLORIDE mmol/L 92* 90* 84* 82*   CO2 mmol/L 25.0 26.0 26.0 25.8   BUN mg/dL 6.0* 7.0* 9.0 9.0   CREATININE mg/dL 0.73* 0.81 0.95 0.97   GLUCOSE  "mg/dL 95 84 105* 114*     Lab Results   Component Value Date    ANIONGAP 16.0 (H) 07/01/2025     Estimated Creatinine Clearance: 105.9 mL/min (A) (by C-G formula based on SCr of 0.73 mg/dL (L)).   Lab Results   Component Value Date    EGFR 94.3 07/01/2025     Results from last 7 days   Lab Units 07/01/25  0557 06/30/25  1055   ALBUMIN g/dL 3.4* 3.7   BILIRUBIN mg/dL 1.9* 2.3*   ALK PHOS U/L 55 64   AST (SGOT) U/L 45* 54*   ALT (SGPT) U/L 16 22     Results from last 7 days   Lab Units 07/01/25  0557 06/30/25  2344 06/30/25  1348 06/30/25  1124 06/30/25  1055   CALCIUM mg/dL 8.4* 8.3* 8.7  --  8.7   ALBUMIN g/dL 3.4*  --   --   --  3.7   MAGNESIUM mg/dL 2.2  --  2.3 2.3  --    PHOSPHORUS mg/dL 1.8*  --  2.2*  --   --        No results found for: \"HGBA1C\", \"POCGLU\"    XR Chest 1 View  Result Date: 6/30/2025  No focal pulmonary consolidation. Follow-up as clinical indications persist.  This report was finalized on 6/30/2025 11:33 AM by Dr. Souleymane Salter M.D on Workstation: [id number]      CT Abdomen Pelvis With Contrast  Result Date: 6/30/2025   1. Displaced left posterior 11th rib fracture. 2. No acute intra-abdominal or intrapelvic findings. 3. Colonic diverticulosis. 4. Mild prostatomegaly. 5. Bulky calcific atherosclerotic disease seen in the proximal SMA, with suspected vessel occlusion and recanalization via a pancreaticoduodenal collateral, unchanged. 6. 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 3-year follow-up CTA abdomen/pelvis can reevaluate. 7. Severe aortoiliac atherosclerotic calcification with bilateral proximal common iliac artery atherosclerotic stenoses. 8. Fibrosis/interstitial lung disease seen at the lung bases  This report was finalized on 6/30/2025 11:19 AM by Dr. Dago Quevedo M.D on Workstation: [id number]        Scheduled Meds  aspirin, 81 mg, Oral, Daily  clopidogrel, 75 mg, Oral, Daily  folic acid, 1 " mg, Oral, Daily  lisinopril, 10 mg, Oral, Q24H  pantoprazole, 40 mg, Oral, Q AM  rosuvastatin, 40 mg, Oral, Daily  sodium chloride, 10 mL, Intravenous, Q12H  thiamine (B-1) IV, 200 mg, Intravenous, Q8H   Followed by  [START ON 7/5/2025] thiamine, 100 mg, Oral, Daily    Continuous Infusions  sodium chloride, 100 mL/hr, Last Rate: 100 mL/hr (06/30/25 2058)  sodium chloride, 75 mL/hr, Last Rate: Stopped (07/01/25 0824)    PRN Meds    albuterol    senna-docusate sodium **AND** polyethylene glycol **AND** bisacodyl **AND** bisacodyl    Calcium Replacement - Follow Nurse / BPA Driven Protocol    LORazepam **OR** LORazepam **OR** LORazepam **OR** LORazepam **OR** LORazepam **OR** LORazepam    Magnesium Standard Dose Replacement - Follow Nurse / BPA Driven Protocol    ondansetron    Phosphorus Replacement - Follow Nurse / BPA Driven Protocol    Potassium Replacement - Follow Nurse / BPA Driven Protocol    [COMPLETED] Insert Peripheral IV **AND** sodium chloride    sodium chloride    sodium chloride    sodium chloride, 100 mL/hr, Last Rate: 100 mL/hr (06/30/25 2058)  sodium chloride, 75 mL/hr, Last Rate: Stopped (07/01/25 0824)    Diet  Diet: Liquid; Clear Liquid; Fluid Consistency: Thin (IDDSI 0)     None              Assessment/Plan     Active Hospital Problems    Diagnosis  POA    **Ataxia [R27.0]  Yes    Left rib fracture [S22.32XA]  Unknown    History of CVA (cerebrovascular accident) [Z86.73]  Not Applicable    PVD (peripheral vascular disease) [I73.9]  Unknown    Diarrhea [R19.7]  Unknown    Hypopotassemia [E87.6]  Unknown    Hyponatremia [E87.1]  Unknown    Prolonged QT interval [R94.31]  Unknown    Alcohol use disorder [F10.90]  Unknown    Gastroesophageal reflux disease [K21.9]  Yes    Stenosis of right subclavian artery [I77.1]  Yes    Benign essential hypertension [I10]  Yes      Resolved Hospital Problems   No resolved problems to display.     Patient is a 75 y.o. male     Falls dizziness  Left posterior 11th  rib fracture  History of stroke  CT head no hemorrhage or acute infarct or contusion  MRI brain pending  Orthostatics were positive continue checks  Continue aspirin and Plavix. Statin-LFTs not too high     Abdominal pain  Nausea vomiting diarrhea  History of polyps  Weight loss  LFT elevation  Reported rectal bleeding recently (has stopped and Hgb OK)  Check stool PCR (no BM)  Lipase elevated possible pancreatitis causing symptoms however he currently denies any abdominal pain.  GI consult for rectal bleeding and weight loss  Is mild elevated AST in the past  Continue PPI     Hypokalemia, prolonged QT (new from low K)  Hyponatremia  Hypophosphatemia  Suspect hyponatremia is from hypovolemia. Urine sodium was low. TSH and cortisol okay. Dizziness improved with fluids will recheck orthostatics.  Monitoring QTc still low as he still has hypokalemia  Continue electrolyte correction     PVD  Hypertension  On aspirin and Plavix, Norvasc and ACE inhibitor at home  Severe calcification seen on CT scans  3.4 cm infrarenal abdominal aortic aneurysm  2.4 cm right common iliac artery fusiform aneurysm     Reported alcohol use disorder per family  Could be contributing to hyponatremia and falls  MCV is also elevated. B12 low normal  If he is having maybe a mild pancreatitis could be from alcohol  UDS and alcohol level negative  Check CIWA    DVT prophylaxis  SCDs    Discharge  TBD  Expected discharge date/ time has not been documented.    Discussed with patient and nursing staff    Neptali Souza MD  [facility]  07/01/25 11:37 EDT

## 2025-07-01 NOTE — CONSULTS
"  [facility] Consult Note      Patient Name: Evan Yoon  : 1950  MRN: [id number]  Primary Care Physician:  John Fischer MD  Referring Physician: Neptali Souza MD  Date of admission: 2025    Subjective     Reason for Consult: Hyponatremia    HPI:   Evan Yoon is a 75 y.o. male with normal renal function baseline, alcohol abuse, very esophagus, HTN, JULIOCESAR, and prior stroke, who presented to the hospital for dizziness/lightheadedness, trouble walking, and recent mechanical falls.     Upon arrival, Na 128, K 2.2, Scr 0.95. UA bland. CT head NEG for acute findings;CT abd/pel showed L 11th rib fx; had significantly positive orthostatic BP, started on IVF and KCl replacement.  Sodium today at 133, K 3.0.     Patient also reports having had 40 pounds of weight loss due to decreased appetite since last stroke about 2 years ago; had nausea and diarrhea last week, no vomiting.  No chest pain, SOA, or urinary symptoms.  Takes NSAIDs periodically for arthritic pain.  Drinks alcohol often per chart review, though patient was not able to quantify the amount, states \" I have not been drinking since I fell last time\".     Review of Systems:   14 point review of systems is otherwise negative except for mentioned above on HPI    Personal History     Past Medical History:   Diagnosis Date    Penaloza esophagus     Ex-smoker     GERD (gastroesophageal reflux disease)     Hypertension     Sleep apnea     uses CPAP       Past Surgical History:   Procedure Laterality Date    BACK SURGERY      CAROTID ENDARTERECTOMY Right     COLONOSCOPY N/A 3/7/2017    Procedure: COLONOSCOPY TO CECUM AND TERMINAL ILEUM WITH COLD BIOPSY POLYPECTOMIES;  Surgeon: Dago KEMP MD;  Location: [facility] ENDOSCOPY;  Service:     COLONOSCOPY N/A 2021    Procedure: COLONOSCOPY TO CECUM/TI;  Surgeon: Dago Abbott MD;  Location: [facility] ENDOSCOPY;  Service: Gastroenterology;  Laterality: N/A;  pre: HX " OF POLYPS  post: HEMORRHOIDS, DIVERTICULOSIS, OTHERWISE NORMAL TO TI    ENDOSCOPY N/A 3/7/2017    Procedure: ESOPHAGOGASTRODUODENOSCOPY WITH BIOPSIES;  Surgeon: Dago KEMP MD;  Location: [facility] ENDOSCOPY;  Service:     ENDOSCOPY N/A 4/23/2019    Procedure: ESOPHAGOGASTRODUODENOSCOPY with biopsies;  Surgeon: Dago Abbott MD;  Location: [facility] ENDOSCOPY;  Service: Gastroenterology    ENDOSCOPY N/A 7/22/2021    Procedure: ESOPHAGOGASTRODUODENOSCOPY WITH BX'S;  Surgeon: Dago Abbott MD;  Location: [facility] ENDOSCOPY;  Service: Gastroenterology;  Laterality: N/A;  pre: GERD, ALVARADO'S ESOPHAGUS  post: ALVARADO'S ESOPHAGUS, ESOPHAGITIS, HIATAL HERNIA,GASTRITIS    KNEE ARTHROSCOPY Left        Family History: family history includes Colon cancer in his maternal aunt.    Social History:  reports that he quit smoking about 35 years ago. His smoking use included cigarettes. He has never used smokeless tobacco. He reports current alcohol use of about 3.0 standard drinks of alcohol per week. He reports that he does not use drugs.    Home Medications:  Prior to Admission medications    Medication Sig Start Date End Date Taking? Authorizing Provider   amLODIPine (NORVASC) 5 MG tablet Take 1 tablet by mouth Daily. 5/24/21  Yes ProviderConstantino MD   aspirin 81 MG chewable tablet Chew 1 tablet Daily.   Yes ProviderConstantino MD   B Complex Vitamins (VITAMIN B COMPLEX PO) Take 1 tablet by mouth Daily.   Yes ProviderConstantino MD   cholecalciferol (VITAMIN D3) 1000 UNITS tablet Take 1 tablet by mouth Daily.   Yes ProviderConstantino MD   clopidogrel (PLAVIX) 75 MG tablet Take 1 tablet by mouth Daily.   Yes ProviderConstantino MD   lansoprazole (PREVACID) 30 MG capsule Take 1 capsule by mouth Daily.   Yes ProviderConstantino MD   moexipril (UNIVASC) 15 MG tablet Take 2 tablets by mouth Daily.   Yes ProviderConstantino MD   rosuvastatin (CRESTOR) 40 MG tablet Take 1 tablet by mouth Daily.   Yes  Constantino Huston MD   Ventolin  (90 Base) MCG/ACT inhaler Inhale 2 puffs Every 4 (Four) Hours As Needed. 5/24/21  Yes Constantino Huston MD   Multiple Vitamin (MULTI VITAMIN DAILY PO) Take 1 tablet by mouth Daily.    Constantino Huston MD   Omega-3 Fatty Acids (fish oil) 1000 MG capsule capsule Take 1 capsule by mouth Daily.  Patient not taking: Reported on 7/1/2025    Constantino Huston MD   tadalafil (CIALIS) 5 MG tablet Take 1 tablet by mouth Daily. 5/24/21   Constantino Huston MD   vitamin E 400 UNIT capsule Take 1 capsule by mouth Daily.  Patient not taking: Reported on 7/1/2025    Constantino Huston MD       Allergies:  No Known Allergies    Objective     Vitals:   Temp:  [97.7 °F (36.5 °C)-99.1 °F (37.3 °C)] 97.7 °F (36.5 °C)  Heart Rate:  [67-81] 79  Resp:  [17-20] 20  BP: (100-158)/(64-87) 102/64    Intake/Output Summary (Last 24 hours) at 7/1/2025 1655  Last data filed at 7/1/2025 1230  Gross per 24 hour   Intake 1300 ml   Output --   Net 1300 ml       Physical Exam:   Constitutional: Awake, chronically ill, no acute distress.  HEENT: Sclera anicteric, no conjunctival injection  Neck: Supple,  no JVD  Respiratory: Clear to auscultation bilaterally, nonlabored respiration  Cardiovascular: RRR, no murmurs, no rub, no carotid bruit  Gastrointestinal: BS +, abdomen is soft, nontender and nondistended  : No palpable bladder  Musculoskeletal: No edema, no clubbing or cyanosis  Psychiatric: Appropriate affect, cooperative  Neurologic: Oriented x3, moving all extremities, normal speech and mental status  Skin: Warm and dry       Scheduled Meds:     aspirin, 81 mg, Oral, Daily  clopidogrel, 75 mg, Oral, Daily  folic acid, 1 mg, Oral, Daily  [Held by provider] lisinopril, 10 mg, Oral, Q24H  pantoprazole, 40 mg, Oral, Q AM  potassium chloride, 40 mEq, Oral, Q4H  rosuvastatin, 40 mg, Oral, Daily  sodium chloride, 10 mL, Intravenous, Q12H  thiamine (B-1) IV, 200 mg, Intravenous, Q8H    Followed by  [START ON 7/5/2025] thiamine, 100 mg, Oral, Daily  vitamin B-12, 1,000 mcg, Oral, Daily      IV Meds:   sodium chloride, 75 mL/hr, Last Rate: Stopped (07/01/25 0824)        Results Reviewed:   I have personally reviewed the results from the time of this admission to 7/1/2025 16:55 EDT     Lab Results   Component Value Date    GLUCOSE 97 07/01/2025    CALCIUM 8.6 07/01/2025     (L) 07/01/2025    K 3.0 (L) 07/01/2025    CO2 29.3 (H) 07/01/2025    CL 93 (L) 07/01/2025    BUN 6.0 (L) 07/01/2025    CREATININE 0.80 07/01/2025    BCR 7.5 07/01/2025    ANIONGAP 10.7 07/01/2025      Lab Results   Component Value Date    MG 2.2 07/01/2025    PHOS 1.8 (C) 07/01/2025    ALBUMIN 3.4 (L) 07/01/2025           Assessment / Plan       Ataxia    Gastroesophageal reflux disease    Benign essential hypertension    Stenosis of right subclavian artery    Left rib fracture    History of CVA (cerebrovascular accident)    PVD (peripheral vascular disease)    Diarrhea    Hypopotassemia    Hyponatremia    Prolonged QT interval    Alcohol use disorder    Early satiety      ASSESSMENT:  Acute on chronic hyponatremia, in association with hypovolemia, decrease solute intake  (Uosm 499  Hui <20), and hypotension.  Improved with IVF.  Renal function stable at baseline  Hypokalemia, magnesium fine.  In association with GI losses and poor oral intake, on replacement therapy  Hypophosphatemia, in association with poor oral intake, on replacement protocol  Mechanical falls/L rib fracture, managed by primary team  Ataxia/dizziness, CT head negative.  Brain MRI pending  HTN, BP soft with positive orthostatics.  On IVF  GERD/recent nausea and diarrhea, EGD tomorrow, followed by GI  Transminitis, GI following  Hx of stroke  Alcohol use disorder  Significant weight loss, over 40 pounds in the past 2 years    PLAN:  Hold lisinopril  Replace phosphorus and potassium per protocol  Continue IVF  Continue to check orthostatic BP each  shift  Surveillance labs    Thank you for involving us in the care of Evan Yoon.  Please feel free to call with any questions.    Jesus Morgan MD  07/01/25  16:55 EDT    [facility]  [phone]      Please note that portions of this note were completed with a voice recognition program.

## 2025-07-01 NOTE — PLAN OF CARE
Goal Outcome Evaluation:            EGD in AM, NPO @ midnight, replacing electrolytes, Orthos positive, rest vitals stable

## 2025-07-01 NOTE — PLAN OF CARE
Goal Outcome Evaluation:  Plan of Care Reviewed With: (P) patient        Progress: (P) improving  Outcome Evaluation: (P) The pt was admitted to [facility] 2/2 to AMS and abdominal pain. Pmhx includes TIA and hypertension. The pt was supine upon arrival and was A&O x4. The pt stated he lives in a 1-story home with 1 [address]ANNA with a basement (~10 steps down). The pt stated he was (I) in all ADLs and did not utilize any AE prior to hospitalization. The pt participated this AM in an OT evaluation on this service date. Today, the pt particiapted in bed mobility with SBA to the EOB where he demonstrated LBD with SBA. The pt engaged in a STS with SBA and participated in functional mobility with SBA to the sinkside in the restroom. The pt performed oral care with SBA and mobilized back to the EOB. The pt stated that he was at his baseline and stated there were not deficits that he was worried about. Home is recommended upon d/c. OT will sign off.    Anticipated Discharge Disposition (OT): (P) home

## 2025-07-01 NOTE — PLAN OF CARE
Goal Outcome Evaluation:  Plan of Care Reviewed With: patient           Outcome Evaluation: PT is a 76 yo M who was admitted with ataxia, AMS, recent fall, and hypokalemia. Pt demonstrates adequate strength and balance to perform funcitonal mobility and gait independently. This PT did not notice impaired gait or balance during ambulation and B LE strength is grossly intact. Pt appears to be near his baseline function. Acute care PT will sign off at this time.    Anticipated Discharge Disposition (PT): home

## 2025-07-02 ENCOUNTER — ANESTHESIA EVENT (OUTPATIENT)
Dept: GASTROENTEROLOGY | Facility: HOSPITAL | Age: 75
End: 2025-07-02
Payer: MEDICARE

## 2025-07-02 ENCOUNTER — ANESTHESIA (OUTPATIENT)
Dept: GASTROENTEROLOGY | Facility: HOSPITAL | Age: 75
End: 2025-07-02
Payer: MEDICARE

## 2025-07-02 ENCOUNTER — APPOINTMENT (OUTPATIENT)
Dept: CARDIOLOGY | Facility: HOSPITAL | Age: 75
End: 2025-07-02
Payer: MEDICARE

## 2025-07-02 LAB
ALBUMIN SERPL-MCNC: 3.2 G/DL (ref 3.5–5.2)
ALP SERPL-CCNC: 57 U/L (ref 39–117)
ALT SERPL W P-5'-P-CCNC: 18 U/L (ref 1–41)
ANION GAP SERPL CALCULATED.3IONS-SCNC: 8.7 MMOL/L (ref 5–15)
AST SERPL-CCNC: 44 U/L (ref 1–40)
BASOPHILS # BLD AUTO: 0.02 10*3/MM3 (ref 0–0.2)
BASOPHILS NFR BLD AUTO: 0.4 % (ref 0–1.5)
BH CV UPPER DUPLEX LEFT AXILLARY ART PSV: 69 CM/S
BH CV UPPER DUPLEX LEFT BRACHIAL ART PSV: 96 CM/S
BH CV UPPER DUPLEX LEFT RADIAL ART PSV: 72 CM/S
BH CV UPPER DUPLEX LEFT SUBCLAVIAN  ART PSV: 192 CM/S
BH CV UPPER DUPLEX LEFT ULNAR ARTERY PSV: 61 CM/S
BH CV UPPER DUPLEX RIGHT AXILLARY ARTERY PSV: 31 CM/S
BH CV UPPER DUPLEX RIGHT BRACHIAL ART PSV: 34 CM/S
BH CV UPPER DUPLEX RIGHT RADIAL ART PSV: 25 CM/S
BH CV UPPER DUPLEX RIGHT SUBCLAVIAN ART PSV: 216 CM/S
BH CV UPPER DUPLEX RIGHT ULNAR ART PSV: 17 CM/S
BILIRUB CONJ SERPL-MCNC: 0.5 MG/DL (ref 0–0.3)
BILIRUB INDIRECT SERPL-MCNC: 0.7 MG/DL
BILIRUB SERPL-MCNC: 1.2 MG/DL (ref 0–1.2)
BUN SERPL-MCNC: 5 MG/DL (ref 8–23)
BUN/CREAT SERPL: 6.3 (ref 7–25)
CALCIUM SPEC-SCNC: 8.2 MG/DL (ref 8.6–10.5)
CHLORIDE SERPL-SCNC: 100 MMOL/L (ref 98–107)
CO2 SERPL-SCNC: 26.3 MMOL/L (ref 22–29)
CREAT SERPL-MCNC: 0.79 MG/DL (ref 0.76–1.27)
DEPRECATED RDW RBC AUTO: 53.1 FL (ref 37–54)
EGFRCR SERPLBLD CKD-EPI 2021: 92.1 ML/MIN/1.73
EOSINOPHIL # BLD AUTO: 0.18 10*3/MM3 (ref 0–0.4)
EOSINOPHIL NFR BLD AUTO: 3.9 % (ref 0.3–6.2)
ERYTHROCYTE [DISTWIDTH] IN BLOOD BY AUTOMATED COUNT: 13.8 % (ref 12.3–15.4)
GLUCOSE SERPL-MCNC: 99 MG/DL (ref 65–99)
HCT VFR BLD AUTO: 38.4 % (ref 37.5–51)
HGB BLD-MCNC: 13.5 G/DL (ref 13–17.7)
IMM GRANULOCYTES # BLD AUTO: 0.01 10*3/MM3 (ref 0–0.05)
IMM GRANULOCYTES NFR BLD AUTO: 0.2 % (ref 0–0.5)
LIPASE SERPL-CCNC: 70 U/L (ref 13–60)
LYMPHOCYTES # BLD AUTO: 0.6 10*3/MM3 (ref 0.7–3.1)
LYMPHOCYTES NFR BLD AUTO: 12.8 % (ref 19.6–45.3)
MAGNESIUM SERPL-MCNC: 2 MG/DL (ref 1.6–2.4)
MCH RBC QN AUTO: 36.9 PG (ref 26.6–33)
MCHC RBC AUTO-ENTMCNC: 35.2 G/DL (ref 31.5–35.7)
MCV RBC AUTO: 104.9 FL (ref 79–97)
MONOCYTES # BLD AUTO: 0.68 10*3/MM3 (ref 0.1–0.9)
MONOCYTES NFR BLD AUTO: 14.6 % (ref 5–12)
NEUTROPHILS NFR BLD AUTO: 3.18 10*3/MM3 (ref 1.7–7)
NEUTROPHILS NFR BLD AUTO: 68.1 % (ref 42.7–76)
NRBC BLD AUTO-RTO: 0 /100 WBC (ref 0–0.2)
PHOSPHATE SERPL-MCNC: 2.2 MG/DL (ref 2.5–4.5)
PHOSPHATE SERPL-MCNC: 2.2 MG/DL (ref 2.5–4.5)
PLATELET # BLD AUTO: 172 10*3/MM3 (ref 140–450)
PMV BLD AUTO: 9.5 FL (ref 6–12)
POTASSIUM SERPL-SCNC: 2.9 MMOL/L (ref 3.5–5.2)
POTASSIUM SERPL-SCNC: 2.9 MMOL/L (ref 3.5–5.2)
POTASSIUM SERPL-SCNC: 3.1 MMOL/L (ref 3.5–5.2)
PROT SERPL-MCNC: 5.5 G/DL (ref 6–8.5)
QT INTERVAL: 470 MS
QTC INTERVAL: 488 MS
RBC # BLD AUTO: 3.66 10*6/MM3 (ref 4.14–5.8)
SODIUM SERPL-SCNC: 135 MMOL/L (ref 136–145)
UPPER ARTERIAL LEFT ARM BRACHIAL SYS MAX: NORMAL
UPPER ARTERIAL RIGHT ARM BRACHIAL SYS MAX: NORMAL
WBC NRBC COR # BLD AUTO: 4.67 10*3/MM3 (ref 3.4–10.8)

## 2025-07-02 PROCEDURE — 80048 BASIC METABOLIC PNL TOTAL CA: CPT | Performed by: HOSPITALIST

## 2025-07-02 PROCEDURE — 93010 ELECTROCARDIOGRAM REPORT: CPT | Performed by: INTERNAL MEDICINE

## 2025-07-02 PROCEDURE — 25010000002 THIAMINE PER 100 MG: Performed by: HOSPITALIST

## 2025-07-02 PROCEDURE — 25810000003 SODIUM CHLORIDE 0.9 % SOLUTION

## 2025-07-02 PROCEDURE — 84100 ASSAY OF PHOSPHORUS: CPT | Performed by: HOSPITALIST

## 2025-07-02 PROCEDURE — 83690 ASSAY OF LIPASE: CPT | Performed by: HOSPITALIST

## 2025-07-02 PROCEDURE — 0DJ08ZZ INSPECTION OF UPPER INTESTINAL TRACT, VIA NATURAL OR ARTIFICIAL OPENING ENDOSCOPIC: ICD-10-PCS | Performed by: INTERNAL MEDICINE

## 2025-07-02 PROCEDURE — 93005 ELECTROCARDIOGRAM TRACING: CPT | Performed by: HOSPITALIST

## 2025-07-02 PROCEDURE — 84132 ASSAY OF SERUM POTASSIUM: CPT | Performed by: HOSPITALIST

## 2025-07-02 PROCEDURE — 25010000002 PROPOFOL 10 MG/ML EMULSION: Performed by: NURSE ANESTHETIST, CERTIFIED REGISTERED

## 2025-07-02 PROCEDURE — 43235 EGD DIAGNOSTIC BRUSH WASH: CPT | Performed by: INTERNAL MEDICINE

## 2025-07-02 PROCEDURE — 80076 HEPATIC FUNCTION PANEL: CPT | Performed by: HOSPITALIST

## 2025-07-02 PROCEDURE — 83735 ASSAY OF MAGNESIUM: CPT | Performed by: HOSPITALIST

## 2025-07-02 PROCEDURE — 93930 UPPER EXTREMITY STUDY: CPT | Performed by: SURGERY

## 2025-07-02 PROCEDURE — 25810000003 SODIUM CHLORIDE 0.9 % SOLUTION: Performed by: HOSPITALIST

## 2025-07-02 PROCEDURE — 25010000002 POTASSIUM CHLORIDE 10 MEQ/100ML SOLUTION: Performed by: HOSPITALIST

## 2025-07-02 PROCEDURE — 25810000003 SODIUM CHLORIDE 0.9 % SOLUTION: Performed by: INTERNAL MEDICINE

## 2025-07-02 PROCEDURE — 25010000002 LIDOCAINE 2% SOLUTION: Performed by: NURSE ANESTHETIST, CERTIFIED REGISTERED

## 2025-07-02 PROCEDURE — 85025 COMPLETE CBC W/AUTO DIFF WBC: CPT | Performed by: HOSPITALIST

## 2025-07-02 PROCEDURE — 93930 UPPER EXTREMITY STUDY: CPT

## 2025-07-02 RX ORDER — POTASSIUM CHLORIDE 1.5 G/1.58G
40 POWDER, FOR SOLUTION ORAL EVERY 4 HOURS
Status: COMPLETED | OUTPATIENT
Start: 2025-07-02 | End: 2025-07-03

## 2025-07-02 RX ORDER — SODIUM CHLORIDE 9 MG/ML
30 INJECTION, SOLUTION INTRAVENOUS CONTINUOUS PRN
Status: DISCONTINUED | OUTPATIENT
Start: 2025-07-02 | End: 2025-07-02

## 2025-07-02 RX ORDER — FENTANYL/ROPIVACAINE/NS/PF 2-625MCG/1
15 PLASTIC BAG, INJECTION (ML) EPIDURAL ONCE
Status: DISCONTINUED | OUTPATIENT
Start: 2025-07-02 | End: 2025-07-02

## 2025-07-02 RX ORDER — PROPOFOL 10 MG/ML
VIAL (ML) INTRAVENOUS AS NEEDED
Status: DISCONTINUED | OUTPATIENT
Start: 2025-07-02 | End: 2025-07-02 | Stop reason: SURG

## 2025-07-02 RX ORDER — FENTANYL/ROPIVACAINE/NS/PF 2-625MCG/1
15 PLASTIC BAG, INJECTION (ML) EPIDURAL ONCE
Status: COMPLETED | OUTPATIENT
Start: 2025-07-02 | End: 2025-07-02

## 2025-07-02 RX ORDER — POTASSIUM CHLORIDE 7.45 MG/ML
10 INJECTION INTRAVENOUS
Status: COMPLETED | OUTPATIENT
Start: 2025-07-02 | End: 2025-07-02

## 2025-07-02 RX ORDER — LIDOCAINE HYDROCHLORIDE 20 MG/ML
INJECTION, SOLUTION INFILTRATION; PERINEURAL AS NEEDED
Status: DISCONTINUED | OUTPATIENT
Start: 2025-07-02 | End: 2025-07-02 | Stop reason: SURG

## 2025-07-02 RX ORDER — FENTANYL/ROPIVACAINE/NS/PF 2-625MCG/1
15 PLASTIC BAG, INJECTION (ML) EPIDURAL ONCE
Status: DISCONTINUED | OUTPATIENT
Start: 2025-07-02 | End: 2025-07-02 | Stop reason: ALTCHOICE

## 2025-07-02 RX ORDER — SODIUM CHLORIDE 0.9 % (FLUSH) 0.9 %
10 SYRINGE (ML) INJECTION EVERY 12 HOURS SCHEDULED
Status: DISCONTINUED | OUTPATIENT
Start: 2025-07-02 | End: 2025-07-03 | Stop reason: HOSPADM

## 2025-07-02 RX ADMIN — POTASSIUM CHLORIDE 10 MEQ: 7.46 INJECTION, SOLUTION INTRAVENOUS at 08:42

## 2025-07-02 RX ADMIN — POTASSIUM CHLORIDE 10 MEQ: 7.46 INJECTION, SOLUTION INTRAVENOUS at 02:55

## 2025-07-02 RX ADMIN — LIDOCAINE HYDROCHLORIDE 100 MG: 20 INJECTION, SOLUTION INFILTRATION; PERINEURAL at 09:34

## 2025-07-02 RX ADMIN — SODIUM CHLORIDE 30 ML/HR: 9 INJECTION, SOLUTION INTRAVENOUS at 09:24

## 2025-07-02 RX ADMIN — PROPOFOL 80 MG: 10 INJECTION, EMULSION INTRAVENOUS at 09:34

## 2025-07-02 RX ADMIN — Medication 1000 MCG: at 08:31

## 2025-07-02 RX ADMIN — POTASSIUM CHLORIDE 10 MEQ: 7.46 INJECTION, SOLUTION INTRAVENOUS at 07:36

## 2025-07-02 RX ADMIN — Medication 10 ML: at 08:32

## 2025-07-02 RX ADMIN — POTASSIUM CHLORIDE 40 MEQ: 1.5 POWDER, FOR SOLUTION ORAL at 21:55

## 2025-07-02 RX ADMIN — ACETAMINOPHEN 650 MG: 325 TABLET, FILM COATED ORAL at 10:27

## 2025-07-02 RX ADMIN — FOLIC ACID 1 MG: 1 TABLET ORAL at 08:31

## 2025-07-02 RX ADMIN — Medication 2 PACKET: at 21:55

## 2025-07-02 RX ADMIN — POTASSIUM CHLORIDE 10 MEQ: 7.46 INJECTION, SOLUTION INTRAVENOUS at 04:03

## 2025-07-02 RX ADMIN — CLOPIDOGREL BISULFATE 75 MG: 75 TABLET, FILM COATED ORAL at 14:32

## 2025-07-02 RX ADMIN — POTASSIUM CHLORIDE 10 MEQ: 7.46 INJECTION, SOLUTION INTRAVENOUS at 05:17

## 2025-07-02 RX ADMIN — POTASSIUM CHLORIDE 40 MEQ: 1.5 POWDER, FOR SOLUTION ORAL at 17:58

## 2025-07-02 RX ADMIN — Medication 10 ML: at 10:28

## 2025-07-02 RX ADMIN — THIAMINE HYDROCHLORIDE 200 MG: 100 INJECTION, SOLUTION INTRAMUSCULAR; INTRAVENOUS at 21:56

## 2025-07-02 RX ADMIN — THIAMINE HYDROCHLORIDE 200 MG: 100 INJECTION, SOLUTION INTRAMUSCULAR; INTRAVENOUS at 05:16

## 2025-07-02 RX ADMIN — POTASSIUM CHLORIDE 10 MEQ: 7.46 INJECTION, SOLUTION INTRAVENOUS at 06:23

## 2025-07-02 RX ADMIN — POTASSIUM PHOSPHATE, MONOBASIC AND POTASSIUM PHOSPHATE, DIBASIC 15 MMOL: 224; 236 INJECTION, SOLUTION, CONCENTRATE INTRAVENOUS at 10:27

## 2025-07-02 RX ADMIN — ASPIRIN 81 MG CHEWABLE TABLET 81 MG: 81 TABLET CHEWABLE at 14:32

## 2025-07-02 RX ADMIN — THIAMINE HYDROCHLORIDE 200 MG: 100 INJECTION, SOLUTION INTRAMUSCULAR; INTRAVENOUS at 14:31

## 2025-07-02 RX ADMIN — ROSUVASTATIN CALCIUM 40 MG: 20 TABLET, FILM COATED ORAL at 08:31

## 2025-07-02 RX ADMIN — SODIUM CHLORIDE 1000 ML: 9 INJECTION, SOLUTION INTRAVENOUS at 15:09

## 2025-07-02 NOTE — PROGRESS NOTES
Name: Evan Yoon ADMIT: 2025   : 1950  PCP: John Fischer MD    MRN: [id number] LOS: 2 days   AGE/SEX: 75 y.o. male  ROOM: [facility]     Subjective   Subjective   No new complaints. No chest pain or shortness of breath or abdominal pain.     Objective   Objective   Vital Signs  Temp:  [97.7 °F (36.5 °C)-98.4 °F (36.9 °C)] 98.2 °F (36.8 °C)  Heart Rate:  [61-81] 68  Resp:  [16-20] 16  BP: (102-155)/(64-93) 155/93  SpO2:  [97 %-100 %] 98 %  on   ;   Device (Oxygen Therapy): room air  Body mass index is 30.11 kg/m².    Physical Exam  Constitutional:       General: He is not in acute distress.     Appearance: He is not toxic-appearing.   HENT:      Head: Normocephalic and atraumatic.   Cardiovascular:      Rate and Rhythm: Normal rate and regular rhythm.   Pulmonary:      Effort: Pulmonary effort is normal. No respiratory distress.      Breath sounds: Normal breath sounds. No wheezing or rhonchi.   Abdominal:      General: Bowel sounds are normal.      Palpations: Abdomen is soft.      Tenderness: There is no abdominal tenderness. There is no guarding or rebound.   Musculoskeletal:         General: No swelling.   Skin:     General: Skin is warm and dry.   Neurological:      Mental Status: He is alert.   Psychiatric:         Mood and Affect: Mood normal.         Behavior: Behavior normal.     Results Review  I reviewed the patient's new clinical results.  Results from last 7 days   Lab Units 25  0158 25  0557 25  1055   WBC 10*3/mm3 4.67 4.66 8.42   HEMOGLOBIN g/dL 13.5 13.5 15.1   PLATELETS 10*3/mm3 172 165 223     Results from last 7 days   Lab Units 25  0158 25  1147 25  0557 25  2344   SODIUM mmol/L 135* 133* 133* 132*   POTASSIUM mmol/L 2.9*  2.9* 3.0* 2.4* 2.1*   CHLORIDE mmol/L 100 93* 92* 90*   CO2 mmol/L 26.3 29.3* 25.0 26.0   BUN mg/dL 5.0* 6.0* 6.0* 7.0*   CREATININE mg/dL 0.79 0.80 0.73* 0.81   GLUCOSE mg/dL 99 97 95 84     Lab Results   Component  "Value Date    ANIONGAP 8.7 07/02/2025     Estimated Creatinine Clearance: 97.9 mL/min (by C-G formula based on SCr of 0.79 mg/dL).   Lab Results   Component Value Date    EGFR 92.1 07/02/2025     Results from last 7 days   Lab Units 07/02/25  0158 07/01/25  0557 06/30/25  1055   ALBUMIN g/dL 3.2* 3.4* 3.7   BILIRUBIN mg/dL 1.2 1.9* 2.3*   ALK PHOS U/L 57 55 64   AST (SGOT) U/L 44* 45* 54*   ALT (SGPT) U/L 18 16 22     Results from last 7 days   Lab Units 07/02/25  0158 07/01/25  1722 07/01/25  1147 07/01/25  0557 06/30/25  2344 06/30/25  1348 06/30/25  1124 06/30/25  1055   CALCIUM mg/dL 8.2*  --  8.6 8.4* 8.3* 8.7  --  8.7   ALBUMIN g/dL 3.2*  --   --  3.4*  --   --   --  3.7   MAGNESIUM mg/dL 2.0  --   --  2.2  --  2.3 2.3  --    PHOSPHORUS mg/dL 2.2* 1.9*  --  1.8*  --  2.2*  --   --        No results found for: \"HGBA1C\", \"POCGLU\"    MRI Brain With & Without Contrast  Result Date: 7/2/2025  IMPRESSION : Mild volume loss and nonspecific white matter change, not exceptional for age. No evidence of acute abnormality.  This report was finalized on 7/2/2025 1:17 AM by Dr. Ephraim Benitez M.D on Workstation: [id number]      XR Chest 1 View  Result Date: 6/30/2025  No focal pulmonary consolidation. Follow-up as clinical indications persist.  This report was finalized on 6/30/2025 11:33 AM by Dr. Souleymane Salter M.D on Workstation: [id number]      CT Abdomen Pelvis With Contrast  Result Date: 6/30/2025   1. Displaced left posterior 11th rib fracture. 2. No acute intra-abdominal or intrapelvic findings. 3. Colonic diverticulosis. 4. Mild prostatomegaly. 5. Bulky calcific atherosclerotic disease seen in the proximal SMA, with suspected vessel occlusion and recanalization via a pancreaticoduodenal collateral, unchanged. 6. 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 3-year follow-up CTA abdomen/pelvis can reevaluate. 7. Severe aortoiliac " atherosclerotic calcification with bilateral proximal common iliac artery atherosclerotic stenoses. 8. Fibrosis/interstitial lung disease seen at the lung bases  This report was finalized on 6/30/2025 11:19 AM by Dr. Dago Quevedo M.D on Workstation: [id number]        Scheduled Meds  aspirin, 81 mg, Oral, Daily  clopidogrel, 75 mg, Oral, Daily  folic acid, 1 mg, Oral, Daily  [Held by provider] lisinopril, 10 mg, Oral, Q24H  pantoprazole, 40 mg, Oral, Q AM  potassium chloride, 10 mEq, Intravenous, Q1H  potassium phosphate, 15 mmol, Intravenous, Once  rosuvastatin, 40 mg, Oral, Daily  sodium chloride, 10 mL, Intravenous, Q12H  thiamine (B-1) IV, 200 mg, Intravenous, Q8H   Followed by  [START ON 7/5/2025] thiamine, 100 mg, Oral, Daily  vitamin B-12, 1,000 mcg, Oral, Daily    Continuous Infusions     PRN Meds    acetaminophen    albuterol    senna-docusate sodium **AND** polyethylene glycol **AND** bisacodyl **AND** bisacodyl    Calcium Replacement - Follow Nurse / BPA Driven Protocol    LORazepam **OR** LORazepam **OR** LORazepam **OR** LORazepam **OR** LORazepam **OR** LORazepam    Magnesium Standard Dose Replacement - Follow Nurse / BPA Driven Protocol    ondansetron    Phosphorus Replacement - Follow Nurse / BPA Driven Protocol    Potassium Replacement - Follow Nurse / BPA Driven Protocol    [COMPLETED] Insert Peripheral IV **AND** sodium chloride    sodium chloride    sodium chloride       Diet  NPO Diet NPO Type: Sips with Meds     Date/Time CIWA-Ar Total    07/02/25 0600 0     07/02/25 0400 0     07/02/25 0200 0     07/01/25 2330 0     07/01/25 2035 0     07/01/25 1600 0     07/01/25 1300 0     07/01/25 0800 2               Assessment/Plan     Active Hospital Problems    Diagnosis  POA    **Ataxia [R27.0]  Yes    Left rib fracture [S22.32XA]  Unknown    History of CVA (cerebrovascular accident) [Z86.73]  Not Applicable    PVD (peripheral vascular disease) [I73.9]  Unknown    Diarrhea [R19.7]  Unknown     Hypopotassemia [E87.6]  Unknown    Hyponatremia [E87.1]  Unknown    Prolonged QT interval [R94.31]  Unknown    Alcohol use disorder [F10.90]  Unknown    Early satiety [R68.81]  Unknown    Gastroesophageal reflux disease [K21.9]  Yes    Stenosis of right subclavian artery [I77.1]  Yes    Benign essential hypertension [I10]  Yes      Resolved Hospital Problems   No resolved problems to display.     Patient is a 75 y.o. male     Falls dizziness due to orthostasis  Left posterior 11th rib fracture  CT head no hemorrhage or acute infarct or contusion  MRI brain nothing acute  Orthostatics were positive but didn't drop as low. continue monitoring. dc IVF    History of stroke  Continue aspirin and Plavix. Statin-LFTs not too high     Abdominal pain  Nausea vomiting diarrhea  History of polyps  Weight loss  LFT elevation  Reported rectal bleeding recently (has stopped and Hgb OK)  stool PCR ordered but no BM  Lipase elevated possible pancreatitis causing symptoms however he has had no abdominal pain  Appreciate GI- consulted for rectal bleeding and weight loss and anorexia- scopes today  Is mild elevated AST in the past  Continue PPI     Hypokalemia, prolonged QT (new from low K)  Hyponatremia  Hypophosphatemia  Appreciate nephrology- hyponatremia is from hypovolemia. Urine sodium was low. TSH and cortisol okay.   Monitoring QTc better this morning  Hypophosphatemia from decreased intake  Continue electrolyte correction     PVD  Hypertension  On aspirin and Plavix, Norvasc and ACE inhibitor at home  Severe calcification seen on CT scans  3.4 cm infrarenal abdominal aortic aneurysm  2.4 cm right common iliac artery fusiform aneurysm     Reported alcohol use disorder per family  Could be contributing to hyponatremia and falls  MCV is also elevated. B12 low normal  If he is having maybe a mild pancreatitis could be from alcohol  UDS and alcohol level negative  CIWAs have been mostly 0    DVT  prophylaxis  SCDs    Discharge  Probably home depending on scopes and electrolytes and orthostatics  Expected Discharge Date: 7/3/2025; Expected Discharge Time:     Discussed with patient and nursing staff    Neptali Souza MD  [facility]  07/02/25 07:13 EDT

## 2025-07-02 NOTE — PROGRESS NOTES
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  HEENT: Sclera anicteric, no conjunctival injection  Neck: Supple,  no JVD  Respiratory: Clear to auscultation bilaterally, nonlabored respiration  Cardiovascular: RRR, no murmurs, no rub, no carotid bruit  Gastrointestinal: BS +, abdomen is soft, nontender and nondistended  : No palpable bladder  Musculoskeletal: No edema, no clubbing or cyanosis  Psychiatric: Appropriate affect, cooperative  Neurologic: Oriented x3, moving all extremities, normal speech and mental status  Skin: Warm and dry           Scheduled Meds:     aspirin, 81 mg, Oral, Daily  clopidogrel, 75 mg, Oral, Daily  folic acid, 1 mg, Oral, Daily  [Held by provider] lisinopril, 10 mg, Oral, Q24H  pantoprazole, 40 mg, Oral, Q AM  rosuvastatin, 40 mg, Oral, Daily  sodium chloride, 1,000 mL, Intravenous, Once  sodium chloride, 10 mL, Intravenous, Q12H  sodium chloride, 10 mL, Intravenous, Q12H  thiamine (B-1) IV, 200 mg, Intravenous, Q8H   Followed by  [START ON 2025] thiamine, 100 mg, Oral, Daily  vitamin B-12, 1,000 mcg, Oral, Daily      IV Meds:          Results Reviewed:   I have personally reviewed the  results from the time of this admission to 7/2/2025 17:08 EDT     Results from last 7 days   Lab Units 07/02/25  1518 07/02/25  0158 07/01/25  1147 07/01/25  0557 06/30/25  1348 06/30/25  1055   SODIUM mmol/L  --  135* 133* 133*   < > 125*   POTASSIUM mmol/L 3.1* 2.9*  2.9* 3.0* 2.4*   < > 2.3*   CHLORIDE mmol/L  --  100 93* 92*   < > 82*   CO2 mmol/L  --  26.3 29.3* 25.0   < > 25.8   BUN mg/dL  --  5.0* 6.0* 6.0*   < > 9.0   CREATININE mg/dL  --  0.79 0.80 0.73*   < > 0.97   CALCIUM mg/dL  --  8.2* 8.6 8.4*   < > 8.7   BILIRUBIN mg/dL  --  1.2  --  1.9*  --  2.3*   ALK PHOS U/L  --  57  --  55  --  64   ALT (SGPT) U/L  --  18  --  16  --  22   AST (SGOT) U/L  --  44*  --  45*  --  54*   GLUCOSE mg/dL  --  99 97 95   < > 114*    < > = values in this interval not displayed.       Estimated Creatinine Clearance: 97.9 mL/min (by C-G formula based on SCr of 0.79 mg/dL).    Results from last 7 days   Lab Units 07/02/25  0158 07/01/25  1722 07/01/25  0557 06/30/25  1348   MAGNESIUM mg/dL 2.0  --  2.2 2.3   PHOSPHORUS mg/dL 2.2* 1.9* 1.8* 2.2*             Results from last 7 days   Lab Units 07/02/25  0158 07/01/25  0557 06/30/25  1055   WBC 10*3/mm3 4.67 4.66 8.42   HEMOGLOBIN g/dL 13.5 13.5 15.1   PLATELETS 10*3/mm3 172 165 223             Assessment / Plan     ASSESSMENT:  Acute on chronic hyponatremia, in association with hypovolemia, decrease solute intake  (Uosm 499  Hui <20), and hypotension.  Improved with IVF.  Renal function stable at baseline  Hypokalemia, magnesium fine.  In association with GI losses and poor oral intake, on replacement therapy  Hypophosphatemia, in association with poor oral intake, on replacement protocol  Mechanical falls/L rib fracture, managed by primary team  Ataxia/dizziness, CT head negative.  Brain MRI pending  HTN, BP soft with positive orthostatics  GERD/recent nausea and diarrhea, s/p EGD 7/2, followed by GI  Transminitis, GI following  Hx of stroke  Alcohol use  disorder  Significant weight loss, over 40 pounds in the past 2 years    PLAN:  IV NS at 75 cc/h x 1 L  Continue to replace potassium and phosphorus per protocol  Surveillance labs    Thank you for involving us in the care of Evan Yoon.  Please feel free to call with any questions.    Jesus Morgan MD  07/02/25  17:08 EDT    [facility]  [phone]    Please note that portions of this note were completed with a voice recognition program.

## 2025-07-02 NOTE — BRIEF OP NOTE
ESOPHAGOGASTRODUODENOSCOPY  Progress Note    Evan Yoon  7/2/2025    Pre-op Diagnosis:   Early satiety [R68.81]       Post-Op Diagnosis Codes:     * Early satiety [R68.81]    Procedure(s):      Procedure(s):  ESOPHAGOGASTRODUODENOSCOPY              Surgeon(s):  Juice Wilkes MD    Anesthesia: Monitored Anesthesia Care    Staff:   Endo Technician: Hernandez Hernandez  Endo Nurse: Precious Martin RN       Estimated Blood Loss: none    Urine Voided: * No values recorded between 7/2/2025  9:29 AM and 7/2/2025  9:39 AM *    Specimens:                None      Drains: * No LDAs found *    Findings:   Short segment of Penaloza's esophagus in the distal esophagus without any esophagitis  Multiple hyperplastic gastric polyps in the fundus and proximal body of the stomach  Rest of examination up to third part of duodenum was normal.  Biopsies were obtained as patient had been on Plavix      Complications: None    Recommendations:    Above findings do not explain patient's symptoms  Consider an outpatient gastric emptying study after discharge from the hospital  Resume regular diet.  Patient will be scheduled to follow-up in the outpatient GI clinic a week after gastric emptying study.    He can be discharged from gastrointestinal standpoint.  GI service will sign off.  Please call if needed.          Juice Wilkes MD     Date: 7/2/2025  Time: 09:49 EDT

## 2025-07-02 NOTE — ANESTHESIA PREPROCEDURE EVALUATION
Anesthesia Evaluation     Patient summary reviewed and Nursing notes reviewed   NPO Solid Status: > 8 hours  NPO Liquid Status: > 4 hours           Airway   Mallampati: II  Neck ROM: full  No difficulty expected  Dental      Comment: Upper bridge    Pulmonary     breath sounds clear to auscultation  (+) ,sleep apnea on CPAP  Cardiovascular     Rhythm: regular    (+) hypertension, PVD, hyperlipidemia,  carotid artery disease carotid bilateral      Neuro/Psych  (+) TIA, CVA, psychiatric history  GI/Hepatic/Renal/Endo    (+) GERD    Musculoskeletal     Abdominal    Substance History      OB/GYN          Other                      Anesthesia Plan    ASA 3     MAC     intravenous induction     Anesthetic plan, risks, benefits, and alternatives have been provided, discussed and informed consent has been obtained with: patient.    CODE STATUS:    Code Status (Patient has no pulse and is not breathing): CPR (Attempt to Resuscitate)  Medical Interventions (Patient has pulse or is breathing): Full Support

## 2025-07-02 NOTE — CONSULTS
Patient Name: Evan Yoon  YOB: 1950  MRN: [id number]  Admission date: 6/30/2025  Reason for Encounter: MST 4-5 and Nurse Admission Screen    [facility] Clinical Nutrition Assessment     Subjective    Subjective Information     Pt is a 75 y.o. male with a history of Penaloza esophagus, GERD, TIA and HTN who presented to the ED with c/o AMS and abdominal pain. Pt reported a recent valles with head injury.     7/2: Family reporting they feel like pt has lost some wt. Recommend standing scale wt to assess severity of suspected wt loss. GI consulted for rectal bleeding and wt loss. Pt REYNALDO at ENDO at time of visit. Will f/u for NFPE/full nutrition interview as able.     Assessment    H&P and Current Problems      H&P  Past Medical History:   Diagnosis Date    Penaloza esophagus     Ex-smoker     GERD (gastroesophageal reflux disease)     Hypertension     Sleep apnea     uses CPAP      Past Surgical History:   Procedure Laterality Date    BACK SURGERY      CAROTID ENDARTERECTOMY Right     COLONOSCOPY N/A 3/7/2017    Procedure: COLONOSCOPY TO CECUM AND TERMINAL ILEUM WITH COLD BIOPSY POLYPECTOMIES;  Surgeon: Dago KEMP MD;  Location: [facility] ENDOSCOPY;  Service:     COLONOSCOPY N/A 7/22/2021    Procedure: COLONOSCOPY TO CECUM/TI;  Surgeon: Dago Abbott MD;  Location: [facility] ENDOSCOPY;  Service: Gastroenterology;  Laterality: N/A;  pre: HX OF POLYPS  post: HEMORRHOIDS, DIVERTICULOSIS, OTHERWISE NORMAL TO TI    ENDOSCOPY N/A 3/7/2017    Procedure: ESOPHAGOGASTRODUODENOSCOPY WITH BIOPSIES;  Surgeon: Dago KEMP MD;  Location: [facility] ENDOSCOPY;  Service:     ENDOSCOPY N/A 4/23/2019    Procedure: ESOPHAGOGASTRODUODENOSCOPY with biopsies;  Surgeon: Dago Abbott MD;  Location: [facility] ENDOSCOPY;  Service: Gastroenterology    ENDOSCOPY N/A 7/22/2021    Procedure: ESOPHAGOGASTRODUODENOSCOPY WITH BX'S;  Surgeon: Dago Abbott MD;  Location: [facility] ENDOSCOPY;  Service:  "Gastroenterology;  Laterality: N/A;  pre: GERD, ALVARADO'S ESOPHAGUS  post: ALVARADO'S ESOPHAGUS, ESOPHAGITIS, HIATAL HERNIA,GASTRITIS    KNEE ARTHROSCOPY Left       Current Problems   Admission Diagnosis:  Hypokalemia [E87.6]  Ataxia [R27.0]  Hyponatremia [E87.1]  Generalized abdominal pain [R10.84]  QT prolongation [R94.31]    Problem List:    Ataxia    Gastroesophageal reflux disease    Benign essential hypertension    Stenosis of right subclavian artery    Left rib fracture    History of CVA (cerebrovascular accident)    PVD (peripheral vascular disease)    Diarrhea    Hypopotassemia    Hyponatremia    Prolonged QT interval    Alcohol use disorder    Early satiety       Anthropometrics      BMI, Height, Weight Estimated body mass index is 30.11 kg/m² as calculated from the following:    Height as of this encounter: 182.9 cm (72\").    Weight as of this encounter: 101 kg (222 lb).    Weight Method: Stated       Trending Weight Changes 7/2:Unknown/Unable to Determine       Weight History  Wt Readings from Last 10 Encounters:   06/30/25 101 kg (222 lb)   11/17/23 104 kg (229 lb 6.4 oz)   10/03/23 108 kg (237 lb 3.4 oz)   09/24/23 109 kg (240 lb 1.3 oz)   07/22/21 110 kg (242 lb)   06/03/21 112 kg (247 lb 3.2 oz)   04/23/19 117 kg (257 lb 1 oz)   03/07/17 115 kg (253 lb 4 oz)        Labs      Comment: Reviewed, Na 135, K 2.9, BUN 5.0     Results from last 7 days   Lab Units 07/02/25  0158 07/01/25  1722 07/01/25  1147 07/01/25  0557 06/30/25  2344 06/30/25  1348 06/30/25  1124 06/30/25  1055   SODIUM mmol/L 135*  --  133* 133*   < > 128*  --  125*   POTASSIUM mmol/L 2.9*  2.9*  --  3.0* 2.4*   < > 2.2*  --  2.3*   GLUCOSE mg/dL 99  --  97 95   < > 105*  --  114*   BUN mg/dL 5.0*  --  6.0* 6.0*   < > 9.0  --  9.0   CREATININE mg/dL 0.79  --  0.80 0.73*   < > 0.95  --  0.97   CALCIUM mg/dL 8.2*  --  8.6 8.4*   < > 8.7  --  8.7   PHOSPHORUS mg/dL 2.2* 1.9*  --  1.8*  --  2.2*   < >  --    MAGNESIUM mg/dL 2.0  --   --  " 2.2  --  2.3   < >  --    ALBUMIN g/dL 3.2*  --   --  3.4*  --   --   --  3.7   BILIRUBIN mg/dL 1.2  --   --  1.9*  --   --   --  2.3*   ALK PHOS U/L 57  --   --  55  --   --   --  64   AST (SGOT) U/L 44*  --   --  45*  --   --   --  54*   ALT (SGPT) U/L 18  --   --  16  --   --   --  22    < > = values in this interval not displayed.     Results from last 7 days   Lab Units 07/02/25  0158 07/01/25  0557 06/30/25  1055   PLATELETS 10*3/mm3 172 165 223   HEMOGLOBIN g/dL 13.5 13.5 15.1   HEMATOCRIT % 38.4 36.9* 42.0     Lab Results   Component Value Date    HGBA1C 5.60 10/04/2023          Medications       Scheduled Medications aspirin, 81 mg, Oral, Daily  clopidogrel, 75 mg, Oral, Daily  folic acid, 1 mg, Oral, Daily  [Held by provider] lisinopril, 10 mg, Oral, Q24H  pantoprazole, 40 mg, Oral, Q AM  potassium chloride, 10 mEq, Intravenous, Q1H  potassium phosphate, 15 mmol, Intravenous, Once  rosuvastatin, 40 mg, Oral, Daily  sodium chloride, 10 mL, Intravenous, Q12H  sodium chloride, 10 mL, Intravenous, Q12H  thiamine (B-1) IV, 200 mg, Intravenous, Q8H   Followed by  [START ON 7/5/2025] thiamine, 100 mg, Oral, Daily  vitamin B-12, 1,000 mcg, Oral, Daily        Infusions sodium chloride, 30 mL/hr        PRN Medications   acetaminophen    albuterol    senna-docusate sodium **AND** polyethylene glycol **AND** bisacodyl **AND** bisacodyl    Calcium Replacement - Follow Nurse / BPA Driven Protocol    LORazepam **OR** LORazepam **OR** LORazepam **OR** LORazepam **OR** LORazepam **OR** LORazepam    Magnesium Standard Dose Replacement - Follow Nurse / BPA Driven Protocol    ondansetron    Phosphorus Replacement - Follow Nurse / BPA Driven Protocol    Potassium Replacement - Follow Nurse / BPA Driven Protocol    [COMPLETED] Insert Peripheral IV **AND** sodium chloride    sodium chloride    sodium chloride    sodium chloride     Physical Findings      Chewing/Swallowing    No issues identified at this time   Dentition  Mouth/Teeth WDL: .WDL except, teeth   Teeth Symptoms: tooth/teeth missing   Edema   no edema    Gastrointestinal hyperactive bowel sounds, non-distended , last bowel movement: 7/1   Skin location of wound: bilateral posterior head    Lines/Drains none   I/O reviewed      Nutrition Focused Physical Exam     NFPE Pending, due to: pt REYNALDO for procedure  07/02/25      Malnutrition Severity Assessment            (1)   Current Nutrition Orders & Evaluation of Intake      Oral Nutrition     Food Allergies  and Intolerances NKFA   Current PO Diet NPO Diet NPO Type: Sips with Meds   Oral Nutrition Supplement None     Trending % PO Intake 100% x 3 meals yesterday   (2)  Assessment & Plan   Nutrition Diagnosis and Goals       Nutrition Diagnosis Problem: Unintentional Weight Loss  Etiology: Factors Affecting Nutrition - abdominal pain, nausea, vomiting, diarrhea   Signs/Symptoms: Unintended Weight Change and Report/Observation        Goal(s) Maintain PO Intake , Tolerates PO Diet , and No Significant Weight Loss     Nutrition Intervention and Prescription       Intervention  Once diet advances, will offer Boost Original to support po intake.  ADAT per MD.  Will follow up for NFPE and interview as able.      Diet Prescription     Supplement Prescription Boost Original BID once diet advances    Education Provided     (3)  Monitoring/Evaluation       Monitor/Evaluation Per Protocol, PO Intake, and Weight      Electronically signed by:  Vickie Lima RD  07/02/25 09:19 EDT

## 2025-07-02 NOTE — PLAN OF CARE
Potassium and phosphorus remains low, working on replacing electrolytes. Will need to continue replacing on day shift. Pt NPO since 0000, consent signed for EGD in am. Pt received his plavix 7-1-25, endo nurse made aware, confirming with provider if they plan to proceed with EGD this AM.     Pt up ad genevieve. CIWA 0. Repeat labs this afternoon. LHA, GI, and nephro following. MRI results available now, provider to review with patient.     Goal Outcome Evaluation:

## 2025-07-02 NOTE — ANESTHESIA POSTPROCEDURE EVALUATION
"Patient: Evan Yoon    Procedure Summary       Date: 07/02/25 Room / Location:  [facility]    Anesthesia Start: 0929 Anesthesia Stop: 0941    Procedure: ESOPHAGOGASTRODUODENOSCOPY (Esophagus) Diagnosis:       Early satiety      (Early satiety [R68.81])    Surgeons: Juice Wilkes MD Provider: Matt Jerez MD    Anesthesia Type: MAC ASA Status: 3            Anesthesia Type: MAC    Vitals  Vitals Value Taken Time   BP 92/70 07/02/25 09:49   Temp     Pulse 68 07/02/25 09:52   Resp 16 07/02/25 09:49   SpO2 98 % 07/02/25 09:52   Vitals shown include unfiled device data.        Post Anesthesia Care and Evaluation    Patient location during evaluation: bedside  Patient participation: complete - patient participated  Level of consciousness: sleepy but conscious  Pain score: 0  Pain management: adequate    Airway patency: patent  Anesthetic complications: No anesthetic complications    Cardiovascular status: acceptable  Respiratory status: acceptable  Hydration status: acceptable    Comments: BP 92/70 (BP Location: Left arm, Patient Position: Lying)   Pulse 59   Temp 36.4 °C (97.5 °F) (Oral)   Resp 16   Ht 182.9 cm (72\")   Wt 101 kg (222 lb)   SpO2 98%   BMI 30.11 kg/m²       "

## 2025-07-03 ENCOUNTER — TELEPHONE (OUTPATIENT)
Dept: GASTROENTEROLOGY | Facility: CLINIC | Age: 75
End: 2025-07-03
Payer: MEDICARE

## 2025-07-03 ENCOUNTER — READMISSION MANAGEMENT (OUTPATIENT)
Dept: CALL CENTER | Facility: HOSPITAL | Age: 75
End: 2025-07-03
Payer: MEDICARE

## 2025-07-03 VITALS
HEART RATE: 74 BPM | TEMPERATURE: 98.2 F | SYSTOLIC BLOOD PRESSURE: 113 MMHG | OXYGEN SATURATION: 99 % | WEIGHT: 222 LBS | BODY MASS INDEX: 30.07 KG/M2 | HEIGHT: 72 IN | RESPIRATION RATE: 16 BRPM | DIASTOLIC BLOOD PRESSURE: 73 MMHG

## 2025-07-03 LAB
ALBUMIN SERPL-MCNC: 3.3 G/DL (ref 3.5–5.2)
ALP SERPL-CCNC: 58 U/L (ref 39–117)
ALT SERPL W P-5'-P-CCNC: 15 U/L (ref 1–41)
ANION GAP SERPL CALCULATED.3IONS-SCNC: 9.2 MMOL/L (ref 5–15)
AST SERPL-CCNC: 34 U/L (ref 1–40)
BASOPHILS # BLD AUTO: 0.02 10*3/MM3 (ref 0–0.2)
BASOPHILS NFR BLD AUTO: 0.4 % (ref 0–1.5)
BILIRUB CONJ SERPL-MCNC: 0.3 MG/DL (ref 0–0.3)
BILIRUB INDIRECT SERPL-MCNC: 0.5 MG/DL
BILIRUB SERPL-MCNC: 0.8 MG/DL (ref 0–1.2)
BUN SERPL-MCNC: 3 MG/DL (ref 8–23)
BUN/CREAT SERPL: 4.1 (ref 7–25)
CALCIUM SPEC-SCNC: 8.3 MG/DL (ref 8.6–10.5)
CHLORIDE SERPL-SCNC: 103 MMOL/L (ref 98–107)
CO2 SERPL-SCNC: 24.8 MMOL/L (ref 22–29)
CREAT SERPL-MCNC: 0.74 MG/DL (ref 0.76–1.27)
DEPRECATED RDW RBC AUTO: 50.5 FL (ref 37–54)
EGFRCR SERPLBLD CKD-EPI 2021: 93.9 ML/MIN/1.73
EOSINOPHIL # BLD AUTO: 0.19 10*3/MM3 (ref 0–0.4)
EOSINOPHIL NFR BLD AUTO: 3.4 % (ref 0.3–6.2)
ERYTHROCYTE [DISTWIDTH] IN BLOOD BY AUTOMATED COUNT: 13.4 % (ref 12.3–15.4)
GLUCOSE SERPL-MCNC: 101 MG/DL (ref 65–99)
HCT VFR BLD AUTO: 38.2 % (ref 37.5–51)
HGB BLD-MCNC: 13.6 G/DL (ref 13–17.7)
IMM GRANULOCYTES # BLD AUTO: 0.03 10*3/MM3 (ref 0–0.05)
IMM GRANULOCYTES NFR BLD AUTO: 0.5 % (ref 0–0.5)
LIPASE SERPL-CCNC: 66 U/L (ref 13–60)
LYMPHOCYTES # BLD AUTO: 0.65 10*3/MM3 (ref 0.7–3.1)
LYMPHOCYTES NFR BLD AUTO: 11.6 % (ref 19.6–45.3)
MAGNESIUM SERPL-MCNC: 1.9 MG/DL (ref 1.6–2.4)
MCH RBC QN AUTO: 37 PG (ref 26.6–33)
MCHC RBC AUTO-ENTMCNC: 35.6 G/DL (ref 31.5–35.7)
MCV RBC AUTO: 103.8 FL (ref 79–97)
MONOCYTES # BLD AUTO: 0.8 10*3/MM3 (ref 0.1–0.9)
MONOCYTES NFR BLD AUTO: 14.3 % (ref 5–12)
NEUTROPHILS NFR BLD AUTO: 3.9 10*3/MM3 (ref 1.7–7)
NEUTROPHILS NFR BLD AUTO: 69.8 % (ref 42.7–76)
NRBC BLD AUTO-RTO: 0 /100 WBC (ref 0–0.2)
PHOSPHATE SERPL-MCNC: 2.9 MG/DL (ref 2.5–4.5)
PLATELET # BLD AUTO: 209 10*3/MM3 (ref 140–450)
PMV BLD AUTO: 9.4 FL (ref 6–12)
POTASSIUM SERPL-SCNC: 3.6 MMOL/L (ref 3.5–5.2)
PROT SERPL-MCNC: 5.6 G/DL (ref 6–8.5)
QT INTERVAL: 439 MS
QTC INTERVAL: 495 MS
RBC # BLD AUTO: 3.68 10*6/MM3 (ref 4.14–5.8)
SODIUM SERPL-SCNC: 137 MMOL/L (ref 136–145)
WBC NRBC COR # BLD AUTO: 5.59 10*3/MM3 (ref 3.4–10.8)

## 2025-07-03 PROCEDURE — 99222 1ST HOSP IP/OBS MODERATE 55: CPT

## 2025-07-03 PROCEDURE — 80076 HEPATIC FUNCTION PANEL: CPT | Performed by: HOSPITALIST

## 2025-07-03 PROCEDURE — 83690 ASSAY OF LIPASE: CPT | Performed by: HOSPITALIST

## 2025-07-03 PROCEDURE — 93005 ELECTROCARDIOGRAM TRACING: CPT | Performed by: HOSPITALIST

## 2025-07-03 PROCEDURE — 80048 BASIC METABOLIC PNL TOTAL CA: CPT | Performed by: HOSPITALIST

## 2025-07-03 PROCEDURE — 93010 ELECTROCARDIOGRAM REPORT: CPT | Performed by: INTERNAL MEDICINE

## 2025-07-03 PROCEDURE — 85025 COMPLETE CBC W/AUTO DIFF WBC: CPT | Performed by: HOSPITALIST

## 2025-07-03 PROCEDURE — 36415 COLL VENOUS BLD VENIPUNCTURE: CPT | Performed by: HOSPITALIST

## 2025-07-03 PROCEDURE — 83735 ASSAY OF MAGNESIUM: CPT

## 2025-07-03 PROCEDURE — 25010000002 THIAMINE PER 100 MG: Performed by: HOSPITALIST

## 2025-07-03 PROCEDURE — 84100 ASSAY OF PHOSPHORUS: CPT | Performed by: HOSPITALIST

## 2025-07-03 RX ORDER — POTASSIUM CHLORIDE 1.5 G/1.58G
40 POWDER, FOR SOLUTION ORAL EVERY 4 HOURS
Status: COMPLETED | OUTPATIENT
Start: 2025-07-03 | End: 2025-07-03

## 2025-07-03 RX ADMIN — THIAMINE HYDROCHLORIDE 200 MG: 100 INJECTION, SOLUTION INTRAMUSCULAR; INTRAVENOUS at 14:48

## 2025-07-03 RX ADMIN — POTASSIUM CHLORIDE 40 MEQ: 1.5 POWDER, FOR SOLUTION ORAL at 14:48

## 2025-07-03 RX ADMIN — FOLIC ACID 1 MG: 1 TABLET ORAL at 08:13

## 2025-07-03 RX ADMIN — POTASSIUM CHLORIDE 40 MEQ: 1.5 POWDER, FOR SOLUTION ORAL at 10:17

## 2025-07-03 RX ADMIN — ASPIRIN 81 MG CHEWABLE TABLET 81 MG: 81 TABLET CHEWABLE at 08:13

## 2025-07-03 RX ADMIN — THIAMINE HYDROCHLORIDE 200 MG: 100 INJECTION, SOLUTION INTRAMUSCULAR; INTRAVENOUS at 04:50

## 2025-07-03 RX ADMIN — ROSUVASTATIN CALCIUM 40 MG: 20 TABLET, FILM COATED ORAL at 08:14

## 2025-07-03 RX ADMIN — Medication 10 ML: at 08:14

## 2025-07-03 RX ADMIN — Medication 1000 MCG: at 08:13

## 2025-07-03 RX ADMIN — PANTOPRAZOLE SODIUM 40 MG: 40 TABLET, DELAYED RELEASE ORAL at 04:50

## 2025-07-03 RX ADMIN — POTASSIUM CHLORIDE 40 MEQ: 1.5 POWDER, FOR SOLUTION ORAL at 01:58

## 2025-07-03 RX ADMIN — CLOPIDOGREL BISULFATE 75 MG: 75 TABLET, FILM COATED ORAL at 08:14

## 2025-07-03 NOTE — PLAN OF CARE
Problem: Adult Inpatient Plan of Care  Goal: Plan of Care Review  Outcome: Progressing  Flowsheets (Taken 7/3/2025 0429)  Progress: improving  Goal: Patient-Specific Goal (Individualized)  Outcome: Progressing  Goal: Absence of Hospital-Acquired Illness or Injury  Outcome: Progressing  Intervention: Identify and Manage Fall Risk  Recent Flowsheet Documentation  Taken 7/3/2025 0400 by Jack Montaño RN  Safety Promotion/Fall Prevention:   activity supervised   assistive device/personal items within reach   clutter free environment maintained   fall prevention program maintained   nonskid shoes/slippers when out of bed  Taken 7/3/2025 0200 by Jack Montaño RN  Safety Promotion/Fall Prevention:   activity supervised   assistive device/personal items within reach   clutter free environment maintained   fall prevention program maintained   nonskid shoes/slippers when out of bed  Taken 7/3/2025 0000 by Jack Montaño RN  Safety Promotion/Fall Prevention:   activity supervised   assistive device/personal items within reach   clutter free environment maintained   fall prevention program maintained   nonskid shoes/slippers when out of bed  Taken 7/2/2025 2200 by Jack Montaño RN  Safety Promotion/Fall Prevention:   activity supervised   assistive device/personal items within reach   clutter free environment maintained   fall prevention program maintained   nonskid shoes/slippers when out of bed  Taken 7/2/2025 2100 by Jack Montaño RN  Safety Promotion/Fall Prevention:   activity supervised   assistive device/personal items within reach   clutter free environment maintained   fall prevention program maintained   lighting adjusted   nonskid shoes/slippers when out of bed   room organization consistent   safety round/check completed  Intervention: Prevent Skin Injury  Recent Flowsheet Documentation  Taken 7/3/2025 0400 by Jack Montaño RN  Body Position: position changed independently  Taken 7/3/2025 0200 by  Jack Montaño RN  Body Position: position changed independently  Taken 7/3/2025 0000 by Jack Montaño RN  Body Position: position changed independently  Taken 7/2/2025 2200 by Jack Montaño RN  Body Position: position changed independently  Taken 7/2/2025 2100 by Jack Montaño RN  Body Position: position changed independently  Intervention: Prevent Infection  Recent Flowsheet Documentation  Taken 7/3/2025 0400 by Jack Montaño RN  Infection Prevention:   hand hygiene promoted   rest/sleep promoted   single patient room provided  Taken 7/3/2025 0200 by Jack Montaño RN  Infection Prevention:   hand hygiene promoted   rest/sleep promoted   single patient room provided  Taken 7/3/2025 0000 by Jack Montaño RN  Infection Prevention:   hand hygiene promoted   rest/sleep promoted   single patient room provided  Taken 7/2/2025 2200 by Jack Montaño RN  Infection Prevention:   hand hygiene promoted   rest/sleep promoted   single patient room provided  Taken 7/2/2025 2100 by Jack Montaño RN  Infection Prevention:   hand hygiene promoted   rest/sleep promoted  Goal: Optimal Comfort and Wellbeing  Outcome: Progressing  Intervention: Provide Person-Centered Care  Recent Flowsheet Documentation  Taken 7/3/2025 0400 by Jack Montaño RN  Trust Relationship/Rapport:   care explained   choices provided  Taken 7/2/2025 2100 by Jack Montaño RN  Trust Relationship/Rapport:   care explained   choices provided  Goal: Readiness for Transition of Care  Outcome: Progressing     Problem: Fall Injury Risk  Goal: Absence of Fall and Fall-Related Injury  Outcome: Progressing  Intervention: Identify and Manage Contributors  Recent Flowsheet Documentation  Taken 7/2/2025 2100 by Jack Montaño RN  Medication Review/Management: medications reviewed  Intervention: Promote Injury-Free Environment  Recent Flowsheet Documentation  Taken 7/3/2025 0400 by Jack Montaño RN  Safety Promotion/Fall Prevention:   activity  supervised   assistive device/personal items within reach   clutter free environment maintained   fall prevention program maintained   nonskid shoes/slippers when out of bed  Taken 7/3/2025 0200 by Jack Montaño RN  Safety Promotion/Fall Prevention:   activity supervised   assistive device/personal items within reach   clutter free environment maintained   fall prevention program maintained   nonskid shoes/slippers when out of bed  Taken 7/3/2025 0000 by Jack Montaño RN  Safety Promotion/Fall Prevention:   activity supervised   assistive device/personal items within reach   clutter free environment maintained   fall prevention program maintained   nonskid shoes/slippers when out of bed  Taken 7/2/2025 2200 by Jack Montaño RN  Safety Promotion/Fall Prevention:   activity supervised   assistive device/personal items within reach   clutter free environment maintained   fall prevention program maintained   nonskid shoes/slippers when out of bed  Taken 7/2/2025 2100 by Jack Montaño RN  Safety Promotion/Fall Prevention:   activity supervised   assistive device/personal items within reach   clutter free environment maintained   fall prevention program maintained   lighting adjusted   nonskid shoes/slippers when out of bed   room organization consistent   safety round/check completed   Goal Outcome Evaluation:           Progress: improving

## 2025-07-03 NOTE — CONSULTS
Date of Consultation: 25    Referral Provider: Neptali Souza MD     Reason for Consultation: Orthostatic hypotension     Encounter Provider: ZOEY Yoon    Group of Service: [facility]     Patient Name: Evan Yoon    :1950    Chief complaint: Falls, abdominal pain     History of Present Illness:  Evan Yoon is a 75 year old who is followed by [facility]. He has known GERD, hypertension, sleep apnea, Penaloza esophagus, carotid artery disease, subclavian artery stenosis, dyslipidemia, and history of stroke.     He presented to the ED on 25 with complaints of fall and difficulty walking. His family had been out of town and noticed her had a head injury when they came back. He also reports rectal bleeding a few weeks ago but that has since stopped.     On admission he was noted to have a left posterior 11th rib fracture, hypokalemia, and hyponatremia.     He underwent EGD which showed short segment Penaloza's and hyperplastic gastric polyps.     He was noted to have positive orthostatic vitals which cardiology has been consulted to evaluate.     Today he was sitting on the side of the bed and ambulating in the room on my exam. He denies symptoms with position changes such as dizziness or lightheadedness. He denies chest pain, he denies shortness of breath.     FE 10/4/23    Left ventricular systolic function is normal. Estimated left ventricular EF = 60%    Saline test results are negative.    There is calcification of the aortic valve.     Transesophageal echocardiogram shows no evidence of left atrial or left atrial appendage thrombus.  No evidence of right-to-left shunt.  Normal left ventricular systolic function.  Mild MR.    Past Medical History:   Diagnosis Date    Penaloza esophagus     Ex-smoker     GERD (gastroesophageal reflux disease)     Hypertension     Sleep apnea     uses CPAP         Past Surgical History:   Procedure Laterality Date    BACK  SURGERY      CAROTID ENDARTERECTOMY Right     COLONOSCOPY N/A 3/7/2017    Procedure: COLONOSCOPY TO CECUM AND TERMINAL ILEUM WITH COLD BIOPSY POLYPECTOMIES;  Surgeon: Dago KEMP MD;  Location:  [facility];  Service:     COLONOSCOPY N/A 7/22/2021    Procedure: COLONOSCOPY TO CECUM/TI;  Surgeon: Dago Abbott MD;  Location:  [facility];  Service: Gastroenterology;  Laterality: N/A;  pre: HX OF POLYPS  post: HEMORRHOIDS, DIVERTICULOSIS, OTHERWISE NORMAL TO TI    ENDOSCOPY N/A 3/7/2017    Procedure: ESOPHAGOGASTRODUODENOSCOPY WITH BIOPSIES;  Surgeon: Dago KEMP MD;  Location:  [facility];  Service:     ENDOSCOPY N/A 4/23/2019    Procedure: ESOPHAGOGASTRODUODENOSCOPY with biopsies;  Surgeon: Dago Abbott MD;  [facility];  Service: Gastroenterology    ENDOSCOPY N/A 7/22/2021    Procedure: ESOPHAGOGASTRODUODENOSCOPY WITH BX'S;  Surgeon: Dago Abbott MD;  Location: [facility] ENDOSCOPY;  Service: Gastroenterology;  Laterality: N/A;  pre: GERD, ALVARADO'S ESOPHAGUS  post: ALVARADO'S ESOPHAGUS, ESOPHAGITIS, HIATAL HERNIA,GASTRITIS    ENDOSCOPY N/A 7/2/2025    Procedure: ESOPHAGOGASTRODUODENOSCOPY;  Surgeon: Juice Wilkes MD;  Location: [facility] ENDOSCOPY;  Service: Gastroenterology;  Laterality: N/A;  PRE OP - EARLY SATIETY, WT LOSS, ANOREXIA  POST OP - GASTRIC POLYPS, ALVARADO;S    KNEE ARTHROSCOPY Left          No Known Allergies      No current facility-administered medications on file prior to encounter.     Current Outpatient Medications on File Prior to Encounter   Medication Sig Dispense Refill    amLODIPine (NORVASC) 5 MG tablet Take 1 tablet by mouth Daily.      aspirin 81 MG chewable tablet Chew 1 tablet Daily.      B Complex Vitamins (VITAMIN B COMPLEX PO) Take 1 tablet by mouth Daily.      cholecalciferol (VITAMIN D3) 1000 UNITS tablet Take 1 tablet by mouth Daily.      clopidogrel (PLAVIX) 75 MG tablet Take 1 tablet by mouth Daily.       "lansoprazole (PREVACID) 30 MG capsule Take 1 capsule by mouth Daily.      moexipril (UNIVASC) 15 MG tablet Take 2 tablets by mouth Daily.      rosuvastatin (CRESTOR) 40 MG tablet Take 1 tablet by mouth Daily.      Ventolin  (90 Base) MCG/ACT inhaler Inhale 2 puffs Every 4 (Four) Hours As Needed.      Multiple Vitamin (MULTI VITAMIN DAILY PO) Take 1 tablet by mouth Daily.      Omega-3 Fatty Acids (fish oil) 1000 MG capsule capsule Take 1 capsule by mouth Daily. (Patient not taking: Reported on 2025)      tadalafil (CIALIS) 5 MG tablet Take 1 tablet by mouth Daily.      vitamin E 400 UNIT capsule Take 1 capsule by mouth Daily. (Patient not taking: Reported on 2025)           Social History     Socioeconomic History    Marital status:    Tobacco Use    Smoking status: Former     Current packs/day: 0.00     Types: Cigarettes     Quit date:      Years since quittin.5    Smokeless tobacco: Never   Vaping Use    Vaping status: Never Used   Substance and Sexual Activity    Alcohol use: Yes     Alcohol/week: 3.0 standard drinks of alcohol     Types: 3 Cans of beer per week     Comment: social     Drug use: Never    Sexual activity: Defer         Family History   Problem Relation Age of Onset    Colon cancer Maternal Aunt        REVIEW OF SYSTEMS:   12 point ROS was performed and is negative except as outlined in HPI       Objective:     Vitals:    25 0805 25 0807 25 0809 25 1249   BP: 147/81 90/68 107/73 113/73   BP Location: Left arm Left arm Left arm Left arm   Patient Position: Lying Standing Standing Lying   Pulse: 76 104 109 74   Resp:    16   Temp:    98.2 °F (36.8 °C)   TempSrc:    Oral   SpO2:    99%   Weight:       Height:         Body mass index is 30.11 kg/m².  Flowsheet Rows      Flowsheet Row First Filed Value   Admission Height 182.9 cm (72\") Documented at 2025 1013   Admission Weight 101 kg (222 lb) Documented at 2025 1013              Physical " Exam  Vitals reviewed.   Constitutional:       General: He is not in acute distress.  HENT:      Head: Normocephalic.      Nose: Nose normal.   Eyes:      Extraocular Movements: Extraocular movements intact.      Pupils: Pupils are equal, round, and reactive to light.   Cardiovascular:      Rate and Rhythm: Normal rate and regular rhythm.      Pulses: Normal pulses.      Heart sounds: Normal heart sounds. Heart sounds not distant. No murmur heard.     No friction rub. No gallop. No S3 or S4 sounds.   Pulmonary:      Effort: Pulmonary effort is normal.      Breath sounds: Normal breath sounds.   Abdominal:      General: Abdomen is flat. Bowel sounds are normal.      Palpations: Abdomen is soft.      Tenderness: There is no abdominal tenderness.   Skin:     General: Skin is warm and dry.   Neurological:      General: No focal deficit present.      Mental Status: He is alert and oriented to person, place, and time. Mental status is at baseline.   Psychiatric:         Mood and Affect: Mood normal.         Behavior: Behavior normal.         Lab Review:                Results from last 7 days   Lab Units 07/03/25  0701   SODIUM mmol/L 137   POTASSIUM mmol/L 3.6   CHLORIDE mmol/L 103   CO2 mmol/L 24.8   BUN mg/dL 3.0*   CREATININE mg/dL 0.74*   GLUCOSE mg/dL 101*   CALCIUM mg/dL 8.3*     Results from last 7 days   Lab Units 06/30/25  1348 06/30/25  1124   HSTROP T ng/L 25* 23*     Results from last 7 days   Lab Units 07/03/25  0702   WBC 10*3/mm3 5.59   HEMOGLOBIN g/dL 13.6   HEMATOCRIT % 38.2   PLATELETS 10*3/mm3 209             Results from last 7 days   Lab Units 07/03/25  0701   MAGNESIUM mg/dL 1.9           EKG:            Assessment/Plan:   Orthostasis   Was felt to be hypovolemic on admission  Has had falls and reported dizziness, CT head and MRI brain with no acute findings.   Has left posterior 11th rib fracture  Orthostatic vitals positive, on exam today he denies any symptoms with position changes.   We  discussed proper oral hydration, compression stockings, and changing positions slowly.   Also discussed consideration of an echocardiogram as an outpatient with his primary cardiologist   Hypertension   Agree with stopping his amlodipine   Peripheral vascular disease   Follows with vascular surgery as an outpatient and plans to follow up once discharged.   Hypokalemia, hyponatremia, hypophosphatemia   Nephrology is following. These have all improved.  History of stroke   Continue aspirin, clopidogrel, and rosuvastatin   Alcohol use disorder  Reported per family   Likely contributing to falls and electrolyte imbalance     He is anxious to get home. No objection to discharge from a cardiac standpoint as he is asymptomatic with position changes and ambulation. Recommend he follow up with his primary cardiologist in 1-2 weeks.     Jenna Santos, APRN   07/03/25

## 2025-07-03 NOTE — NURSING NOTE
AAOX4. RA. Discharging home with family. Provided education and instructions on follow ups. MD Souza spoke with family via phone to answer questions. Patient has an established PCP and cardiologist he is going to follow up with.

## 2025-07-03 NOTE — TELEPHONE ENCOUNTER
----- Message from Juice Wilkes sent at 7/2/2025  9:52 AM EDT -----  This patient has anorexia, nausea, early satiety and weight loss.    Please schedule him for a outpatient gastric emptying study in the next couple weeks, followed a week later by outpatient follow-up in the GI clinic.

## 2025-07-03 NOTE — OUTREACH NOTE
Prep Survey      Flowsheet Row Responses   [facility] facility patient discharged from? [place]   Is LACE score < 7 ? No   Eligibility Readm Mgmt   Discharge diagnosis *AtaxiaPrimary Diagnosis Early satiety   Does the patient have one of the following disease processes/diagnoses(primary or secondary)? Other   Does the patient have Home health ordered? No   Is there a DME ordered? No   Prep survey completed? Yes            BARRY DYSON - Registered Nurse

## 2025-07-03 NOTE — DISCHARGE SUMMARY
Date of Discharge:  7/4/2025    PCP: John Fischer MD    Discharge Diagnosis:   Active Hospital Problems    Diagnosis  POA    **Ataxia [R27.0]  Yes    Left rib fracture [S22.32XA]  Unknown    History of CVA (cerebrovascular accident) [Z86.73]  Not Applicable    PVD (peripheral vascular disease) [I73.9]  Unknown    Diarrhea [R19.7]  Unknown    Hypopotassemia [E87.6]  Unknown    Hyponatremia [E87.1]  Unknown    Prolonged QT interval [R94.31]  Unknown    Alcohol use disorder [F10.90]  Unknown    Early satiety [R68.81]  Unknown    Gastroesophageal reflux disease [K21.9]  Yes    Stenosis of right subclavian artery [I77.1]  Yes    Benign essential hypertension [I10]  Yes      Resolved Hospital Problems   No resolved problems to display.     Procedure(s):  ESOPHAGOGASTRODUODENOSCOPY  07/02 0924 Upper GI Endoscopy  Consults       Date and Time Order Name Status Description    7/3/2025  8:11 AM Inpatient Cardiology Consult Completed     6/30/2025  1:47 PM Inpatient Gastroenterology Consult Completed     6/30/2025  1:47 PM Inpatient Nephrology Consult Completed           Hospital Course  Patient is a 75 y.o. male brought in by family because of falls and dizziness. Family reported concerns for excessive alcohol use (UDS and alcohol level negative). CT scan showed no stroke or hemorrhage. MRI of the brain showed nothing acute. Cortisol and TSH were okay. He was orthostatic which was probably causing his falls. He was given fluids and orthostatics improved and dizziness resolved. He also reported some abdominal pain nausea vomiting and diarrhea. He has also had weight loss and LFT elevation. He had elevated lipase however abdominal pain had resolved. GI saw and performed an EGD that showed short segment Penaloza's, hyperplastic gastric polyps. They are planning outpatient gastric emptying study. Patient's had mild elevated AST in the past. He also had electrolyte abnormalities including hyponatremia (with low urine sodium,  hypovolemic), hypophosphatemia, and hypokalemia. Hypokalemia cause prolonged QT. Nephrology was consulted. Electrolytes were corrected with fluids and replacement. QT interval improved. He also has a history of PVD and hypertension. He had severe calcification seen on CT scans, 3.4 cm infrarenal abdominal aortic aneurysm, and 2.4 cm right common iliac artery aneurysm. Because of differences in arm blood pressures ultrasound was done and he had subclavian artery stenosis on the right. He is asymptomatic and recommended that he follow-up with his vascular surgeon Dr. Ortiz. B12 was low normal and he was started on replacement.    EGD results:        I discussed the patient's findings and my recommendations with patient, family, and nursing staff (spoke with wife on the phone).    Temp:  [98.2 °F (36.8 °C)] 98.2 °F (36.8 °C)  Heart Rate:  [] 74  Resp:  [16] 16  BP: ()/(68-81) 113/73  Body mass index is 30.11 kg/m².    Physical Exam  Constitutional:       General: He is not in acute distress.     Appearance: He is not toxic-appearing.   HENT:      Head: Normocephalic and atraumatic.   Cardiovascular:      Rate and Rhythm: Normal rate and regular rhythm.   Pulmonary:      Effort: Pulmonary effort is normal. No respiratory distress.      Breath sounds: Normal breath sounds. No wheezing or rhonchi.   Abdominal:      General: Bowel sounds are normal.      Palpations: Abdomen is soft.      Tenderness: There is no abdominal tenderness. There is no guarding or rebound.   Musculoskeletal:         General: No swelling.   Skin:     General: Skin is warm and dry.   Neurological:      Mental Status: He is alert.   Psychiatric:         Mood and Affect: Mood normal.         Behavior: Behavior normal.     Disposition: Home or Self Care       Discharge Medications        New Medications        Instructions Start Date   vitamin B-12 1000 MCG tablet  Commonly known as: CYANOCOBALAMIN   1,000 mcg, Oral, Daily              Continue These Medications        Instructions Start Date   aspirin 81 MG chewable tablet   81 mg, Daily      cholecalciferol 25 MCG (1000 UT) tablet  Commonly known as: VITAMIN D3   1,000 Units, Daily      clopidogrel 75 MG tablet  Commonly known as: PLAVIX   75 mg, Daily      lansoprazole 30 MG capsule  Commonly known as: PREVACID   30 mg, Daily      multivitamin tablet tablet  Commonly known as: THERAGRAN   1 tablet, Daily      rosuvastatin 40 MG tablet  Commonly known as: CRESTOR   40 mg, Daily      tadalafil 5 MG tablet  Commonly known as: CIALIS   5 mg, Oral, Daily      Ventolin  (90 Base) MCG/ACT inhaler  Generic drug: albuterol sulfate HFA   2 puffs, Every 4 Hours PRN      VITAMIN B COMPLEX PO   1 tablet, Daily             Stop These Medications      amLODIPine 5 MG tablet  Commonly known as: NORVASC     fish oil 1000 MG capsule capsule     moexipril 15 MG tablet  Commonly known as: UNIVASC     vitamin E 400 UNIT capsule                Follow-up Information       Seamus Bowden MD. Schedule an appointment as soon as possible for a visit in 1 week(s).    Specialty: Cardiology  Why: Call for an appointment to be seen in 1-2 weeks for orthostatic hypotension.  Contact information:  [address]  [address]  [facility] [id number] [phone]               John Fischer MD. Schedule an appointment as soon as possible for a visit in 1 week(s).    Specialty: Family Medicine  Why: Follow up after hospitalization  Contact information:  [address] [place] [address] [phone]               Yung Ortiz MD. Schedule an appointment as soon as possible for a visit.    Specialty: Vascular Surgery  Contact information:  [id number]  [facility] [id number] [phone]                          No future appointments.     Neptali Souza MD  [facility]  07/04/25 06:13 EDT    Discharge time spent greater than 30 minutes.

## 2025-07-03 NOTE — PROGRESS NOTES
Name: Evan Yoon ADMIT: 2025   : 1950  PCP: John Fischer MD    MRN: [id number] LOS: 3 days   AGE/SEX: 75 y.o. male  ROOM: [facility]     Subjective   Subjective   No new complaints. No chest pain or shortness of breath or abdominal pain. No arm pain.     Objective   Objective   Vital Signs  Temp:  [97.3 °F (36.3 °C)-98.8 °F (37.1 °C)] 98.1 °F (36.7 °C)  Heart Rate:  [59-94] 77  Resp:  [14-18] 16  BP: ()/() 133/88  SpO2:  [97 %-100 %] 99 %  on   ;   Device (Oxygen Therapy): room air  Body mass index is 30.11 kg/m².    Physical Exam  Constitutional:       General: He is not in acute distress.     Appearance: He is not toxic-appearing.   HENT:      Head: Normocephalic and atraumatic.   Cardiovascular:      Rate and Rhythm: Normal rate and regular rhythm.   Pulmonary:      Effort: Pulmonary effort is normal. No respiratory distress.      Breath sounds: Normal breath sounds. No wheezing or rhonchi.   Abdominal:      General: Bowel sounds are normal.      Palpations: Abdomen is soft.      Tenderness: There is no abdominal tenderness. There is no guarding or rebound.   Musculoskeletal:         General: No swelling.   Skin:     General: Skin is warm and dry.   Neurological:      Mental Status: He is alert.   Psychiatric:         Mood and Affect: Mood normal.         Behavior: Behavior normal.     Results Review  I reviewed the patient's new clinical results.  Results from last 7 days   Lab Units 25  0702 25  0158 25  0557 25  1055   WBC 10*3/mm3 5.59 4.67 4.66 8.42   HEMOGLOBIN g/dL 13.6 13.5 13.5 15.1   PLATELETS 10*3/mm3 209 172 165 223     Results from last 7 days   Lab Units 25  1518 25  0158 25  1147 25  0557 25  2344   SODIUM mmol/L  --  135* 133* 133* 132*   POTASSIUM mmol/L 3.1* 2.9*  2.9* 3.0* 2.4* 2.1*   CHLORIDE mmol/L  --  100 93* 92* 90*   CO2 mmol/L  --  26.3 29.3* 25.0 26.0   BUN mg/dL  --  5.0* 6.0* 6.0* 7.0*   CREATININE  "mg/dL  --  0.79 0.80 0.73* 0.81   GLUCOSE mg/dL  --  99 97 95 84     Lab Results   Component Value Date    ANIONGAP 8.7 07/02/2025     Estimated Creatinine Clearance: 97.9 mL/min (by C-G formula based on SCr of 0.79 mg/dL).   Lab Results   Component Value Date    EGFR 92.1 07/02/2025     Results from last 7 days   Lab Units 07/02/25  0158 07/01/25  0557 06/30/25  1055   ALBUMIN g/dL 3.2* 3.4* 3.7   BILIRUBIN mg/dL 1.2 1.9* 2.3*   ALK PHOS U/L 57 55 64   AST (SGOT) U/L 44* 45* 54*   ALT (SGPT) U/L 18 16 22     Results from last 7 days   Lab Units 07/02/25  1915 07/02/25  0158 07/01/25  1722 07/01/25  1147 07/01/25  0557 06/30/25  2344 06/30/25  1348 06/30/25  1124 06/30/25  1055 06/30/25  1055   CALCIUM mg/dL  --  8.2*  --  8.6 8.4* 8.3* 8.7  --   --  8.7   ALBUMIN g/dL  --  3.2*  --   --  3.4*  --   --   --   --  3.7   MAGNESIUM mg/dL  --  2.0  --   --  2.2  --  2.3 2.3  --   --    PHOSPHORUS mg/dL 2.2* 2.2* 1.9*  --  1.8*  --  2.2*  --    < >  --     < > = values in this interval not displayed.       No results found for: \"HGBA1C\", \"POCGLU\"    MRI Brain With & Without Contrast  Result Date: 7/2/2025  IMPRESSION : Mild volume loss and nonspecific white matter change, not exceptional for age. No evidence of acute abnormality.  This report was finalized on 7/2/2025 1:17 AM by Dr. Ephraim Benitez M.D on Workstation: [id number]        Scheduled Meds  aspirin, 81 mg, Oral, Daily  clopidogrel, 75 mg, Oral, Daily  folic acid, 1 mg, Oral, Daily  [Held by provider] lisinopril, 10 mg, Oral, Q24H  pantoprazole, 40 mg, Oral, Q AM  rosuvastatin, 40 mg, Oral, Daily  sodium chloride, 10 mL, Intravenous, Q12H  sodium chloride, 10 mL, Intravenous, Q12H  thiamine (B-1) IV, 200 mg, Intravenous, Q8H   Followed by  [START ON 7/5/2025] thiamine, 100 mg, Oral, Daily  vitamin B-12, 1,000 mcg, Oral, Daily    Continuous Infusions     PRN Meds    acetaminophen    albuterol    senna-docusate sodium **AND** polyethylene glycol **AND** " bisacodyl **AND** bisacodyl    Calcium Replacement - Follow Nurse / BPA Driven Protocol    LORazepam **OR** LORazepam **OR** LORazepam **OR** LORazepam **OR** LORazepam **OR** LORazepam    Magnesium Standard Dose Replacement - Follow Nurse / BPA Driven Protocol    ondansetron    Phosphorus Replacement - Follow Nurse / BPA Driven Protocol    Potassium Replacement - Follow Nurse / BPA Driven Protocol    [COMPLETED] Insert Peripheral IV **AND** sodium chloride    sodium chloride    sodium chloride       Diet  Diet: Regular/House; Fluid Consistency: Thin (IDDSI 0)     Date/Time CIWA-Ar Total    07/02/25 2100 0     07/02/25 0800 0     07/02/25 0600 0     07/02/25 0400 0     07/02/25 0200 0     07/01/25 2330 0     07/01/25 2035 0     07/01/25 1600 0     07/01/25 1300 0     07/01/25 0800 2               Assessment/Plan     Active Hospital Problems    Diagnosis  POA    **Ataxia [R27.0]  Yes    Left rib fracture [S22.32XA]  Unknown    History of CVA (cerebrovascular accident) [Z86.73]  Not Applicable    PVD (peripheral vascular disease) [I73.9]  Unknown    Diarrhea [R19.7]  Unknown    Hypopotassemia [E87.6]  Unknown    Hyponatremia [E87.1]  Unknown    Prolonged QT interval [R94.31]  Unknown    Alcohol use disorder [F10.90]  Unknown    Early satiety [R68.81]  Unknown    Gastroesophageal reflux disease [K21.9]  Yes    Stenosis of right subclavian artery [I77.1]  Yes    Benign essential hypertension [I10]  Yes      Resolved Hospital Problems   No resolved problems to display.     Patient is a 75 y.o. male     Falls dizziness due to orthostasis  Left posterior 11th rib fracture  CT head no hemorrhage or acute infarct or contusion  MRI brain nothing acute  checking orthostatics daily    History of stroke  Continue aspirin and Plavix. Statin-LFTs not too high     Abdominal pain  Nausea vomiting diarrhea  History of polyps  Weight loss  LFT elevation  Reported rectal bleeding recently (has stopped and Hgb OK)  stool PCR ordered but  no BM  Lipase elevated possible pancreatitis causing symptoms however he has had no abdominal pain  Appreciate GI- EGD Short segment Penaloza's, hyperplastic gastric polyps. GES planned outpatient  Is mild elevated AST in the past  Continue PPI     Hypokalemia, prolonged QT (new from low K)  Hyponatremia  Hypophosphatemia  Appreciate nephrology- hyponatremia is from hypovolemia. Urine sodium was low. TSH and cortisol okay.   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 BP meds on hold due to orthostasis  He sees a vascular surgeon  Self- recommend follow-up     Reported alcohol use disorder per family  Could be contributing to hyponatremia and falls  MCV is also elevated. B12 low normal  If he is having maybe a mild pancreatitis could be from alcohol  UDS and alcohol level negative  CIWAs have been almost all 0    DVT prophylaxis  SCDs    Discharge  Home maybe today depending on electrolytes and orthostatics  Expected Discharge Date: 7/3/2025; Expected Discharge Time:     Discussed with patient and nursing staff    Neptali Souza MD  [facility]  07/03/25 07:52 EDT

## 2025-07-03 NOTE — CASE MANAGEMENT/SOCIAL WORK
Discharge Planning Assessment  [facility]     Patient Name: Evan Yoon  MRN: [id number]  Today's Date: 7/3/2025    Admit Date: 6/30/2025    Plan: Plans discharge home with assist of family.   Discharge Needs Assessment       Row Name 07/03/25 1308       Living Environment    People in Home spouse    Name(s) of People in Home Marcelina Yoon/spouse [phone]    Current Living Arrangements home    Potentially Unsafe Housing Conditions none    Primary Care Provided by self    Family Caregiver if Needed spouse;child(maggy), adult    Family Caregiver Names Everett Yoon/son [phone]  Marcelina Yoon/spouse [phone]    Quality of Family Relationships helpful;involved;supportive    Able to Return to Prior Arrangements yes       Resource/Environmental Concerns    Resource/Environmental Concerns none    Transportation Concerns none       Transportation Needs    In the past 12 months, has lack of transportation kept you from medical appointments or from getting medications? no    In the past 12 months, has lack of transportation kept you from meetings, work, or from getting things needed for daily living? No       Transition Planning    Patient/Family Anticipates Transition to home with family    Patient/Family Anticipated Services at Transition none    Transportation Anticipated family or friend will provide       Discharge Needs Assessment    Readmission Within the Last 30 Days no previous admission in last 30 days    Equipment Currently Used at Home cpap    Anticipated Changes Related to Illness none    Equipment Needed After Discharge none    Provided Post Acute Provider List? N/A    Provided Post Acute Provider Quality & Resource List? N/A    Patient's Choice of Community Agency(s) No skilled needs noted.                   Discharge Plan       Row Name 07/03/25 1310       Plan    Plan Plans discharge home with assist of family.    Patient/Family in Agreement with Plan yes    Plan Comments Spoke with patient and family  at bedside to complete iitial assessment. Confirmed information on facesheet. PCP: John Fischer MD and Pharmacy: [facility]). Patient lives with spouse in a single level house with basement/no DIANNA through basement entrance. Denies difficulty affording medications or transportation concerns. Patient is IADLs and mobility. Denies using HH/SNF in the past. Plans discharge home with assist of family. No needs noted. Family to transport....[facility]                  Continued Care and Services - Admitted Since 6/30/2025    No active coordination exists.       Expected Discharge Date and Time       Expected Discharge Date Expected Discharge Time    Jul 3, 2025            Demographic Summary    No documentation.                  Functional Status    No documentation.                  Psychosocial    No documentation.                  Abuse/Neglect    No documentation.                  Legal    No documentation.                  Substance Abuse    No documentation.                  Patient Forms    No documentation.                     Yen Lam RN

## 2025-07-03 NOTE — PROGRESS NOTES
[facility] Progress Note      Patient Name: Evan Yoon  : 1950  MRN: [id number]  Primary Care Physician:  John Fischer MD  Date of admission: 2025    Subjective     Interval History:   Follow-up on hyponatremia    Had a positive orthostatic BP yesterday, received 1 L IV NS  Feeling well today  No chest pain or SOA  Appetite better    Review of Systems:   As noted above    Objective     Vitals:   Temp:  [98.1 °F (36.7 °C)-98.8 °F (37.1 °C)] 98.2 °F (36.8 °C)  Heart Rate:  [] 74  Resp:  [16] 16  BP: ()/(68-88) 113/73    Intake/Output Summary (Last 24 hours) at 7/3/2025 1546  Last data filed at 7/3/2025 1440  Gross per 24 hour   Intake 500 ml   Output --   Net 500 ml       Physical Exam:    Constitutional: Awake, chronically ill, no acute distress.  HEENT: Sclera anicteric, no conjunctival injection  Neck: Supple,  no JVD  Respiratory: Clear to auscultation bilaterally, nonlabored respiration  Cardiovascular: RRR, no murmurs, no rub, no carotid bruit  Gastrointestinal: BS +, abdomen is soft, nontender and nondistended  : No palpable bladder  Musculoskeletal: No edema, no clubbing or cyanosis  Psychiatric: Appropriate affect, cooperative  Neurologic: Oriented x3, moving all extremities, normal speech and mental status  Skin: Warm and dry           Scheduled Meds:     aspirin, 81 mg, Oral, Daily  clopidogrel, 75 mg, Oral, Daily  folic acid, 1 mg, Oral, Daily  [Held by provider] lisinopril, 10 mg, Oral, Q24H  pantoprazole, 40 mg, Oral, Q AM  rosuvastatin, 40 mg, Oral, Daily  sodium chloride, 10 mL, Intravenous, Q12H  sodium chloride, 10 mL, Intravenous, Q12H  thiamine (B-1) IV, 200 mg, Intravenous, Q8H   Followed by  [START ON 2025] thiamine, 100 mg, Oral, Daily  vitamin B-12, 1,000 mcg, Oral, Daily      IV Meds:          Results Reviewed:   I have personally reviewed the results from the time of this admission to 7/3/2025 15:46 EDT     Results from last  7 days   Lab Units 07/03/25  0701 07/02/25  1518 07/02/25  0158 07/01/25  1147 07/01/25  0557   SODIUM mmol/L 137  --  135* 133* 133*   POTASSIUM mmol/L 3.6 3.1* 2.9*  2.9* 3.0* 2.4*   CHLORIDE mmol/L 103  --  100 93* 92*   CO2 mmol/L 24.8  --  26.3 29.3* 25.0   BUN mg/dL 3.0*  --  5.0* 6.0* 6.0*   CREATININE mg/dL 0.74*  --  0.79 0.80 0.73*   CALCIUM mg/dL 8.3*  --  8.2* 8.6 8.4*   BILIRUBIN mg/dL 0.8  --  1.2  --  1.9*   ALK PHOS U/L 58  --  57  --  55   ALT (SGPT) U/L 15  --  18  --  16   AST (SGOT) U/L 34  --  44*  --  45*   GLUCOSE mg/dL 101*  --  99 97 95       Estimated Creatinine Clearance: 104.5 mL/min (A) (by C-G formula based on SCr of 0.74 mg/dL (L)).    Results from last 7 days   Lab Units 07/03/25  0701 07/02/25  1915 07/02/25  0158 07/01/25  1722 07/01/25  0557   MAGNESIUM mg/dL 1.9  --  2.0  --  2.2   PHOSPHORUS mg/dL 2.9 2.2* 2.2*   < > 1.8*    < > = values in this interval not displayed.             Results from last 7 days   Lab Units 07/03/25  0702 07/02/25  0158 07/01/25  0557 06/30/25  1055   WBC 10*3/mm3 5.59 4.67 4.66 8.42   HEMOGLOBIN g/dL 13.6 13.5 13.5 15.1   PLATELETS 10*3/mm3 209 172 165 223             Assessment / Plan     ASSESSMENT:  Acute on chronic hyponatremia, in association with hypovolemia, decrease solute intake  (Uosm 499  Hui <20), and hypotension.  Improved to 137.  Renal function stable at baseline  Hypokalemia, magnesium fine.  In association with GI losses and poor oral intake, on replacement therapy  Hypophosphatemia, in association with poor oral intake, on replacement protocol  Mechanical falls/L rib fracture, managed by primary team  Ataxia/dizziness, CT head negative.  Brain MRI no acute findings  HTN, BP soft with positive orthostatics, received 1 L IV NS bolus yesterday  GERD/recent nausea and diarrhea, s/p EGD 7/2, followed by GI  Transminitis, GI following  Hx of stroke  Alcohol use disorder  Significant weight loss, over 40 pounds in the past 2  years    PLAN:  Continues to have positive orthostatic blood pressure this morning, although improved, no dizziness or lightheadedness.  No objection to be discharged from renal standpoint  Surveillance labs    Thank you for involving us in the care of Evan Yoon.  Please feel free to call with any questions.    Jesus Morgan MD  07/03/25  15:46 EDT    [facility]  [phone]    Please note that portions of this note were completed with a voice recognition program.

## 2025-07-04 NOTE — CASE MANAGEMENT/SOCIAL WORK
Case Management Discharge Note      Final Note: Home with family transport.    Provided Post Acute Provider List?: N/A  Provided Post Acute Provider Quality & Resource List?: N/A    Selected Continued Care - Discharged on 7/3/2025 Admission date: 6/30/2025 - Discharge disposition: Home or Self Care      Destination    No services have been selected for the patient.                Durable Medical Equipment    No services have been selected for the patient.                Dialysis/Infusion    No services have been selected for the patient.                Home Medical Care    No services have been selected for the patient.                Therapy    No services have been selected for the patient.                Community Resources    No services have been selected for the patient.                Community & DME    No services have been selected for the patient.                    Transportation Services  Transportation: Private Transportation  Private: Car    Final Discharge Disposition Code: 01 - home or self-care

## 2025-07-08 ENCOUNTER — READMISSION MANAGEMENT (OUTPATIENT)
Dept: CALL CENTER | Facility: HOSPITAL | Age: 75
End: 2025-07-08
Payer: MEDICARE

## 2025-07-08 NOTE — OUTREACH NOTE
Medical Week 1 Survey      Flowsheet Row Responses   [facility] patient discharged from? [place]   Does the patient have one of the following disease processes/diagnoses(primary or secondary)? Other   Week 1 attempt successful? Yes   Call start time 1405   Call end time 1409   Discharge diagnosis Ataxia  [EGD]   Is patient permission given to speak with other caregiver? Yes   Person spoke with today (if not patient) and relationship spouse, Marcelina Ramos reviewed with patient/caregiver? Yes   Does the patient have all medications ordered at discharge? Yes   Is the patient taking all medications as directed (includes completed medication regime)? Yes   Comments regarding appointments Wife reports that they are getting all the needed specialty follow up appts in place.   Does the patient have a primary care provider?  Yes   Does the patient have an appointment with their PCP within 7 days of discharge? Greater than 7 days   Comments regarding PCP PCP appt this friday 7/11   Nursing Interventions Verified appointment date/time/provider   Has the patient kept scheduled appointments due by today? N/A   Has home health visited the patient within 72 hours of discharge? N/A   Psychosocial issues? No   Did the patient receive a copy of their discharge instructions? Yes   Nursing interventions Reviewed instructions with patient   What is the patient's perception of their health status since discharge? Improving   Is the patient/caregiver able to teach back the hierarchy of who to call/visit for symptoms/problems? PCP, Specialist, Home health nurse, Urgent Care, ED, 911 Yes   If the patient is a current smoker, are they able to teach back resources for cessation? Not a smoker   Week 1 call completed? Yes   Would this patient benefit from a Referral to Amb Social Work? No   Is the patient interested in additional calls from an ambulatory ? No   Call end time 1409            JC HARRY - Registered Nurse

## 2025-07-17 ENCOUNTER — READMISSION MANAGEMENT (OUTPATIENT)
Dept: CALL CENTER | Facility: HOSPITAL | Age: 75
End: 2025-07-17
Payer: MEDICARE

## 2025-07-17 NOTE — OUTREACH NOTE
Medical Week 2 Survey      Flowsheet Row Responses   [facility] patient discharged from? [place]   Does the patient have one of the following disease processes/diagnoses(primary or secondary)? Other   Week 2 attempt successful? Yes   Call start time 1531   Discharge diagnosis Ataxia   Call end time 1532   Is patient permission given to speak with other caregiver? Yes   List who call center can speak with Everett mae   Person spoke with today (if not patient) and relationship Everett mae   Meds reviewed with patient/caregiver? Yes   Is the patient taking all medications as directed (includes completed medication regime)? Yes   Does the patient have a primary care provider?  Yes   Has the patient kept scheduled appointments due by today? Yes  [PCP fu since hosp dc]   What is the patient's perception of their health status since discharge? Improving   Is the patient/caregiver able to teach back signs and symptoms related to disease process for when to call PCP? Yes   Is the patient/caregiver able to teach back signs and symptoms related to disease process for when to call 911? Yes   Week 2 Call Completed? Yes   Graduated Yes   Did the patient feel the follow up calls were helpful during their recovery period? Yes   Graduated/Revoked comments Per son, pt is improving   Call end time 1532            Rut BRUSH - Registered Nurse

## (undated) DEVICE — SENSR O2 OXIMAX FNGR A/ 18IN NONSTR

## (undated) DEVICE — CANN NASL CO2 TRULINK W/O2 A/

## (undated) DEVICE — TUBING, SUCTION, 1/4" X 10', STRAIGHT: Brand: MEDLINE

## (undated) DEVICE — KT ORCA ORCAPOD DISP STRL

## (undated) DEVICE — CANNULA,ADULT,SOFT-TOUCH,7TUBE,SC: Brand: MEDLINE

## (undated) DEVICE — CANN O2 ETCO2 FITS ALL CONN CO2 SMPL A/ 7IN DISP LF

## (undated) DEVICE — LN SMPL CO2 SHTRM SD STREAM W/M LUER

## (undated) DEVICE — Device: Brand: DEFENDO AIR/WATER/SUCTION AND BIOPSY VALVE

## (undated) DEVICE — BITEBLOCK OMNI BLOC

## (undated) DEVICE — FRCP BX RADJAW4 NDL 2.8 240CM LG OG BX40

## (undated) DEVICE — THE TORRENT IRRIGATION SCOPE CONNECTOR IS USED WITH THE TORRENT IRRIGATION TUBING TO PROVIDE IRRIGATION FLUIDS SUCH AS STERILE WATER DURING GASTROINTESTINAL ENDOSCOPIC PROCEDURES WHEN USED IN CONJUNCTION WITH AN IRRIGATION PUMP (OR ELECTROSURGICAL UNIT).: Brand: TORRENT

## (undated) DEVICE — ADAPT CLN BIOGUARD AIR/H2O DISP

## (undated) DEVICE — BLCK/BITE BLOX W/DENTL/RIM W/STRAP 54F